# Patient Record
Sex: MALE | Race: WHITE | NOT HISPANIC OR LATINO | Employment: UNEMPLOYED | URBAN - METROPOLITAN AREA
[De-identification: names, ages, dates, MRNs, and addresses within clinical notes are randomized per-mention and may not be internally consistent; named-entity substitution may affect disease eponyms.]

---

## 2023-03-16 LAB
BASOPHILS (10*3/UL) IN BLOOD BY AUTOMATED COUNT: 0.05 X10E9/L (ref 0–0.1)
BASOPHILS/100 LEUKOCYTES IN BLOOD BY AUTOMATED COUNT: 0.5 % (ref 0–2)
EOSINOPHILS (10*3/UL) IN BLOOD BY AUTOMATED COUNT: 0.22 X10E9/L (ref 0–0.7)
EOSINOPHILS/100 LEUKOCYTES IN BLOOD BY AUTOMATED COUNT: 2.1 % (ref 0–6)
ERYTHROCYTE DISTRIBUTION WIDTH (RATIO) BY AUTOMATED COUNT: 12.6 % (ref 11.5–14.5)
ERYTHROCYTE MEAN CORPUSCULAR HEMOGLOBIN CONCENTRATION (G/DL) BY AUTOMATED: 31 G/DL (ref 32–36)
ERYTHROCYTE MEAN CORPUSCULAR VOLUME (FL) BY AUTOMATED COUNT: 93 FL (ref 80–100)
ERYTHROCYTES (10*6/UL) IN BLOOD BY AUTOMATED COUNT: 4.22 X10E12/L (ref 4.5–5.9)
HEMATOCRIT (%) IN BLOOD BY AUTOMATED COUNT: 39.4 % (ref 41–52)
HEMOGLOBIN (G/DL) IN BLOOD: 12.2 G/DL (ref 13.5–17.5)
IMMATURE GRANULOCYTES/100 LEUKOCYTES IN BLOOD BY AUTOMATED COUNT: 0.3 % (ref 0–0.9)
LEUKOCYTES (10*3/UL) IN BLOOD BY AUTOMATED COUNT: 10.4 X10E9/L (ref 4.4–11.3)
LYMPHOCYTES (10*3/UL) IN BLOOD BY AUTOMATED COUNT: 3.04 X10E9/L (ref 1.2–4.8)
LYMPHOCYTES/100 LEUKOCYTES IN BLOOD BY AUTOMATED COUNT: 29.2 % (ref 13–44)
MONOCYTES (10*3/UL) IN BLOOD BY AUTOMATED COUNT: 0.53 X10E9/L (ref 0.1–1)
MONOCYTES/100 LEUKOCYTES IN BLOOD BY AUTOMATED COUNT: 5.1 % (ref 2–10)
NEUTROPHILS (10*3/UL) IN BLOOD BY AUTOMATED COUNT: 6.55 X10E9/L (ref 1.2–7.7)
NEUTROPHILS/100 LEUKOCYTES IN BLOOD BY AUTOMATED COUNT: 62.8 % (ref 40–80)
NRBC (PER 100 WBCS) BY AUTOMATED COUNT: 0 /100 WBC (ref 0–0)
PLATELETS (10*3/UL) IN BLOOD AUTOMATED COUNT: 232 X10E9/L (ref 150–450)

## 2023-05-01 LAB
BASOPHILS (10*3/UL) IN BLOOD BY AUTOMATED COUNT: 0.04 X10E9/L (ref 0–0.1)
BASOPHILS/100 LEUKOCYTES IN BLOOD BY AUTOMATED COUNT: 0.4 % (ref 0–2)
EOSINOPHILS (10*3/UL) IN BLOOD BY AUTOMATED COUNT: 0.26 X10E9/L (ref 0–0.7)
EOSINOPHILS/100 LEUKOCYTES IN BLOOD BY AUTOMATED COUNT: 2.6 % (ref 0–6)
ERYTHROCYTE DISTRIBUTION WIDTH (RATIO) BY AUTOMATED COUNT: 12.6 % (ref 11.5–14.5)
ERYTHROCYTE MEAN CORPUSCULAR HEMOGLOBIN CONCENTRATION (G/DL) BY AUTOMATED: 31.7 G/DL (ref 32–36)
ERYTHROCYTE MEAN CORPUSCULAR VOLUME (FL) BY AUTOMATED COUNT: 92 FL (ref 80–100)
ERYTHROCYTES (10*6/UL) IN BLOOD BY AUTOMATED COUNT: 4.61 X10E12/L (ref 4.5–5.9)
HEMATOCRIT (%) IN BLOOD BY AUTOMATED COUNT: 42.3 % (ref 41–52)
HEMOGLOBIN (G/DL) IN BLOOD: 13.4 G/DL (ref 13.5–17.5)
IMMATURE GRANULOCYTES/100 LEUKOCYTES IN BLOOD BY AUTOMATED COUNT: 0.3 % (ref 0–0.9)
LEUKOCYTES (10*3/UL) IN BLOOD BY AUTOMATED COUNT: 10 X10E9/L (ref 4.4–11.3)
LYMPHOCYTES (10*3/UL) IN BLOOD BY AUTOMATED COUNT: 2.77 X10E9/L (ref 1.2–4.8)
LYMPHOCYTES/100 LEUKOCYTES IN BLOOD BY AUTOMATED COUNT: 27.8 % (ref 13–44)
MONOCYTES (10*3/UL) IN BLOOD BY AUTOMATED COUNT: 0.57 X10E9/L (ref 0.1–1)
MONOCYTES/100 LEUKOCYTES IN BLOOD BY AUTOMATED COUNT: 5.7 % (ref 2–10)
NEUTROPHILS (10*3/UL) IN BLOOD BY AUTOMATED COUNT: 6.28 X10E9/L (ref 1.2–7.7)
NEUTROPHILS/100 LEUKOCYTES IN BLOOD BY AUTOMATED COUNT: 63.2 % (ref 40–80)
NRBC (PER 100 WBCS) BY AUTOMATED COUNT: 0 /100 WBC (ref 0–0)
PLATELETS (10*3/UL) IN BLOOD AUTOMATED COUNT: 246 X10E9/L (ref 150–450)

## 2023-05-26 LAB
BASOPHILS (10*3/UL) IN BLOOD BY AUTOMATED COUNT: 0.06 X10E9/L (ref 0–0.1)
BASOPHILS/100 LEUKOCYTES IN BLOOD BY AUTOMATED COUNT: 0.6 % (ref 0–2)
EOSINOPHILS (10*3/UL) IN BLOOD BY AUTOMATED COUNT: 0.21 X10E9/L (ref 0–0.7)
EOSINOPHILS/100 LEUKOCYTES IN BLOOD BY AUTOMATED COUNT: 2.1 % (ref 0–6)
ERYTHROCYTE DISTRIBUTION WIDTH (RATIO) BY AUTOMATED COUNT: 12.9 % (ref 11.5–14.5)
ERYTHROCYTE MEAN CORPUSCULAR HEMOGLOBIN CONCENTRATION (G/DL) BY AUTOMATED: 31.5 G/DL (ref 32–36)
ERYTHROCYTE MEAN CORPUSCULAR VOLUME (FL) BY AUTOMATED COUNT: 91 FL (ref 80–100)
ERYTHROCYTES (10*6/UL) IN BLOOD BY AUTOMATED COUNT: 4.28 X10E12/L (ref 4.5–5.9)
HEMATOCRIT (%) IN BLOOD BY AUTOMATED COUNT: 39 % (ref 41–52)
HEMOGLOBIN (G/DL) IN BLOOD: 12.3 G/DL (ref 13.5–17.5)
IMMATURE GRANULOCYTES/100 LEUKOCYTES IN BLOOD BY AUTOMATED COUNT: 0.2 % (ref 0–0.9)
LEUKOCYTES (10*3/UL) IN BLOOD BY AUTOMATED COUNT: 10.2 X10E9/L (ref 4.4–11.3)
LYMPHOCYTES (10*3/UL) IN BLOOD BY AUTOMATED COUNT: 2.46 X10E9/L (ref 1.2–4.8)
LYMPHOCYTES/100 LEUKOCYTES IN BLOOD BY AUTOMATED COUNT: 24 % (ref 13–44)
MONOCYTES (10*3/UL) IN BLOOD BY AUTOMATED COUNT: 0.49 X10E9/L (ref 0.1–1)
MONOCYTES/100 LEUKOCYTES IN BLOOD BY AUTOMATED COUNT: 4.8 % (ref 2–10)
NEUTROPHILS (10*3/UL) IN BLOOD BY AUTOMATED COUNT: 6.99 X10E9/L (ref 1.2–7.7)
NEUTROPHILS/100 LEUKOCYTES IN BLOOD BY AUTOMATED COUNT: 68.3 % (ref 40–80)
NRBC (PER 100 WBCS) BY AUTOMATED COUNT: 0 /100 WBC (ref 0–0)
PLATELETS (10*3/UL) IN BLOOD AUTOMATED COUNT: 296 X10E9/L (ref 150–450)

## 2023-06-26 LAB
BASOPHILS (10*3/UL) IN BLOOD BY AUTOMATED COUNT: 0.04 X10E9/L (ref 0–0.1)
BASOPHILS/100 LEUKOCYTES IN BLOOD BY AUTOMATED COUNT: 0.3 % (ref 0–2)
EOSINOPHILS (10*3/UL) IN BLOOD BY AUTOMATED COUNT: 0.21 X10E9/L (ref 0–0.7)
EOSINOPHILS/100 LEUKOCYTES IN BLOOD BY AUTOMATED COUNT: 1.8 % (ref 0–6)
ERYTHROCYTE DISTRIBUTION WIDTH (RATIO) BY AUTOMATED COUNT: 13.1 % (ref 11.5–14.5)
ERYTHROCYTE MEAN CORPUSCULAR HEMOGLOBIN CONCENTRATION (G/DL) BY AUTOMATED: 31 G/DL (ref 32–36)
ERYTHROCYTE MEAN CORPUSCULAR VOLUME (FL) BY AUTOMATED COUNT: 92 FL (ref 80–100)
ERYTHROCYTES (10*6/UL) IN BLOOD BY AUTOMATED COUNT: 4.57 X10E12/L (ref 4.5–5.9)
HEMATOCRIT (%) IN BLOOD BY AUTOMATED COUNT: 42.2 % (ref 41–52)
HEMOGLOBIN (G/DL) IN BLOOD: 13.1 G/DL (ref 13.5–17.5)
IMMATURE GRANULOCYTES/100 LEUKOCYTES IN BLOOD BY AUTOMATED COUNT: 0.3 % (ref 0–0.9)
LEUKOCYTES (10*3/UL) IN BLOOD BY AUTOMATED COUNT: 11.7 X10E9/L (ref 4.4–11.3)
LYMPHOCYTES (10*3/UL) IN BLOOD BY AUTOMATED COUNT: 2.72 X10E9/L (ref 1.2–4.8)
LYMPHOCYTES/100 LEUKOCYTES IN BLOOD BY AUTOMATED COUNT: 23.2 % (ref 13–44)
MONOCYTES (10*3/UL) IN BLOOD BY AUTOMATED COUNT: 0.62 X10E9/L (ref 0.1–1)
MONOCYTES/100 LEUKOCYTES IN BLOOD BY AUTOMATED COUNT: 5.3 % (ref 2–10)
NEUTROPHILS (10*3/UL) IN BLOOD BY AUTOMATED COUNT: 8.12 X10E9/L (ref 1.2–7.7)
NEUTROPHILS/100 LEUKOCYTES IN BLOOD BY AUTOMATED COUNT: 69.1 % (ref 40–80)
NRBC (PER 100 WBCS) BY AUTOMATED COUNT: 0 /100 WBC (ref 0–0)
PLATELETS (10*3/UL) IN BLOOD AUTOMATED COUNT: 241 X10E9/L (ref 150–450)

## 2023-07-27 LAB
BASOPHILS (10*3/UL) IN BLOOD BY AUTOMATED COUNT: 0.05 X10E9/L (ref 0–0.1)
BASOPHILS/100 LEUKOCYTES IN BLOOD BY AUTOMATED COUNT: 0.5 % (ref 0–2)
EOSINOPHILS (10*3/UL) IN BLOOD BY AUTOMATED COUNT: 0.18 X10E9/L (ref 0–0.7)
EOSINOPHILS/100 LEUKOCYTES IN BLOOD BY AUTOMATED COUNT: 1.9 % (ref 0–6)
ERYTHROCYTE DISTRIBUTION WIDTH (RATIO) BY AUTOMATED COUNT: 13.4 % (ref 11.5–14.5)
ERYTHROCYTE MEAN CORPUSCULAR HEMOGLOBIN CONCENTRATION (G/DL) BY AUTOMATED: 30.4 G/DL (ref 32–36)
ERYTHROCYTE MEAN CORPUSCULAR VOLUME (FL) BY AUTOMATED COUNT: 95 FL (ref 80–100)
ERYTHROCYTES (10*6/UL) IN BLOOD BY AUTOMATED COUNT: 4.61 X10E12/L (ref 4.5–5.9)
HEMATOCRIT (%) IN BLOOD BY AUTOMATED COUNT: 43.8 % (ref 41–52)
HEMOGLOBIN (G/DL) IN BLOOD: 13.3 G/DL (ref 13.5–17.5)
IMMATURE GRANULOCYTES/100 LEUKOCYTES IN BLOOD BY AUTOMATED COUNT: 0.3 % (ref 0–0.9)
LEUKOCYTES (10*3/UL) IN BLOOD BY AUTOMATED COUNT: 9.6 X10E9/L (ref 4.4–11.3)
LYMPHOCYTES (10*3/UL) IN BLOOD BY AUTOMATED COUNT: 2.01 X10E9/L (ref 1.2–4.8)
LYMPHOCYTES/100 LEUKOCYTES IN BLOOD BY AUTOMATED COUNT: 21 % (ref 13–44)
MONOCYTES (10*3/UL) IN BLOOD BY AUTOMATED COUNT: 0.46 X10E9/L (ref 0.1–1)
MONOCYTES/100 LEUKOCYTES IN BLOOD BY AUTOMATED COUNT: 4.8 % (ref 2–10)
NEUTROPHILS (10*3/UL) IN BLOOD BY AUTOMATED COUNT: 6.83 X10E9/L (ref 1.2–7.7)
NEUTROPHILS/100 LEUKOCYTES IN BLOOD BY AUTOMATED COUNT: 71.5 % (ref 40–80)
NRBC (PER 100 WBCS) BY AUTOMATED COUNT: 0 /100 WBC (ref 0–0)
PLATELETS (10*3/UL) IN BLOOD AUTOMATED COUNT: 229 X10E9/L (ref 150–450)

## 2023-08-29 PROBLEM — R41.842 VISUOSPATIAL DEFICIT: Status: ACTIVE | Noted: 2023-08-29

## 2023-08-29 PROBLEM — F09 COGNITIVE DYSFUNCTION, ACQUIRED: Status: ACTIVE | Noted: 2023-08-29

## 2023-08-29 PROBLEM — R41.843 PSYCHOMOTOR DEFICIT: Status: ACTIVE | Noted: 2023-08-29

## 2023-08-29 PROBLEM — J34.2 DEVIATED NASAL SEPTUM: Status: ACTIVE | Noted: 2021-05-06

## 2023-08-29 PROBLEM — E78.5 BORDERLINE HYPERLIPIDEMIA: Status: ACTIVE | Noted: 2023-08-29

## 2023-08-29 PROBLEM — R41.840 ATTENTION OR CONCENTRATION DEFICIT: Status: ACTIVE | Noted: 2023-08-29

## 2023-08-29 PROBLEM — R41.89 COGNITIVE IMPAIRMENT: Status: ACTIVE | Noted: 2023-08-29

## 2023-08-29 PROBLEM — F31.31 BIPOLAR AFFECTIVE DISORDER, CURRENTLY DEPRESSED, MILD (MULTI): Status: ACTIVE | Noted: 2023-08-29

## 2023-08-29 PROBLEM — R00.0 TACHYCARDIA WITH HEART RATE 100-120 BEATS PER MINUTE: Status: ACTIVE | Noted: 2023-08-29

## 2023-08-29 PROBLEM — G40.802 FRONTAL LOBE EPILEPSY (MULTI): Status: ACTIVE | Noted: 2023-08-29

## 2023-08-29 PROBLEM — M76.61 RIGHT ACHILLES TENDINITIS: Status: ACTIVE | Noted: 2023-08-29

## 2023-08-29 PROBLEM — I10 BENIGN ESSENTIAL HYPERTENSION: Status: ACTIVE | Noted: 2023-08-29

## 2023-08-29 PROBLEM — E55.9 VITAMIN D DEFICIENCY: Status: ACTIVE | Noted: 2023-08-29

## 2023-08-29 PROBLEM — S06.9X5A: Status: ACTIVE | Noted: 2023-08-29

## 2023-08-29 PROBLEM — F31.9 BIPOLAR AFFECTIVE DISORDER (MULTI): Status: ACTIVE | Noted: 2023-08-29

## 2023-08-29 PROBLEM — F90.0 ADHD, PREDOMINANTLY INATTENTIVE TYPE: Status: ACTIVE | Noted: 2023-08-29

## 2023-08-29 PROBLEM — R41.3 MEMORY LOSS: Status: ACTIVE | Noted: 2023-08-29

## 2023-08-29 PROBLEM — R56.9 SEIZURE (MULTI): Status: ACTIVE | Noted: 2023-08-29

## 2023-08-29 PROBLEM — F31.11: Status: ACTIVE | Noted: 2023-08-29

## 2023-08-29 PROBLEM — M25.871 ANKLE IMPINGEMENT SYNDROME, RIGHT: Status: ACTIVE | Noted: 2023-08-29

## 2023-08-29 PROBLEM — F41.9 ANXIETY: Status: ACTIVE | Noted: 2023-08-29

## 2023-08-29 PROBLEM — G40.309 GENERALIZED EPILEPSY (MULTI): Status: ACTIVE | Noted: 2023-08-29

## 2023-08-29 PROBLEM — R41.844 FRONTAL LOBE AND EXECUTIVE FUNCTION DEFICIT: Status: ACTIVE | Noted: 2023-08-29

## 2023-08-29 RX ORDER — HYDROCHLOROTHIAZIDE 25 MG/1
1 TABLET ORAL DAILY
COMMUNITY

## 2023-08-29 RX ORDER — BUSPIRONE HYDROCHLORIDE 5 MG/1
5 TABLET ORAL
COMMUNITY
End: 2023-10-05 | Stop reason: SDUPTHER

## 2023-08-29 RX ORDER — LITHIUM CARBONATE 300 MG/1
2 TABLET, FILM COATED, EXTENDED RELEASE ORAL NIGHTLY
COMMUNITY
Start: 2021-05-13 | End: 2023-10-10 | Stop reason: SDUPTHER

## 2023-08-29 RX ORDER — LITHIUM CARBONATE 450 MG/1
1 TABLET ORAL EVERY MORNING
COMMUNITY
Start: 2019-02-01 | End: 2023-10-10 | Stop reason: SDUPTHER

## 2023-08-29 RX ORDER — FOLIC ACID 1 MG/1
1 TABLET ORAL DAILY
COMMUNITY
Start: 2018-05-01 | End: 2024-01-16 | Stop reason: SDUPTHER

## 2023-08-29 RX ORDER — CLOZAPINE 25 MG/1
1 TABLET ORAL 2 TIMES DAILY
COMMUNITY
Start: 2018-09-19 | End: 2023-10-10 | Stop reason: SDUPTHER

## 2023-08-29 RX ORDER — FLUTICASONE PROPIONATE 50 MCG
2 SPRAY, SUSPENSION (ML) NASAL DAILY
COMMUNITY

## 2023-08-29 RX ORDER — GABAPENTIN 400 MG/1
1 CAPSULE ORAL 3 TIMES DAILY
COMMUNITY
Start: 2018-10-30 | End: 2023-10-10 | Stop reason: SDUPTHER

## 2023-08-29 RX ORDER — AZELASTINE 1 MG/ML
2 SPRAY, METERED NASAL 2 TIMES DAILY
COMMUNITY

## 2023-08-29 RX ORDER — MULTIVIT-MIN/IRON FUM/FOLIC AC 7.5 MG-4
TABLET ORAL
COMMUNITY
Start: 2019-02-21

## 2023-08-29 RX ORDER — LACOSAMIDE 150 MG/1
1 TABLET, FILM COATED ORAL 2 TIMES DAILY
COMMUNITY
Start: 2020-07-09 | End: 2023-11-01 | Stop reason: SDUPTHER

## 2023-08-29 RX ORDER — CLOZAPINE 200 MG/1
200 TABLET ORAL
COMMUNITY
Start: 2018-09-19 | End: 2023-10-10 | Stop reason: SDUPTHER

## 2023-08-29 RX ORDER — HYDROCODONE BITARTRATE AND ACETAMINOPHEN 5; 325 MG/1; MG/1
TABLET ORAL
COMMUNITY

## 2023-08-29 RX ORDER — LACOSAMIDE 50 MG/1
TABLET ORAL
COMMUNITY
End: 2023-11-01 | Stop reason: SDUPTHER

## 2023-09-08 LAB
BASOPHILS (10*3/UL) IN BLOOD BY AUTOMATED COUNT: 0.04 X10E9/L (ref 0–0.1)
BASOPHILS/100 LEUKOCYTES IN BLOOD BY AUTOMATED COUNT: 0.4 % (ref 0–2)
EOSINOPHILS (10*3/UL) IN BLOOD BY AUTOMATED COUNT: 0.26 X10E9/L (ref 0–0.7)
EOSINOPHILS/100 LEUKOCYTES IN BLOOD BY AUTOMATED COUNT: 2.4 % (ref 0–6)
ERYTHROCYTE DISTRIBUTION WIDTH (RATIO) BY AUTOMATED COUNT: 13.2 % (ref 11.5–14.5)
ERYTHROCYTE MEAN CORPUSCULAR HEMOGLOBIN CONCENTRATION (G/DL) BY AUTOMATED: 31 G/DL (ref 32–36)
ERYTHROCYTE MEAN CORPUSCULAR VOLUME (FL) BY AUTOMATED COUNT: 95 FL (ref 80–100)
ERYTHROCYTES (10*6/UL) IN BLOOD BY AUTOMATED COUNT: 4.46 X10E12/L (ref 4.5–5.9)
HEMATOCRIT (%) IN BLOOD BY AUTOMATED COUNT: 42.2 % (ref 41–52)
HEMOGLOBIN (G/DL) IN BLOOD: 13.1 G/DL (ref 13.5–17.5)
IMMATURE GRANULOCYTES/100 LEUKOCYTES IN BLOOD BY AUTOMATED COUNT: 1.2 % (ref 0–0.9)
LEUKOCYTES (10*3/UL) IN BLOOD BY AUTOMATED COUNT: 10.8 X10E9/L (ref 4.4–11.3)
LYMPHOCYTES (10*3/UL) IN BLOOD BY AUTOMATED COUNT: 2.88 X10E9/L (ref 1.2–4.8)
LYMPHOCYTES/100 LEUKOCYTES IN BLOOD BY AUTOMATED COUNT: 26.6 % (ref 13–44)
MONOCYTES (10*3/UL) IN BLOOD BY AUTOMATED COUNT: 0.51 X10E9/L (ref 0.1–1)
MONOCYTES/100 LEUKOCYTES IN BLOOD BY AUTOMATED COUNT: 4.7 % (ref 2–10)
NEUTROPHILS (10*3/UL) IN BLOOD BY AUTOMATED COUNT: 6.99 X10E9/L (ref 1.2–7.7)
NEUTROPHILS/100 LEUKOCYTES IN BLOOD BY AUTOMATED COUNT: 64.7 % (ref 40–80)
NRBC (PER 100 WBCS) BY AUTOMATED COUNT: 0 /100 WBC (ref 0–0)
PLATELETS (10*3/UL) IN BLOOD AUTOMATED COUNT: 226 X10E9/L (ref 150–450)

## 2023-10-04 ENCOUNTER — PROCEDURE VISIT (OUTPATIENT)
Dept: PSYCHOLOGY | Facility: HOSPITAL | Age: 33
End: 2023-10-04
Payer: MEDICARE

## 2023-10-04 DIAGNOSIS — R41.89 COGNITIVE IMPAIRMENT: Primary | ICD-10-CM

## 2023-10-04 DIAGNOSIS — R41.844 FRONTAL LOBE AND EXECUTIVE FUNCTION DEFICIT: ICD-10-CM

## 2023-10-04 DIAGNOSIS — S06.9X5S: ICD-10-CM

## 2023-10-04 PROCEDURE — 96158 HLTH BHV IVNTJ INDIV 1ST 30: CPT | Performed by: PSYCHOLOGIST

## 2023-10-04 PROCEDURE — 96159 HLTH BHV IVNTJ INDIV EA ADDL: CPT | Performed by: PSYCHOLOGIST

## 2023-10-04 NOTE — PSYCHOTHERAPY
Paige continues to do well living independently, with supports. Today we reviewed his activities and 'rules':  Dating: he is back on two dating apps but not seeing anyone, though he would like to.  4 pm rule: Paige is not sticking to the 4 pm rule very well, though this morning he started to look on social media and instead went for a walk; we talked about the reasons for this rule as helping him maintain a varied schedule and not spend too much time on line.  Exercise: Paige has run several times and swum once, walking most other days. We agreed a SMART goal of exercise 5/7 days/week, with one day each of swimming or running.  Recipes: He is not making new recipes once a week, saying it is because he shops after he cooks. We looked at this and changed the plan to shop before he cooks and to plan meals before he shops. He will shop on Friday and cook on the weekend.  Reading: He continues to do his reading assignments and news feed but no other reading. He acknowledged that he does not really want to read anything else, so we changed the goal to reflect this.  Cleaning: okay  Jobs: meeting the goal  Ukelele: has not practiced because missed lessons; plan is to resume practice 3 / week for 15 minutes each time. He is also to email me.  Work: he is working with [employer] on a sensible return to work plan and has completed the first section of a [employer] course  Paige hopes to return to driving, and we talked through the process of being cleared medically, taking an OT eval, and then taking an OT road eval.  Time: 2:45 pm - 3:45 pm  Next session: October 18, 2023

## 2023-10-04 NOTE — PSYCHOTHERAPY
Paige continues to do well with living independently. We reviewed his list of independent living rules:  4 pm rule (no social media before 4 pm): Fair. Paige started to look online this morning, but then decided to go for a walk instead, which was a good choice.  Exercise: Paige has swum once and run a bit. We tightened the plan to exercise 5/7 days/week, with one day each of swimming and running.  Recipe: Paige has not consistently prepared one new recipe a week. We identified problems with his planning in that he shops after the days when he would cook. We changed this to planning and shopping on Friday and then cooking on the weekend.  Reading: Paige is completing his reading assignments and news feed reading, but not his extra reading. He said this is not really a goal for him, so we decided to omit it from this list.  Cleaning: Satisfactory.  Jobs:  Good attendance.  Uke practice: he has not practiced because he has not had a lesson. Agreed he will practice 3 times a week for 15 minutes each.  Work in the future: He is meeting with Sharon Trevino in OT to make a sensible return to work plan.   Driving: Paige wants to drive and understands he first needs medical clearance, and then must pass OT evals and on the road tests.

## 2023-10-05 ENCOUNTER — OFFICE VISIT (OUTPATIENT)
Dept: BEHAVIORAL HEALTH | Facility: CLINIC | Age: 33
End: 2023-10-05
Payer: MEDICARE

## 2023-10-05 ENCOUNTER — TREATMENT (OUTPATIENT)
Dept: OCCUPATIONAL THERAPY | Facility: HOSPITAL | Age: 33
End: 2023-10-05
Payer: MEDICARE

## 2023-10-05 VITALS
DIASTOLIC BLOOD PRESSURE: 81 MMHG | SYSTOLIC BLOOD PRESSURE: 120 MMHG | HEART RATE: 79 BPM | HEIGHT: 69 IN | TEMPERATURE: 98.4 F | BODY MASS INDEX: 27.7 KG/M2 | WEIGHT: 187 LBS

## 2023-10-05 DIAGNOSIS — R41.89 COGNITIVE IMPAIRMENT: Primary | ICD-10-CM

## 2023-10-05 DIAGNOSIS — R41.844 FRONTAL LOBE AND EXECUTIVE FUNCTION DEFICIT: ICD-10-CM

## 2023-10-05 DIAGNOSIS — R41.840 ATTENTION OR CONCENTRATION DEFICIT: ICD-10-CM

## 2023-10-05 DIAGNOSIS — F31.70 BIPOLAR DISORDER IN PARTIAL REMISSION, MOST RECENT EPISODE UNSPECIFIED TYPE (MULTI): ICD-10-CM

## 2023-10-05 DIAGNOSIS — F41.9 ANXIETY: ICD-10-CM

## 2023-10-05 PROCEDURE — 3079F DIAST BP 80-89 MM HG: CPT | Performed by: PSYCHIATRY & NEUROLOGY

## 2023-10-05 PROCEDURE — 3074F SYST BP LT 130 MM HG: CPT | Performed by: PSYCHIATRY & NEUROLOGY

## 2023-10-05 PROCEDURE — 99215 OFFICE O/P EST HI 40 MIN: CPT | Performed by: PSYCHIATRY & NEUROLOGY

## 2023-10-05 PROCEDURE — 97537 COMMUNITY/WORK REINTEGRATION: CPT | Mod: GO | Performed by: OCCUPATIONAL THERAPIST

## 2023-10-05 RX ORDER — BUSPIRONE HYDROCHLORIDE 5 MG/1
TABLET ORAL
Qty: 90 TABLET | Refills: 2 | Status: SHIPPED | OUTPATIENT
Start: 2023-10-05 | End: 2023-10-10 | Stop reason: SDUPTHER

## 2023-10-05 ASSESSMENT — PAIN SCALES - GENERAL
PAINLEVEL_OUTOF10: 0 - NO PAIN
PAINLEVEL: 0-NO PAIN

## 2023-10-05 ASSESSMENT — PAIN - FUNCTIONAL ASSESSMENT: PAIN_FUNCTIONAL_ASSESSMENT: 0-10

## 2023-10-05 NOTE — PROGRESS NOTES
"          Occupational Therapy    Occupational Therapy Treatment    Name: Paige Ybarra  MRN: [id number]  : 1990  Date: 10/05/23  Time Calculation  Start Time: 1110    Assessment: Pt demonstrating improvement in ability to use PQRST technique for reading. He however is demonstrating difficutly with recall of home program and will need to use compensatory strategies to manage home programs from various providers. Speed of processing for management of change is slow and will need to increase in order to work in position patient is considering.      Plan:    Continue OT treatment.     Subjective : Honestly, I forgot that I had homework from you, what was I supposed to do?\"  General:  OT Last Visit  OT Received On: 10/05/23  General  Referred By: Dr. Herminia Angulo  Preferred Learning Style: verbal, visual, kinesthetic  Pain Assessment:  Pain Assessment  Pain Assessment: 0-10  Pain Score: 0 - No pain    Objective          Cognitive Skill Development:  Cognitive Skill Development  Cognitive Skill Development Activity 1: Patient forgetting homework last week and this week. Patient asked to identify a plan to avoid forgetting homework in the future. He identified plan to take notes, place a header of home practice to make homework more obvious when reviewing notes and to put reminder in his phone. He required cues to enter homework into phone at end of session.  Cognitive Skill Development Activity 2: Continued with reading retraining to prepare for work.  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 .Patient initially required mod cues for PQRST technique progressing to intermittent min cues. He will need to improve self initiation of this technique to be able to independently learn in work position.  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 He had some difficulty determining change amounts mentally. Provided instruction on counting back change. Using this method, patient was able to count back change with 83% accuracy with extended time required for processing.  Community/Work Reintegration Training Activity 2: Discussed patient's goal of working and further explored reasons for goal  to prioritize tx.  Patient identified wanting to work due to boredom, desire to help others, satisfaction with being able to earn money, ability to learn. Patient places high importance on work goal.                OP EDUCATION: Patient instructed in use of PQRST techniques and on technique for counting back change.  He demonstrated good understanding but will require additional instruction due to cognitive deficits.        Goals:  Encounter Problems       Encounter Problems (Active)       goals:       Patient will complete work exploration identifying personal goals, job options, and independently comparing work options to his goals.       Start:  10/05/23    Expected End:  01/04/24            Patient will complete work training independently with use of compensatory tools/techniques       Start:  10/05/23    Expected End:  01/04/24            Patient will complete alternative activities to work such as leisure or schooling to meet personal goals independently using compensatory tools/techniques.       Start:  10/05/23    Expected End:  01/04/24            Patient will complete work exploration, interview, application,procurement and job tasks independently using compensatory techniques       Start:  10/05/23    Expected End:   01/04/24            Patient will independently use memory, planning, problem solving, and decision making cognitive aids.,       Start:  10/05/23    Expected End:  01/04/24

## 2023-10-05 NOTE — PATIENT INSTRUCTIONS
Plan:   - Continue buspirone 5mg in the morning and increase to 10mg (two 5mg tablets) in the evening   - Continue other current medications:  Clozapine 225mg (200mg +25mg) in AM, 425mg (two 200mg + 25mg) at night  Lithium 450mg in morning, 600mg in evening  Gabapentin 400mg tid.   Folic acid 1mg daily.   - Please get additional blood tests at your next monthly blood draw. It has to be done before you take your morning medication.   - Continue monthly blood draws.   - Continue to monitor for any change in mood, tremors or any new symptoms.   - Follow up with neurologist on a regular basis for management of seizures.   - Continue psychotherapy and cognitive rehabilitation therapy.   - Regular physical exercise.   - Recommend heart-healthy, low-fat, low-cholesterol diet.   - Follow up in about 2 months.   - Call sooner ([phone]) in case of any questions or concerns.
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
## 2023-10-07 ENCOUNTER — LAB (OUTPATIENT)
Dept: LAB | Facility: LAB | Age: 33
End: 2023-10-07
Payer: MEDICARE

## 2023-10-07 DIAGNOSIS — F31.70 BIPOLAR DISORDER IN PARTIAL REMISSION, MOST RECENT EPISODE UNSPECIFIED TYPE (MULTI): ICD-10-CM

## 2023-10-07 LAB
LITHIUM SERPL-SCNC: 0.72 MMOL/L (ref 0.6–1.2)
TSH SERPL-ACNC: 1.43 MIU/L (ref 0.44–3.98)

## 2023-10-07 PROCEDURE — 80159 DRUG ASSAY CLOZAPINE: CPT

## 2023-10-07 PROCEDURE — 36415 COLL VENOUS BLD VENIPUNCTURE: CPT

## 2023-10-07 PROCEDURE — 84443 ASSAY THYROID STIM HORMONE: CPT

## 2023-10-07 PROCEDURE — 80178 ASSAY OF LITHIUM: CPT

## 2023-10-10 ENCOUNTER — LAB (OUTPATIENT)
Dept: LAB | Facility: LAB | Age: 33
End: 2023-10-10
Payer: MEDICARE

## 2023-10-10 DIAGNOSIS — F31.70 BIPOLAR AFFECTIVE DISORDER IN REMISSION (MULTI): ICD-10-CM

## 2023-10-10 DIAGNOSIS — F41.9 ANXIETY: ICD-10-CM

## 2023-10-10 LAB
BASOPHILS # BLD AUTO: 0.02 X10*3/UL (ref 0–0.1)
BASOPHILS NFR BLD AUTO: 0.2 %
EOSINOPHIL # BLD AUTO: 0.15 X10*3/UL (ref 0–0.7)
EOSINOPHIL NFR BLD AUTO: 1.7 %
ERYTHROCYTE [DISTWIDTH] IN BLOOD BY AUTOMATED COUNT: 12.8 % (ref 11.5–14.5)
HCT VFR BLD AUTO: 40.4 % (ref 41–52)
HGB BLD-MCNC: 12.8 G/DL (ref 13.5–17.5)
IMM GRANULOCYTES # BLD AUTO: 0.02 X10*3/UL (ref 0–0.7)
IMM GRANULOCYTES NFR BLD AUTO: 0.2 % (ref 0–0.9)
LYMPHOCYTES # BLD AUTO: 1.31 X10*3/UL (ref 1.2–4.8)
LYMPHOCYTES NFR BLD AUTO: 14.8 %
MCH RBC QN AUTO: 30 PG (ref 26–34)
MCHC RBC AUTO-ENTMCNC: 31.7 G/DL (ref 32–36)
MCV RBC AUTO: 95 FL (ref 80–100)
MONOCYTES # BLD AUTO: 0.56 X10*3/UL (ref 0.1–1)
MONOCYTES NFR BLD AUTO: 6.3 %
NEUTROPHILS # BLD AUTO: 6.78 X10*3/UL (ref 1.2–7.7)
NEUTROPHILS NFR BLD AUTO: 76.8 %
NRBC BLD-RTO: 0 /100 WBCS (ref 0–0)
PLATELET # BLD AUTO: 204 X10*3/UL (ref 150–450)
PMV BLD AUTO: 11.7 FL (ref 7.5–11.5)
RBC # BLD AUTO: 4.26 X10*6/UL (ref 4.5–5.9)
WBC # BLD AUTO: 8.8 X10*3/UL (ref 4.4–11.3)

## 2023-10-10 PROCEDURE — 85025 COMPLETE CBC W/AUTO DIFF WBC: CPT

## 2023-10-10 PROCEDURE — 36415 COLL VENOUS BLD VENIPUNCTURE: CPT

## 2023-10-10 RX ORDER — CLOZAPINE 25 MG/1
25 TABLET ORAL 2 TIMES DAILY
Qty: 60 TABLET | Refills: 2 | Status: SHIPPED | OUTPATIENT
Start: 2023-10-10 | End: 2024-05-20 | Stop reason: SDUPTHER

## 2023-10-10 RX ORDER — LITHIUM CARBONATE 300 MG/1
600 TABLET, FILM COATED, EXTENDED RELEASE ORAL NIGHTLY
Qty: 60 TABLET | Refills: 2 | Status: SHIPPED | OUTPATIENT
Start: 2023-10-10 | End: 2024-05-23

## 2023-10-10 RX ORDER — GABAPENTIN 400 MG/1
400 CAPSULE ORAL 3 TIMES DAILY
Qty: 90 CAPSULE | Refills: 2 | Status: SHIPPED | OUTPATIENT
Start: 2023-10-10 | End: 2024-03-26 | Stop reason: SDUPTHER

## 2023-10-10 RX ORDER — LITHIUM CARBONATE 450 MG/1
450 TABLET ORAL EVERY MORNING
Qty: 30 TABLET | Refills: 2 | Status: SHIPPED | OUTPATIENT
Start: 2023-10-10 | End: 2024-06-06 | Stop reason: SDUPTHER

## 2023-10-10 RX ORDER — BUSPIRONE HYDROCHLORIDE 5 MG/1
TABLET ORAL
Qty: 90 TABLET | Refills: 2 | Status: SHIPPED | OUTPATIENT
Start: 2023-10-10 | End: 2024-01-16 | Stop reason: SDUPTHER

## 2023-10-10 RX ORDER — CLOZAPINE 200 MG/1
TABLET ORAL
Qty: 90 TABLET | Refills: 2 | Status: SHIPPED | OUTPATIENT
Start: 2023-10-10 | End: 2024-05-20 | Stop reason: SDUPTHER

## 2023-10-10 NOTE — PROGRESS NOTES
Paige continues to do well living independently, with supports. Today we reviewed his activities and 'rules':  Dating: he is back on two dating apps but not seeing anyone, though he would like to.  4 pm rule: Paige is not sticking to the 4 pm rule very well, though this morning he started to look on social media and instead went for a walk; we talked about the reasons for this rule as helping him maintain a varied schedule and not spend too much time on line.  Exercise: Paige has run several times and swum once, walking most other days. We agreed a SMART goal of exercise 5/7 days/week, with one day each of swimming or running.  Recipes: He is not making new recipes once a week, saying it is because he shops after he cooks. We looked at this and changed the plan to shop before he cooks and to plan meals before he shops. He will shop on Friday and cook on the weekend.  Reading: He continues to do his reading assignments and news feed but no other reading. He acknowledged that he does not really want to read anything else, so we changed the goal to reflect this.  Cleaning: okay  Jobs: meeting the goal  Ukelele: has not practiced because missed lessons; plan is to resume practice 3 / week for 15 minutes each time. He is also to email me.  Work: he is working with Sharon on a sensible return to work plan and has completed the first section of a [employer] course  Paige hopes to return to driving, and we talked through the process of being cleared medically, taking an OT eval, and then taking an OT road eval.  Time: 2:45 pm - 3:45 pm  Next session: October 18, 2023

## 2023-10-11 ENCOUNTER — OFFICE VISIT (OUTPATIENT)
Dept: PSYCHOLOGY | Facility: HOSPITAL | Age: 33
End: 2023-10-11
Payer: MEDICARE

## 2023-10-11 DIAGNOSIS — R41.840 ATTENTION OR CONCENTRATION DEFICIT: ICD-10-CM

## 2023-10-11 DIAGNOSIS — Z87.820 HISTORY OF TRAUMATIC BRAIN INJURY: ICD-10-CM

## 2023-10-11 DIAGNOSIS — R41.3 MEMORY LOSS: Primary | ICD-10-CM

## 2023-10-11 DIAGNOSIS — R41.89 COGNITIVE IMPAIRMENT: ICD-10-CM

## 2023-10-11 LAB
CLOZAPINE SERPL-MCNC: 356 NG/ML
CLOZAPINE+NOR SERPL-MCNC: 566 NG/ML
CNO SERPL-MCNC: <100 NG/ML
NORCLOZAPINE SERPL-MCNC: 210 NG/ML

## 2023-10-11 PROCEDURE — 96158 HLTH BHV IVNTJ INDIV 1ST 30: CPT

## 2023-10-11 PROCEDURE — 96158 HLTH BHV IVNTJ INDIV 1ST 30: CPT | Mod: AH

## 2023-10-11 PROCEDURE — 96159 HLTH BHV IVNTJ INDIV EA ADDL: CPT

## 2023-10-11 PROCEDURE — 96159 HLTH BHV IVNTJ INDIV EA ADDL: CPT | Mod: AH

## 2023-10-11 NOTE — PROGRESS NOTES
Outpatient Physical Therapy Lymphedema Treatment Note  [facility]     Patient Name: Richa Dolan  : 1958  MRN: [id number]  Today's Date: 2021        Visit Date: 2021    Visit Dx:    ICD-10-CM ICD-9-CM   1. Malignant neoplasm of overlapping sites of left breast in female, estrogen receptor positive (CMS/HCC)  C50.812 174.8    Z17.0 V86.0   2. Status post left breast lumpectomy  Z98.890 V45.89   3. At risk for lymphedema  Z91.89 V49.89   4. Decreased range of motion of left shoulder  M25.612 719.51   5. Breast edema  N64.89 611.89   6. History of left breast cancer  Z85.3 V10.3   7. Scar tissue  L90.5 709.2   8. Axillary pain, left  M79.622 729.5   9. Shoulder impingement syndrome, left  M75.42 726.2       Patient Active Problem List   Diagnosis   • Abnormal liver function tests   • Hypertension   • Insomnia   • Knee pain   • Leukopenia   • Acute right-sided low back pain without sciatica   • Knee osteoarthritis   • Osteoporosis   • Arthralgia of multiple joints   • Seasonal allergic rhinitis   • Thrombocytopenia (CMS/HCC)   • Bone cyst   • Chronic pain of right knee   • Acquired immunocompromised state (CMS/HCC)   • Bruises easily   • Loss of hair   • Anxiety   • Elevated liver enzymes   • GERD (gastroesophageal reflux disease)   • HIV positive long-term non-progressor (CMS/HCC)   • Hypercholesterolemia   • Lupus anticoagulant positive   • Neck pain, chronic   • Chemotherapy-induced neutropenia (CMS/HCC)   • Tear of medial meniscus of right knee, current   • Chondromalacia, knee   • ICH (intracerebral hemorrhage) (CMS/HCC)   • Stomach discomfort   • Abnormal bruising   • Alopecia   • Headache   • Intractable chronic migraine without aura and without status migrainosus   • Osteoarthritis of right knee   • Need for vaccination   • Mild episode of recurrent major depressive disorder (CMS/HCC)   • Ingrown nail of great toe of right foot   • Left wrist pain   • Breast cancer screening   •  Name: Paige Ybarra   MRN: [id number]   Age: 33 y.o.   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     Session Details:   The topics of the session included completing a check-in, reviewing home practice, and discussing ways to implement cognitive tools in his daily life. During the check-in, he indicated not having a lot of success using dating apps, so he plans on starting to ask out individuals in person. He was praised for being cognitively flexible. He also indicated having low confidence and needing the approval of his mother. We discussed the possibility of setting boundaries, but he had a hard time determining appropriate boundaries to set; he is planning on talking to his therapist about it later this week. The home practice was reviewed to identify at least one tool for each cognitive problem he identified (completed) and practice Ukulele at least 3x per week (not completed). He successfully generated a number of solutions for various memory, attention, and executive functioning problems. We reviewed the solutions he generated, and we made minor adjustments/modifications to the solutions. The plan is for him to create a living toolbox for cognitive compensatory strategies. He generated numerous creative ways to create a toolbox, such as using an excel spreadsheet, creating flip book, creating poster, writing song, among others. We discussed using the goal management framework to select a solution after weighing the pros/cons of them. Paige continues to have difficulty practicing the ukulele consistently. He identified that not having consistent lessons as a barrier for him practicing. We problem solved some solutions, such as asking his ukulele teacher ways to practice between sessions, writing a note as a reminder, and tracking  Malignant neoplasm of overlapping sites of left breast in female, estrogen receptor positive (CMS/HCC)   • History of stroke   • Arthritis   • Family history of breast cancer   • Encounter for screening mammogram for breast cancer   • Epistaxis   • Chemotherapy-induced thrombocytopenia   • Anemia associated with chemotherapy   • Substance or medication-induced sleep disorder, insomnia type (CMS/HCC)   • Body aches   • Neuropathy   • History of left breast cancer   • Other fatigue   • HIV infection (CMS/HCC)   • Fat necrosis of left breast        Lymphedema     Row Name 02/23/21 1617 02/23/21 1600          Subjective Pain    Able to rate subjective pain?  yes  -SC (r) RP (t) SC (c)  yes  -SC (r) RP (t) SC (c)     Pre-Treatment Pain Level  0  -SC (r) RP (t) SC (c)  0  -SC (r) RP (t) SC (c)        Subjective Comments    Subjective Comments  Pt reported that overall she is doing well. However she has noticed that there is leaking fluid and pt has to apply gauze multiple times throughout the day.  -SC (r) RP (t) SC (c)  --  -SC (r) RP (t) SC (c)        Manual Lymphatic Drainage    Manual Lymphatic Drainage  initial sequence;opened regional lymph nodes;opened anastamoses;extremity treatment  -SC (r) RP (t) SC (c)  --     Initial Sequence  supraclavicular;shoulder collectors;abdomen;diaphragmatic breathing  -SC (r) RP (t) SC (c)  --     Supraclavicular  right;left  -SC (r) RP (t) SC (c)  --     Shoulder Collectors  right;left  -SC (r) RP (t) SC (c)  --     Abdomen  superficial  -SC (r) RP (t) SC (c)  --     Opened Regional Lymph Nodes  axillary;inguinal;ribs  -SC (r) RP (t) SC (c)  --     Axillary  right;left  -SC (r) RP (t) SC (c)  --     Inguinal  left  -SC (r) RP (t) SC (c)  --     Opened Anastamoses  anterior axillo-axillary;axillo-inguinal  -SC (r) RP (t) SC (c)  --     Axillo-Inguinal  left  -SC (r) RP (t) SC (c)  --     Extremity Treatment  simple/brief MLD;MLD to proximal limb only  -SC (r) RP (t) SC (c)  --      times he practices.     Behavioral Observations   General Appearance: Well groomed, appropriate eye contact  Attitude/Behavior: Cooperative  Motor: No psychomotor agitation or retardation, no tremor or other abnormal movements  Speech: Normal rate, volume, prosody  Gait/Station: WFL - Within functional limits  Affect: Euthymic, full-range  Thought Process: Linear, goal directed  Thought Associations: No loosening of associations  Thought Content: Normal  Perception: No perceptual abnormalities noted  Sensorium: Alert  Insight: Fair  Judgement: Fair    Diagnosis:   1. Memory loss    2. Attention or concentration deficit    3. Cognitive impairment    4. History of traumatic brain injury       Plan:   The next in-person session will be on 10/25 at 3PM. For home practice, he will complete the goal management framework for creating a toolbox and practice ukulele 3x per week.The plan for the next session is to complete a check-in, review home practice, and discuss methods to use CogSMART strategies in everyday life.       Time:   3087-0842   Simple/Brief MLD  left  -SC (r) RP (t) SC (c)  --     MLD to Proximal Limb Only  left  -SC (r) RP (t) SC (c)  --     Manual Lymphatic Drainage Comments  left breast MLD  -SC (r) RP (t) SC (c)  --     Manual Therapy  MLD left breast, scar massage lumpectomy and lat flap incision, myofascial release left pec, anterior shoulder, medial arm, axilla, lateral flank, subscapularis release, AP and inferior joint mobs L GHJ, lateral distractions, inferior oscillations, PROM all planes, gua sha/myofascial release left pec, into lateral breast and inferior breast at incision site/lymphedema of lumpectomy site. Applied steristip and education of use.   -SC (r) RP (t) SC (c)  --        Compression/Skin Care    Compression/Skin Care Comments  Pt presented to session wearing custom bra. Pt experiencing leakage in the L breast around biopsy site.   -SC (r) RP (t) SC (c)  --       User Key  (r) = Recorded By, (t) = Taken By, (c) = Cosigned By    Initials Name Provider Type    SC Yola Valenzuela, PT Physical Therapist    RP Zuly Contreras, YOLANDA Student PT Student                        PT Assessment/Plan     Row Name 02/23/21 6424          PT Assessment    Functional Limitations  Limitations in functional capacity and performance;Performance in leisure activities;Limitations in community activities;Performance in sport activities  -SC (r) RP (t) SC (c)     Impairments  Endurance;Impaired flexibility;Impaired lymphatic circulation;Impaired muscle endurance;Integumentary integrity;Joint mobility;Muscle strength;Pain;Peripheral nerve integrity;Poor body mechanics;Posture;Range of motion;Sensation  -SC (r) RP (t) SC (c)     Assessment Comments  Assessed and updated goals. Pt presents w/ persistent L shoulder stiffness, decreased ROM, and decreased skin and breast mobility. Pt responds well to manual therapy/gua sha and demonstrates improvement in shoulder ROM and skin mobility. Pt expressed concern over leakage in L breast near  breast biopsy site that she has been using guaze at home to treat; did not notice any signs of infection and applied steri strip and instructed pt to not massage directly over the steri strip for 3-5 days and allow the strip to naturally fall off. Updated pt's HEP to include RC and scapular strengthening exercises to address lingering L shoulder pain.  -SC (r) RP (t) SC (c)        PT Plan    PT Plan Comments  cont w/ wearing custom bra, swell spot, steri strip care, adjust self-massage for 3-5 days, update pt's HEP as indicated  -SC (r) RP (t) SC (c)       User Key  (r) = Recorded By, (t) = Taken By, (c) = Cosigned By    Initials Name Provider Type    Yola Rodriguez, PT Physical Therapist    Zuly Russ, PT Student PT Student             OP Exercises     Row Name 02/23/21 1617 02/23/21 1600          Subjective Comments    Subjective Comments  Pt reported that overall she is doing well. However she has noticed that there is leaking fluid and pt has to apply gauze multiple times throughout the day.  -SC (r) RP (t) SC (c)  --  -SC (r) RP (t) SC (c)        Subjective Pain    Able to rate subjective pain?  yes  -SC (r) RP (t) SC (c)  yes  -SC (r) RP (t) SC (c)     Pre-Treatment Pain Level  0  -SC (r) RP (t) SC (c)  0  -SC (r) RP (t) SC (c)        Total Minutes    84317 - PT Therapeutic Exercise Minutes  15  -SC (r) RP (t) SC (c)  --     51585 - PT Manual Therapy Minutes  12  -SC (r) RP (t) SC (c)  --        Exercise 1    Exercise Name 1  SL ER L   -SC (r) RP (t) SC (c)  --     Sets 1  2  -SC (r) RP (t) SC (c)  --     Reps 1  10  -SC (r) RP (t) SC (c)  --        Exercise 2    Exercise Name 2  PNF shld D2 TB HL  -SC (r) RP (t) SC (c)  --     Sets 2  1  -SC (r) RP (t) SC (c)  --     Reps 2  10  -SC (r) RP (t) SC (c)  --     Additional Comments  GTB  -SC (r) RP (t) SC (c)  --        Exercise 3    Exercise Name 3  shld horz abd TB HL  -SC (r) RP (t) SC (c)  --     Sets 3  2  -SC (r) RP (t) SC (c)  --     Reps  3  10  -SC (r) RP (t) SC (c)  --     Additional Comments  GTB  -SC (r) RP (t) SC (c)  --        Exercise 4    Exercise Name 4  shld rows/ext TB standing B  -SC  --     Sets 4  2  -SC  --     Reps 4  10  -SC  --     Additional Comments  GTB  -SC  --       User Key  (r) = Recorded By, (t) = Taken By, (c) = Cosigned By    Initials Name Provider Type    Yola Rodriguez, PT Physical Therapist    Zuly Russ, PT Student PT Student                     Manual Rx (last 36 hours)      Manual Treatments     Row Name 02/23/21 1617             Total Minutes    45709 - PT Manual Therapy Minutes  12  -SC (r) RP (t) SC (c)         Manual Rx 1    Manual Rx 1 Location  left shoulder supine on plinth drapped in gown HOB elevated/HL  -SC (r) RP (t) SC (c)      Manual Rx 1 Type  AP and inferior joint mobs L GHJ, PROM all planes, gentle myofascial release medial arm with retrograde massage to axilla, scar massage lat flap reconstruction, gentle left breast mobilizations  -SC (r) RP (t) SC (c)      Manual Rx 1 Grade  III-IV  -SC (r) RP (t) SC (c)         Manual Rx 2    Manual Rx 2 Location  R SL scapular mobilizations  -SC (r) RP (t) SC (c)      Manual Rx 2 Type  Depression, elevation, protraction, retraction  -SC (r) RP (t) SC (c)      Manual Rx 2 Grade  Grade I  -SC (r) RP (t) SC (c)        User Key  (r) = Recorded By, (t) = Taken By, (c) = Cosigned By    Initials Name Provider Type    Yola Rodriguez, PT Physical Therapist    Zuly Russ, PT Student PT Student          PT OP Goals     Row Name 02/23/21 1617          PT Short Term Goals    STG 1  Patient independent and compliant with initial home exercise program focused on diaphragmatic breathing, range of motion, flexibility to decrease edema and improve lymphatic flow for decreased edema and decreased risk of infection.   -SC (r) RP (t) SC (c)     STG 1 Progress  Met  -SC (r) RP (t) SC (c)     STG 2  Patient demonstrate proper awareness of What is  Lymphedema and 18 Steps of Prevention for improved prevention, management, care of symptoms and ease of transition to self-care of condition.   -SC (r) RP (t) SC (c)     STG 2 Progress  Met  -SC (r) RP (t) SC (c)     STG 3  Patient demonstrate independence and compliance with proper skin care during/post radiation for improved mobility, decreased scar tissue, axillary cording and edema.  -SC (r) RP (t) SC (c)     STG 3 Progress  Met  -SC (r) RP (t) SC (c)     STG 4  Patient independent and compliant with breast mobilization techniques for improved mobility, skin care and lymphatic flow.  -SC (r) RP (t) SC (c)     STG 4 Progress  Met  -SC (r) RP (t) SC (c)     STG 5  Patient independent and compliant with daytime OTS prevention compression garments as indicated for decreased risk of lymphedema and infection and safety with transition of self-care of condition.   -SC (r) RP (t) SC (c)     STG 5 Progress  Met  -SC (r) RP (t) SC (c)        Long Term Goals    LTG Date to Achieve  03/17/21  -SC (r) RP (t) SC (c)     LTG 1  Patient's bioimpedance score to remain between -10 and 6.5 for decreased risk of developing stage II post lumpectomy lymphedema syndrome left UE and to establish treatment for early intervention of condition if/when indicated.  -SC (r) RP (t) SC (c)     LTG 1 Progress  Progressing baseline post op 06/18/2020 -4.7  -SC (r) RP (t) SC (c)     LTG 1 Progress Comments  insurance does not cover, was slightly elevated in Dec 2020, to reassess Feb to April 2021 pending progress  -SC (r) RP (t) SC (c)     LTG 2  Patient independent and compliant with advanced Home Exercise Program and self-care techniques for self-management of condition.   -SC (r) RP (t) SC (c)     LTG 2 Progress  Progressing  -SC (r) RP (t) SC (c)     LTG 2 Progress Comments  Pt is compliant w/ current HEP and self-massage techniques  -SC (r) RP (t) SC (c)     LTG 3  Patient able to wear fitted post lumpectomy/radiation bra with padding left  (if indicated)  >/=6-8 hours for work schedule for improved compression to lateral flank/left breast.  -SC (r) RP (t) SC (c)     LTG 3 Progress  Progressing  -SC (r) RP (t) SC (c)     LTG 3 Progress Comments  Pt presented to therapy wearing custom bra. Pt reported that she wears is but does report that it gets uncomfortable by the end of the day.  -SC (r) RP (t) SC (c)     LTG 4  Patient transition to cardiovascular exercise program (walking) >/=150 to 180 mins/week with moderate intensity for improved heart health, weight loss, decreased risk of osteoporosis and improved phase angle/metabolic rate.  -SC (r) RP (t) SC (c)     LTG 4 Progress  Met  -SC (r) RP (t) SC (c)     LTG 5  Patient score </=25% on Quick DASH for improved functional use of L UE home, community, and sleep  -SC (r) RP (t) SC (c)     LTG 5 Progress  Met initial evaluation 50%  -SC (r) RP (t) SC (c)     LTG 6  Patient with negative impingement testing left shoulder for improved mobility, posture and decreased pain with ADLs and home activities  -SC (r) RP (t) SC (c)     LTG 6 Progress  Progressing  -SC (r) RP (t) SC (c)     LTG 6 Progress Comments  Pt responds well to manual therapy/gua sha techniques.  -SC (r) RP (t) SC (c)     LTG 7  Patient with minimal fibrosis left breast for decreased risk of stage II lymphedema left and progression of decrease/infections  -SC (r) RP (t) SC (c)     LTG 7 Progress  Progressing  -SC (r) RP (t) SC (c)     LTG 7 Progress Comments  Pt's tissue presented to therapy w/ improved mobility and diminished swelling. Pt did report presence of leakage that she has applied gauze into her bra, applied steristrip to region, assess next session  -SC (r) RP (t) SC (c)        Time Calculation    PT Goal Re-Cert Due Date  03/17/21  -SC (r) RP (t) SC (c)       User Key  (r) = Recorded By, (t) = Taken By, (c) = Cosigned By    Initials Name Provider Type    Yola Rodriguez, PT Physical Therapist    RP Zuly Contreras, PT  Student PT Student          Therapy Education  Education Details: cont w/ manual therapy, cont w/ custom bra fitting, applied steri strip over area w/ leakage and educated to not massage directly over that area for 3-5 days to allow for healing  Given: Edema management, Symptoms/condition management, Other (comment), Posture/body mechanics, Bandaging/dressing change  Program: New, Reinforced, Modified  How Provided: Verbal, Demonstration  Provided to: Patient  Level of Understanding: Teach back education performed, Verbalized, Demonstrated              Time Calculation:   Start Time: 1617  Stop Time: 1706  Time Calculation (min): 49 min                 Yola Broderick, PT  2/23/2021

## 2023-10-12 ENCOUNTER — TREATMENT (OUTPATIENT)
Dept: OCCUPATIONAL THERAPY | Facility: HOSPITAL | Age: 33
End: 2023-10-12
Payer: MEDICARE

## 2023-10-12 ENCOUNTER — TELEMEDICINE (OUTPATIENT)
Dept: BEHAVIORAL HEALTH | Facility: CLINIC | Age: 33
End: 2023-10-12
Payer: MEDICARE

## 2023-10-12 DIAGNOSIS — R41.844 FRONTAL LOBE AND EXECUTIVE FUNCTION DEFICIT: ICD-10-CM

## 2023-10-12 DIAGNOSIS — F31.9 BIPOLAR 1 DISORDER (MULTI): ICD-10-CM

## 2023-10-12 DIAGNOSIS — R41.89 COGNITIVE IMPAIRMENT: ICD-10-CM

## 2023-10-12 DIAGNOSIS — F90.0 ATTENTION DEFICIT HYPERACTIVITY DISORDER (ADHD), PREDOMINANTLY INATTENTIVE TYPE: ICD-10-CM

## 2023-10-12 DIAGNOSIS — R41.89 COGNITIVE IMPAIRMENT: Primary | ICD-10-CM

## 2023-10-12 PROCEDURE — 97537 COMMUNITY/WORK REINTEGRATION: CPT | Mod: GO | Performed by: OCCUPATIONAL THERAPIST

## 2023-10-12 PROCEDURE — 90834 PSYTX W PT 45 MINUTES: CPT | Performed by: PSYCHOLOGIST

## 2023-10-12 NOTE — PROGRESS NOTES
8 AM [address] Ind Psych for ADHD, bipolar I, executive/cognitive dysfunction (45 MIN, 9744-1508)  Virtual.  Supportive Therapy. Asked out para-. Discussed potential problems that might arise. Still walking/running regularly. 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 Next: 2 weeks.

## 2023-10-12 NOTE — PROGRESS NOTES
Occupational Therapy    Occupational Therapy Treatment    Name: Paige Ybarra  MRN: [id number]  : 1990  Date: 10/12/23       Assessment:     Plan:       Subjective : Patient expressing some doubts with his desire to work and feel more useful.      Objective       Community/Work Reintegration Training: Patient demonstrating confusion with his desire for work. He voiced the strongest motivator is feeling useful.  Money and learning are secondary.  Patient participated in time management wheel completion to help identify his current activity and to analyze how often he completes tasks where he feels useful.  Patient identified need for 2 different wheels, one for M and W and another for T, Th, F.   MW wheel- 9 hours sleep, 5 hours being useful- volunteering at [employer] and [employer],  2 hours of self care, 1 hour of cooking, 5 hours of misc.   T, TH, F wheel- sleep 9 hours, 7 hours being useful (Visionnaire), self care 1 hour,  4 hours misc. , 1 hour cooking  Patient to spend some time assessing his time management wheel and identify activities that may be missing from his day or where he feels he is spending too much time. Patient will bring this back to next session.        OP EDUCATION: purpose of time management wheel, home program       Goals:  Active       goals:       Patient will complete work exploration identifying personal goals, job options, and independently comparing work options to his goals. (Progressing)       Start:  10/05/23    Expected End:  24            Patient will complete work training independently with use of compensatory tools/techniques (Progressing)       Start:  10/05/23    Expected End:  24            Patient will complete alternative activities to work such as leisure or schooling to meet personal goals independently using compensatory tools/techniques. (Progressing)       Start:  10/05/23    Expected End:  24            Patient will complete work  exploration, interview, application,procurement and job tasks independently using compensatory techniques (Progressing)       Start:  10/05/23    Expected End:  01/04/24            Patient will independently use memory, planning, problem solving, and decision making cognitive aids., (Progressing)       Start:  10/05/23    Expected End:  01/04/24

## 2023-10-18 ENCOUNTER — OFFICE VISIT (OUTPATIENT)
Dept: PSYCHOLOGY | Facility: HOSPITAL | Age: 33
End: 2023-10-18
Payer: MEDICARE

## 2023-10-18 DIAGNOSIS — R41.89 COGNITIVE IMPAIRMENT: Primary | ICD-10-CM

## 2023-10-18 DIAGNOSIS — R41.844 FRONTAL LOBE AND EXECUTIVE FUNCTION DEFICIT: ICD-10-CM

## 2023-10-18 DIAGNOSIS — S06.9X5S: ICD-10-CM

## 2023-10-18 PROCEDURE — 96158 HLTH BHV IVNTJ INDIV 1ST 30: CPT | Performed by: PSYCHOLOGIST

## 2023-10-18 NOTE — PROGRESS NOTES
"Neuropsychology Note    Name: Paige Ybarra    Date of Service: October 18, 2023     Reason For Visit: Neuropsychological Rehabilitation    Summary of Visit: Paige arrived late due to his paratransit ride picking him up late. 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 Otherwise, all was well, though he continues to be frustrated with not finding someone to date. We went through our usual review, as follows:  4 pm rule: Compliant with one \"cheat\"  Exercise: Doing well, sends email with details to me each day, is now running 1.6 mile and then walking  New recipe: No new recipes because he is eating food from his freezer; will shop and cook this weekend  Reading: Compliant with reading assignments from his ; daily reading news feed  Cleaning: Doing well with kitchen; fair with bathroom  Ukelele practice: Has only done once, saying he does not have a song to practice; I asked how he could get one, and he replied on the internet, and then extrapolated from that that he needs to be more self-directed. This led to a discussion of next steps in his progress toward independence including his initiating and solving problems more on his own  Work: Compliant with current jobs; working with OT to consider changes  Driving: New-kylie goal; plan is to first get medical clearance, then an OT evaluation, and then possibly driving lessons    Overall plan is to reach full compliance with this plan without prompts and being held accountable by others, to the degree possible.    Time: 3:25 pm - 3:59 pm    Next Session: November 1, 2023    "

## 2023-10-19 ENCOUNTER — APPOINTMENT (OUTPATIENT)
Dept: BEHAVIORAL HEALTH | Facility: CLINIC | Age: 33
End: 2023-10-19
Payer: MEDICARE

## 2023-10-25 ENCOUNTER — OFFICE VISIT (OUTPATIENT)
Dept: PSYCHOLOGY | Facility: HOSPITAL | Age: 33
End: 2023-10-25
Payer: MEDICARE

## 2023-10-25 DIAGNOSIS — R41.840 ATTENTION OR CONCENTRATION DEFICIT: ICD-10-CM

## 2023-10-25 DIAGNOSIS — Z87.820 HISTORY OF TRAUMATIC BRAIN INJURY: ICD-10-CM

## 2023-10-25 DIAGNOSIS — R41.3 MEMORY LOSS: Primary | ICD-10-CM

## 2023-10-25 DIAGNOSIS — R41.89 COGNITIVE IMPAIRMENT: ICD-10-CM

## 2023-10-25 PROCEDURE — 96159 HLTH BHV IVNTJ INDIV EA ADDL: CPT | Mod: AH

## 2023-10-25 PROCEDURE — 96159 HLTH BHV IVNTJ INDIV EA ADDL: CPT

## 2023-10-25 PROCEDURE — 96158 HLTH BHV IVNTJ INDIV 1ST 30: CPT | Mod: AH

## 2023-10-25 PROCEDURE — 96158 HLTH BHV IVNTJ INDIV 1ST 30: CPT

## 2023-10-25 NOTE — PROGRESS NOTES
Name: Paige Ybarra   MRN: [id number]   Age: 33 y.o.   Date of Service: 10/25/2023    Chief Complaint:   Paige is being seen for neuropsychological rehabilitation for cognitive and emotional problems due to traumatic brain injury sustained in the context of bipolar disorder.     Session Details:   The topics of the session included completing a check-in, reviewing home practice, and completing a problem solving worksheet. During the check-in, he reported doing a kind deed recently. 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 We discussed others ways to make friends, and he plans on downloading Bumble BFF and reaching out to old friends. The home practice was reviewed to complete the goal management framework for creating a toolbox (not completed) and practice ukulele 3x per week (partially completed: 1-2x per week). He indicated that he did not complete the home practice because he forgot to look at his notebook with the home practice written in it; we discussed strategies to remember to complete home practice, such as leaving his notebook open and using Neumind. The remainder of the session focused on completing a problem solving worksheet to determine the most effective way to create a living toolbox for cognitive strategies. He generated possible solutions, such as creating Excel spreadsheet, flip book, poster, or book. We discussed things he should consider when doing pros/cons: money, time, effort, involving others, and emotional toll. We discussed pros/cons of each solution. He selected making an excel spreadsheet for his toolbox. We discussed steps to achieve the solution: (1) create an Excel spreadsheet with two sheets- cognitive problems with solutions and definition for each solution, (2) fill out spreadsheet, (3) refer to COGSMART manual, (4) schedule time in [employer] to work on spreadsheet, and (5) share with provider.    Behavioral Observations   General  Appearance: Well groomed, appropriate eye contact  Attitude/Behavior: Cooperative  Motor: No psychomotor agitation or retardation, no tremor or other abnormal movements  Speech: Normal rate, volume, prosody  Gait/Station: WFL - Within functional limits  Affect: Euthymic, full-range  Thought Process: Linear, goal directed  Thought Associations: No loosening of associations  Thought Content: Normal  Perception: No perceptual abnormalities noted  Sensorium: Alert  Insight: Fair  Judgement: Fair    Diagnosis:   1. Memory loss    2. Attention or concentration deficit    3. Cognitive impairment    4. History of traumatic brain injury      Plan:   The next in-person session will be scheduled at a later date. For home practice, he will create a cognitive toolbox, download [employer] BFF, and reach out to a friend. The plan for the next session is to complete a check-in, review home practice, and discuss methods to use CogSMART strategies in everyday life.     Time:   5135-4690

## 2023-10-26 ENCOUNTER — APPOINTMENT (OUTPATIENT)
Dept: BEHAVIORAL HEALTH | Facility: CLINIC | Age: 33
End: 2023-10-26
Payer: MEDICARE

## 2023-10-26 ENCOUNTER — TREATMENT (OUTPATIENT)
Dept: OCCUPATIONAL THERAPY | Facility: HOSPITAL | Age: 33
End: 2023-10-26
Payer: MEDICARE

## 2023-10-26 DIAGNOSIS — R41.840 ATTENTION OR CONCENTRATION DEFICIT: Primary | ICD-10-CM

## 2023-10-26 DIAGNOSIS — R41.89 COGNITIVE IMPAIRMENT: ICD-10-CM

## 2023-10-26 DIAGNOSIS — R41.844 FRONTAL LOBE AND EXECUTIVE FUNCTION DEFICIT: ICD-10-CM

## 2023-10-26 PROCEDURE — 97537 COMMUNITY/WORK REINTEGRATION: CPT | Mod: GO | Performed by: OCCUPATIONAL THERAPIST

## 2023-10-26 ASSESSMENT — PAIN SCALES - GENERAL: PAINLEVEL_OUTOF10: 0 - NO PAIN

## 2023-10-26 ASSESSMENT — PAIN - FUNCTIONAL ASSESSMENT: PAIN_FUNCTIONAL_ASSESSMENT: 0-10

## 2023-10-26 NOTE — PROGRESS NOTES
Occupational Therapy    Occupational Therapy Treatment    Name: Paige Ybarra  MRN: [id number]  : 1990  Date: 10/26/23       Assessment:  Patient is struggling with decision making and needs to do some thinking about personal goals and if he wants to pursue work or learning retraining. Encouraged patient to  share questions created today with mom who is his financial POA to help with his decision making process. He is demonstrating inconsistent performance of Home program.   He continues to express desire to be of use and desire for learning.  Patient however very concrete having difficulty seeing any options for learning beyond academics.      Plan: Will hold OT until patient makes decision about goal direction.        Subjective  Patient reports that he did not work anymore on his activity wheel.   He continues to struggle with indecision related to working on returning to work.   General:           Community/Work Reintegration Training:  Patient asked to create a list of information he would need to help indecision making. Had patient pull out goal management framework worksheet from previous session. Asked him to create a list of steps that would enable him to make a decision. He identified these with need for some prompting and suggestions:  need to find out information from disability related to his ability to work  -will disability agreement allow work?  -if work allowed how much?  -what would I have to give up?  -if returning to work and fail, could disability be reinstated?    Asked patient to identify who he can get answers from. He identified plan to ask his mom to help.    He identified desire to pursue learning. Patient identified options of taking a college course, doing a course on [place], but was unable to come up with additional ideas. Provided patient with adult learning interest survey.      Patient confused with [employer] savage use as a compensatory tool for memory due to changes in system.  Provided instruction on difference between a task and a reminder and how to get a time linked to something he needs to do. He had difficulty with setting reminder, repeating everyday, but was able to self correct.         OP EDUCATION:  Decision making process, use of Neumind, options for learning as an adult including non academic options       Goals:  Active       goals:       Patient will complete work exploration identifying personal goals, job options, and independently comparing work options to his goals. (Progressing)       Start:  10/05/23    Expected End:  01/04/24            Patient will complete work training independently with use of compensatory tools/techniques (Progressing)       Start:  10/05/23    Expected End:  01/04/24            Patient will complete alternative activities to work such as leisure or schooling to meet personal goals independently using compensatory tools/techniques. (Progressing)       Start:  10/05/23    Expected End:  01/04/24            Patient will complete work exploration, interview, application,procurement and job tasks independently using compensatory techniques (Progressing)       Start:  10/05/23    Expected End:  01/04/24            Patient will independently use memory, planning, problem solving, and decision making cognitive aids., (Progressing)       Start:  10/05/23    Expected End:  01/04/24

## 2023-10-31 ENCOUNTER — LAB (OUTPATIENT)
Dept: LAB | Facility: LAB | Age: 33
End: 2023-10-31
Payer: COMMERCIAL

## 2023-10-31 DIAGNOSIS — F31.70 BIPOLAR AFFECTIVE DISORDER IN REMISSION (MULTI): ICD-10-CM

## 2023-10-31 LAB
BASOPHILS # BLD AUTO: 0.03 X10*3/UL (ref 0–0.1)
BASOPHILS NFR BLD AUTO: 0.3 %
EOSINOPHIL # BLD AUTO: 0.11 X10*3/UL (ref 0–0.7)
EOSINOPHIL NFR BLD AUTO: 1.2 %
ERYTHROCYTE [DISTWIDTH] IN BLOOD BY AUTOMATED COUNT: 12.3 % (ref 11.5–14.5)
HCT VFR BLD AUTO: 43.3 % (ref 41–52)
HGB BLD-MCNC: 13.3 G/DL (ref 13.5–17.5)
IMM GRANULOCYTES # BLD AUTO: 0.02 X10*3/UL (ref 0–0.7)
IMM GRANULOCYTES NFR BLD AUTO: 0.2 % (ref 0–0.9)
LYMPHOCYTES # BLD AUTO: 2.09 X10*3/UL (ref 1.2–4.8)
LYMPHOCYTES NFR BLD AUTO: 23.2 %
MCH RBC QN AUTO: 29.2 PG (ref 26–34)
MCHC RBC AUTO-ENTMCNC: 30.7 G/DL (ref 32–36)
MCV RBC AUTO: 95 FL (ref 80–100)
MONOCYTES # BLD AUTO: 0.42 X10*3/UL (ref 0.1–1)
MONOCYTES NFR BLD AUTO: 4.7 %
NEUTROPHILS # BLD AUTO: 6.35 X10*3/UL (ref 1.2–7.7)
NEUTROPHILS NFR BLD AUTO: 70.4 %
NRBC BLD-RTO: 0 /100 WBCS (ref 0–0)
PLATELET # BLD AUTO: 234 X10*3/UL (ref 150–450)
PMV BLD AUTO: 11.9 FL (ref 7.5–11.5)
RBC # BLD AUTO: 4.55 X10*6/UL (ref 4.5–5.9)
WBC # BLD AUTO: 9 X10*3/UL (ref 4.4–11.3)

## 2023-10-31 PROCEDURE — 85025 COMPLETE CBC W/AUTO DIFF WBC: CPT

## 2023-10-31 PROCEDURE — 36415 COLL VENOUS BLD VENIPUNCTURE: CPT

## 2023-11-01 ENCOUNTER — OFFICE VISIT (OUTPATIENT)
Dept: NEUROLOGY | Facility: CLINIC | Age: 33
End: 2023-11-01
Payer: MEDICARE

## 2023-11-01 DIAGNOSIS — R56.9 SEIZURE (MULTI): Primary | ICD-10-CM

## 2023-11-01 PROCEDURE — 99215 OFFICE O/P EST HI 40 MIN: CPT | Performed by: PSYCHIATRY & NEUROLOGY

## 2023-11-01 PROCEDURE — 3074F SYST BP LT 130 MM HG: CPT | Performed by: PSYCHIATRY & NEUROLOGY

## 2023-11-01 PROCEDURE — 3079F DIAST BP 80-89 MM HG: CPT | Performed by: PSYCHIATRY & NEUROLOGY

## 2023-11-02 ENCOUNTER — APPOINTMENT (OUTPATIENT)
Dept: BEHAVIORAL HEALTH | Facility: CLINIC | Age: 33
End: 2023-11-02
Payer: MEDICARE

## 2023-11-02 ENCOUNTER — OFFICE VISIT (OUTPATIENT)
Dept: PSYCHOLOGY | Facility: HOSPITAL | Age: 33
End: 2023-11-02
Payer: MEDICARE

## 2023-11-02 DIAGNOSIS — Z87.820 HISTORY OF TRAUMATIC BRAIN INJURY: ICD-10-CM

## 2023-11-02 DIAGNOSIS — R41.844 FRONTAL LOBE AND EXECUTIVE FUNCTION DEFICIT: ICD-10-CM

## 2023-11-02 DIAGNOSIS — R41.89 COGNITIVE IMPAIRMENT: Primary | ICD-10-CM

## 2023-11-02 DIAGNOSIS — R41.840 ATTENTION OR CONCENTRATION DEFICIT: ICD-10-CM

## 2023-11-02 PROCEDURE — 96158 HLTH BHV IVNTJ INDIV 1ST 30: CPT | Performed by: PSYCHOLOGIST

## 2023-11-02 PROCEDURE — 96159 HLTH BHV IVNTJ INDIV EA ADDL: CPT | Performed by: PSYCHOLOGIST

## 2023-11-02 PROCEDURE — 3074F SYST BP LT 130 MM HG: CPT | Performed by: PSYCHOLOGIST

## 2023-11-02 PROCEDURE — 3079F DIAST BP 80-89 MM HG: CPT | Performed by: PSYCHOLOGIST

## 2023-11-02 NOTE — PROGRESS NOTES
Neuropsychology Note    Name: Paige Ybarra    Date of Service: 11/02/2023     Reason For Visit: Neuropsychological Rehabilitation    Summary of Visit: Paige reported that he was cleared for driving by his epilepsy neurologist. His understanding is that his next step is to undergo driving evaluation with Occupational Therapy. I will put in an order for this. Paige also wished to discuss his wish to join a new dating savage. We discussed pros and cons, risks, and made a plan to evaluate how it is working for him over time.  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 He entered a reminder into his prompting savage. He had a concern about the savage and will email the savage developers.    Time: 11:01 am - 11:58 am    Next Session:  11/15/2023

## 2023-11-06 RX ORDER — LACOSAMIDE 150 MG/1
150 TABLET ORAL 2 TIMES DAILY
Qty: 60 TABLET | Refills: 5 | Status: SHIPPED | OUTPATIENT
Start: 2023-11-06 | End: 2024-05-21 | Stop reason: SDUPTHER
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
## 2023-11-09 ENCOUNTER — TELEMEDICINE (OUTPATIENT)
Dept: BEHAVIORAL HEALTH | Facility: CLINIC | Age: 33
End: 2023-11-09
Payer: MEDICARE

## 2023-11-09 DIAGNOSIS — F90.0 ATTENTION DEFICIT HYPERACTIVITY DISORDER (ADHD), PREDOMINANTLY INATTENTIVE TYPE: ICD-10-CM

## 2023-11-09 DIAGNOSIS — R41.844 EXECUTIVE FUNCTION DEFICIT: ICD-10-CM

## 2023-11-09 DIAGNOSIS — F31.9 BIPOLAR 1 DISORDER (MULTI): ICD-10-CM

## 2023-11-09 PROCEDURE — 90834 PSYTX W PT 45 MINUTES: CPT | Performed by: PSYCHOLOGIST

## 2023-11-09 NOTE — PROGRESS NOTES
8 AM [address] [facility] Psych for ADHD, bipolar I, executive/cognitive dysfunction (45 MIN, [phone])  Virtual. Supportive Therapy. Thinking of signing up for gardening class at [facility]. Discussed plant management. Trying to put dating on backburner and focus on other areas of life. Mentioned a friends meet up site, discussed pros/cons and finding activities where he could get to know others with more consistent exposure (e.g., bowling, game meetup, volleyball team, etc). Sister had a baby so he's getting used to being an uncle. Still getting to Home Comfort Zones. Discussed holidays. Next: 3 weeks.

## 2023-11-15 ENCOUNTER — OFFICE VISIT (OUTPATIENT)
Dept: PSYCHOLOGY | Facility: HOSPITAL | Age: 33
End: 2023-11-15
Payer: MEDICARE

## 2023-11-15 DIAGNOSIS — R41.89 COGNITIVE IMPAIRMENT: Primary | ICD-10-CM

## 2023-11-15 DIAGNOSIS — R41.840 ATTENTION OR CONCENTRATION DEFICIT: ICD-10-CM

## 2023-11-15 DIAGNOSIS — R41.844 FRONTAL LOBE AND EXECUTIVE FUNCTION DEFICIT: ICD-10-CM

## 2023-11-15 DIAGNOSIS — Z87.820 HISTORY OF TRAUMATIC BRAIN INJURY: ICD-10-CM

## 2023-11-15 PROCEDURE — 96159 HLTH BHV IVNTJ INDIV EA ADDL: CPT | Performed by: PSYCHOLOGIST

## 2023-11-15 PROCEDURE — 96158 HLTH BHV IVNTJ INDIV 1ST 30: CPT | Performed by: PSYCHOLOGIST

## 2023-11-15 NOTE — PROGRESS NOTES
"Neuropsychology Note    Name: Paige Ybarra    Date of Service: 11/15/2023     Reason For Visit: Neuropsychological Rehabilitation    Summary of Visit: We reviewed Paige's overall goals today. One of these is \"Driving\". He wa recently cleared medically for driving. However, he has re-considered whether he actually wants to get a car and drive, now that he has mastered both paratransit and public transport use.  A car plus all the additional expenses would be very costly, whereas his current transport plan works very well for him. We agreed to change this goal from \"Driving\" to \"Move,\" his choice of new goal. 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 Paid work would threaten his benefits, which feels like a big risk to him. We agreed that he has met both of these goals. The third goal of \"Independent living\" has also been achieved, though he continues to need help with maintaining his schedule and completing all activities.  Paige brought up wanting to have a debit card, as this would mean having to keep less cash on hand. I suggested he talk this over with his mother. I have no objections to this. He is going to tour a new apartment soon, since his lease will lapse in April. He is considering taking a course on plants at [facility] in the spring. This week, he did not complete his usual activities due to getting food poisoning.   Overall, Paige continues to do well in maintaining a meaningful life with considerable independence.    Time: 3:00 pm - 3:59 pm    Next Session:  11/29/2023    "

## 2023-11-16 ENCOUNTER — APPOINTMENT (OUTPATIENT)
Dept: BEHAVIORAL HEALTH | Facility: CLINIC | Age: 33
End: 2023-11-16
Payer: MEDICARE

## 2023-11-21 ENCOUNTER — LAB (OUTPATIENT)
Dept: LAB | Facility: LAB | Age: 33
End: 2023-11-21
Payer: MEDICARE

## 2023-11-21 DIAGNOSIS — F31.70 BIPOLAR AFFECTIVE DISORDER IN REMISSION (MULTI): ICD-10-CM

## 2023-11-21 LAB
BASOPHILS # BLD AUTO: 0.04 X10*3/UL (ref 0–0.1)
BASOPHILS NFR BLD AUTO: 0.4 %
EOSINOPHIL # BLD AUTO: 0.11 X10*3/UL (ref 0–0.7)
EOSINOPHIL NFR BLD AUTO: 1 %
ERYTHROCYTE [DISTWIDTH] IN BLOOD BY AUTOMATED COUNT: 12.3 % (ref 11.5–14.5)
HCT VFR BLD AUTO: 40.4 % (ref 41–52)
HGB BLD-MCNC: 12.8 G/DL (ref 13.5–17.5)
IMM GRANULOCYTES # BLD AUTO: 0.04 X10*3/UL (ref 0–0.7)
IMM GRANULOCYTES NFR BLD AUTO: 0.4 % (ref 0–0.9)
LYMPHOCYTES # BLD AUTO: 2.18 X10*3/UL (ref 1.2–4.8)
LYMPHOCYTES NFR BLD AUTO: 19.3 %
MCH RBC QN AUTO: 28.9 PG (ref 26–34)
MCHC RBC AUTO-ENTMCNC: 31.7 G/DL (ref 32–36)
MCV RBC AUTO: 91 FL (ref 80–100)
MONOCYTES # BLD AUTO: 0.43 X10*3/UL (ref 0.1–1)
MONOCYTES NFR BLD AUTO: 3.8 %
NEUTROPHILS # BLD AUTO: 8.49 X10*3/UL (ref 1.2–7.7)
NEUTROPHILS NFR BLD AUTO: 75.1 %
NRBC BLD-RTO: 0 /100 WBCS (ref 0–0)
PLATELET # BLD AUTO: 277 X10*3/UL (ref 150–450)
RBC # BLD AUTO: 4.43 X10*6/UL (ref 4.5–5.9)
WBC # BLD AUTO: 11.3 X10*3/UL (ref 4.4–11.3)

## 2023-11-21 PROCEDURE — 36415 COLL VENOUS BLD VENIPUNCTURE: CPT

## 2023-11-21 PROCEDURE — 85025 COMPLETE CBC W/AUTO DIFF WBC: CPT

## 2023-11-22 ENCOUNTER — APPOINTMENT (OUTPATIENT)
Dept: PSYCHOLOGY | Facility: CLINIC | Age: 33
End: 2023-11-22
Payer: COMMERCIAL

## 2023-11-28 ENCOUNTER — APPOINTMENT (OUTPATIENT)
Dept: PSYCHOLOGY | Facility: HOSPITAL | Age: 33
End: 2023-11-28
Payer: COMMERCIAL

## 2023-11-29 ENCOUNTER — APPOINTMENT (OUTPATIENT)
Dept: PSYCHOLOGY | Facility: HOSPITAL | Age: 33
End: 2023-11-29
Payer: MEDICARE

## 2023-11-29 ENCOUNTER — TELEMEDICINE CLINICAL SUPPORT (OUTPATIENT)
Dept: PSYCHOLOGY | Facility: HOSPITAL | Age: 33
End: 2023-11-29
Payer: MEDICARE

## 2023-11-29 DIAGNOSIS — R41.3 MEMORY LOSS: Primary | ICD-10-CM

## 2023-11-29 DIAGNOSIS — R41.89 COGNITIVE IMPAIRMENT: ICD-10-CM

## 2023-11-29 DIAGNOSIS — Z87.820 HISTORY OF TRAUMATIC BRAIN INJURY: ICD-10-CM

## 2023-11-29 DIAGNOSIS — R41.840 ATTENTION OR CONCENTRATION DEFICIT: ICD-10-CM
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
## 2023-11-30 ENCOUNTER — TELEMEDICINE (OUTPATIENT)
Dept: BEHAVIORAL HEALTH | Facility: CLINIC | Age: 33
End: 2023-11-30
Payer: MEDICARE

## 2023-11-30 DIAGNOSIS — F31.9 BIPOLAR 1 DISORDER (MULTI): ICD-10-CM

## 2023-11-30 DIAGNOSIS — F90.0 ATTENTION DEFICIT HYPERACTIVITY DISORDER (ADHD), PREDOMINANTLY INATTENTIVE TYPE: ICD-10-CM

## 2023-11-30 DIAGNOSIS — R41.89 COGNITIVE IMPAIRMENT: ICD-10-CM

## 2023-11-30 PROCEDURE — 90834 PSYTX W PT 45 MINUTES: CPT | Performed by: PSYCHOLOGIST

## 2023-11-30 NOTE — PROGRESS NOTES
8 AM [address] [facility] Psych for ADHD, bipolar I, executive/cognitive dysfunction (45 MIN, [phone])   Virtual. Supportive Therapy. Going to bi-monthly with rehab psychologists. Will continue to see him bimonthly. Thanksgiving with family at mother's. Moving across street Dec 20, going from 1 bedroom to 2 bedroom, apartment rental no longer renting units in current place so had to move. Mom's bf will help with move. Met woman from [place], on [employer] match, thinks she's a student at [employer], hoping to meet up soon. Not exercising but hopes to get back to it. Next: 2 weeks.

## 2023-12-05 ENCOUNTER — TELEPHONE (OUTPATIENT)
Dept: BEHAVIORAL HEALTH | Facility: CLINIC | Age: 33
End: 2023-12-05
Payer: MEDICARE

## 2023-12-05 ENCOUNTER — APPOINTMENT (OUTPATIENT)
Dept: PSYCHOLOGY | Facility: HOSPITAL | Age: 33
End: 2023-12-05
Payer: MEDICARE

## 2023-12-11 PROBLEM — T14.90XA TRAUMA: Status: ACTIVE | Noted: 2017-11-27

## 2023-12-11 PROBLEM — R41.3 CONFABULATION: Status: ACTIVE | Noted: 2023-12-11

## 2023-12-11 PROBLEM — F31.60 BIPOLAR AFFECTIVE, MIXED (MULTI): Status: ACTIVE | Noted: 2018-05-03

## 2023-12-11 PROBLEM — S06.2X9A: Status: ACTIVE | Noted: 2023-12-11

## 2023-12-11 PROBLEM — S09.92XA: Status: ACTIVE | Noted: 2017-11-27

## 2023-12-11 PROBLEM — S06.309A: Status: ACTIVE | Noted: 2017-12-14

## 2023-12-11 PROBLEM — R41.840 ATTENTION AND CONCENTRATION DEFICIT: Status: ACTIVE | Noted: 2018-03-08

## 2023-12-11 PROBLEM — F02.818: Status: ACTIVE | Noted: 2018-05-03

## 2023-12-11 PROBLEM — Z97.8 USES FEEDING TUBE: Status: ACTIVE | Noted: 2017-12-06

## 2023-12-11 PROBLEM — R27.8 COORDINATION IMPAIRMENT: Status: ACTIVE | Noted: 2018-05-17

## 2023-12-11 PROBLEM — J34.3 HYPERTROPHY OF NASAL TURBINATES: Status: ACTIVE | Noted: 2021-08-09

## 2023-12-11 PROBLEM — S01.81XD FOREHEAD LACERATION, SUBSEQUENT ENCOUNTER: Status: ACTIVE | Noted: 2017-11-27

## 2023-12-11 PROBLEM — S06.9XAS: Status: ACTIVE | Noted: 2018-05-03

## 2023-12-11 PROBLEM — S06.9XAA TBI (TRAUMATIC BRAIN INJURY) (MULTI): Status: ACTIVE | Noted: 2017-12-06

## 2023-12-11 PROBLEM — Z97.8 ENDOTRACHEALLY INTUBATED: Status: ACTIVE | Noted: 2017-11-27

## 2023-12-11 PROBLEM — R26.89 BALANCE DISORDER: Status: ACTIVE | Noted: 2018-05-17

## 2023-12-11 PROBLEM — T17.208A: Status: ACTIVE | Noted: 2017-11-27

## 2023-12-11 PROBLEM — J30.2 SEASONAL ALLERGIC RHINITIS: Status: ACTIVE | Noted: 2021-05-06
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
## 2023-12-11 PROBLEM — Z93.0 TRACHEOSTOMY STATUS (MULTI): Status: ACTIVE | Noted: 2017-12-06

## 2023-12-11 PROBLEM — E44.1 MALNUTRITION OF MILD DEGREE (MULTI): Status: ACTIVE | Noted: 2017-11-28

## 2023-12-11 PROBLEM — S00.12XA PERIORBITAL ECCHYMOSIS OF LEFT EYE: Status: ACTIVE | Noted: 2017-11-27

## 2023-12-11 PROBLEM — R41.841 COGNITIVE COMMUNICATION DISORDER: Status: ACTIVE | Noted: 2018-03-08

## 2023-12-11 PROBLEM — S06.2X9S: Status: ACTIVE | Noted: 2017-12-06

## 2023-12-11 RX ORDER — QUETIAPINE FUMARATE 50 MG/1
1 TABLET, FILM COATED ORAL
COMMUNITY
Start: 2018-06-28 | End: 2023-12-21 | Stop reason: ALTCHOICE

## 2023-12-11 RX ORDER — OLANZAPINE 5 MG/1
1 TABLET ORAL
COMMUNITY
Start: 2018-06-28 | End: 2023-12-21 | Stop reason: ALTCHOICE

## 2023-12-11 RX ORDER — OXCARBAZEPINE 600 MG/1
1 TABLET, FILM COATED ORAL NIGHTLY
COMMUNITY
Start: 2018-06-28 | End: 2023-12-21 | Stop reason: ALTCHOICE

## 2023-12-11 RX ORDER — HEPARIN SODIUM 5000 [USP'U]/ML
5000 INJECTION, SOLUTION INTRAVENOUS; SUBCUTANEOUS 2 TIMES DAILY
COMMUNITY
Start: 2017-12-13

## 2023-12-11 RX ORDER — SENNOSIDES 8.6 MG/1
8.6 TABLET ORAL 2 TIMES DAILY
COMMUNITY
Start: 2017-12-13 | End: 2023-12-21 | Stop reason: ALTCHOICE

## 2023-12-11 RX ORDER — METHYLPHENIDATE HYDROCHLORIDE 54 MG/1
54 TABLET ORAL
COMMUNITY
End: 2023-12-21 | Stop reason: ALTCHOICE

## 2023-12-11 RX ORDER — ACETAMINOPHEN, DIPHENHYDRAMINE HCL, PHENYLEPHRINE HCL 325; 25; 5 MG/1; MG/1; MG/1
1 TABLET ORAL NIGHTLY
COMMUNITY
Start: 2018-01-18

## 2023-12-11 RX ORDER — QUETIAPINE FUMARATE 300 MG/1
1 TABLET, FILM COATED ORAL NIGHTLY
COMMUNITY
Start: 2018-06-28 | End: 2023-12-21 | Stop reason: ALTCHOICE

## 2023-12-11 RX ORDER — OLANZAPINE 15 MG/1
1 TABLET ORAL NIGHTLY
COMMUNITY
Start: 2018-06-28 | End: 2023-12-21 | Stop reason: ALTCHOICE

## 2023-12-11 RX ORDER — ACETAMINOPHEN 160 MG/5ML
650 SOLUTION ORAL EVERY 6 HOURS PRN
COMMUNITY
Start: 2017-12-13

## 2023-12-11 RX ORDER — LACTASE 9000 UNIT
2 TABLET ORAL
COMMUNITY
Start: 2018-01-18

## 2023-12-11 RX ORDER — OXCARBAZEPINE 300 MG/1
1 TABLET, FILM COATED ORAL
COMMUNITY
Start: 2018-06-28 | End: 2023-12-21 | Stop reason: ALTCHOICE

## 2023-12-13 ENCOUNTER — OFFICE VISIT (OUTPATIENT)
Dept: PSYCHOLOGY | Facility: HOSPITAL | Age: 33
End: 2023-12-13
Payer: MEDICARE

## 2023-12-13 DIAGNOSIS — R41.89 COGNITIVE IMPAIRMENT: Primary | ICD-10-CM

## 2023-12-13 DIAGNOSIS — R41.844 FRONTAL LOBE AND EXECUTIVE FUNCTION DEFICIT: ICD-10-CM

## 2023-12-13 DIAGNOSIS — Z87.820 HISTORY OF TRAUMATIC BRAIN INJURY: ICD-10-CM

## 2023-12-13 PROCEDURE — 96168 HLTH BHV IVNTJ FAM EA ADDL: CPT | Performed by: PSYCHOLOGIST

## 2023-12-13 PROCEDURE — 96158 HLTH BHV IVNTJ INDIV 1ST 30: CPT | Performed by: PSYCHOLOGIST

## 2023-12-13 PROCEDURE — 96167 HLTH BHV IVNTJ FAM 1ST 30: CPT | Performed by: PSYCHOLOGIST

## 2023-12-13 NOTE — PROGRESS NOTES
Neuropsychology Note    Name: Paige Ybarra    Date of Service: 12/13/2023     Reason For Visit: Neuropsychological Rehabilitation    Summary of Visit: Paige was seen in person today. His mother joined the call half way through. Paige will be moving to his new apartment later this month. It is right across the street from his current building, has two bedrooms instead of one, and is on the second floor so he can safely leave windows open. Paige has taken the lead in organizing the tasks of moving such as hiring movers, changing his utilities, etc. He continues to feel satisfied with his transport plan and no longer wishes to pursue driving. He is happy with his daily structure and is not thinking of finding other work, though today he said he might like to take a course somewhere. We agreed that could be considered after he has settled into his new apartment. He has fallen back on exercise (he has just resumed running), cooking new recipes (he has been using up freezer food before his move), and cleaning (his mother complained about his shower). Once his mother was on a call with us, we discussed the fact that Paige has reached his major goals of living independently, managing his own transportation, and engaging in meaningful activities. We also agreed that he does continue to require another person to review and support his engagement in activities so that he does not backslide. This would likely require about a half hour a week to review a list of activities and troubleshoot as needed. He would his mother to do this, and she tentatively agreed. We agreed they will discuss this further over the holidays and we will discuss it further in the new year.     Time: 3:02 pm - 3:58 pm    Next Session:  01/03/2024

## 2023-12-14 ENCOUNTER — APPOINTMENT (OUTPATIENT)
Dept: BEHAVIORAL HEALTH | Facility: CLINIC | Age: 33
End: 2023-12-14
Payer: MEDICARE

## 2023-12-14 ENCOUNTER — TELEMEDICINE (OUTPATIENT)
Dept: BEHAVIORAL HEALTH | Facility: CLINIC | Age: 33
End: 2023-12-14
Payer: MEDICARE

## 2023-12-14 DIAGNOSIS — R41.844 IMPAIRED EXECUTIVE FUNCTIONING: ICD-10-CM

## 2023-12-14 DIAGNOSIS — F31.9 BIPOLAR 1 DISORDER (MULTI): ICD-10-CM

## 2023-12-14 DIAGNOSIS — F90.0 ATTENTION DEFICIT HYPERACTIVITY DISORDER (ADHD), PREDOMINANTLY INATTENTIVE TYPE: ICD-10-CM

## 2023-12-14 PROCEDURE — 90834 PSYTX W PT 45 MINUTES: CPT | Performed by: PSYCHOLOGIST

## 2023-12-14 NOTE — PROGRESS NOTES
8 AM 10050 [facility] Psych for ADHD, bipolar I, executive/cognitive dysfunction (45 MIN, [phone])   Virtual. Supportive Therapy. Discussed upcoming move, Dec 20. Hired Master Movers to help. Moving to apartment across street. Discussed meetings with rehab psychologists, going to bimonthly, likes his team but accepting of reduced frequency of visits. Will continue seeing me bimonthly. Frustrated sister forgot x box. Will see all family members over holidays, plans are to go to mom's. Still talking to a few women online via [employer]. Not exercising currently, focused on move. Next: 1 month.

## 2023-12-21 ENCOUNTER — OFFICE VISIT (OUTPATIENT)
Dept: BEHAVIORAL HEALTH | Facility: CLINIC | Age: 33
End: 2023-12-21
Payer: MEDICARE

## 2023-12-21 ENCOUNTER — LAB (OUTPATIENT)
Dept: LAB | Facility: LAB | Age: 33
End: 2023-12-21
Payer: MEDICARE

## 2023-12-21 VITALS
TEMPERATURE: 98 F | DIASTOLIC BLOOD PRESSURE: 86 MMHG | HEIGHT: 69 IN | SYSTOLIC BLOOD PRESSURE: 125 MMHG | HEART RATE: 84 BPM | BODY MASS INDEX: 27.58 KG/M2 | WEIGHT: 186.2 LBS

## 2023-12-21 DIAGNOSIS — F31.9 BIPOLAR 1 DISORDER (MULTI): ICD-10-CM

## 2023-12-21 DIAGNOSIS — R41.89 COGNITIVE IMPAIRMENT: ICD-10-CM

## 2023-12-21 DIAGNOSIS — F41.9 ANXIETY: ICD-10-CM

## 2023-12-21 DIAGNOSIS — F31.70 BIPOLAR AFFECTIVE DISORDER IN REMISSION (MULTI): ICD-10-CM

## 2023-12-21 LAB
BASOPHILS # BLD AUTO: 0.05 X10*3/UL (ref 0–0.1)
BASOPHILS NFR BLD AUTO: 0.5 %
EOSINOPHIL # BLD AUTO: 0.1 X10*3/UL (ref 0–0.7)
EOSINOPHIL NFR BLD AUTO: 1.1 %
ERYTHROCYTE [DISTWIDTH] IN BLOOD BY AUTOMATED COUNT: 12.9 % (ref 11.5–14.5)
HCT VFR BLD AUTO: 42.5 % (ref 41–52)
HGB BLD-MCNC: 13.3 G/DL (ref 13.5–17.5)
IMM GRANULOCYTES # BLD AUTO: 0.02 X10*3/UL (ref 0–0.7)
IMM GRANULOCYTES NFR BLD AUTO: 0.2 % (ref 0–0.9)
LYMPHOCYTES # BLD AUTO: 1.96 X10*3/UL (ref 1.2–4.8)
LYMPHOCYTES NFR BLD AUTO: 20.7 %
MCH RBC QN AUTO: 28.9 PG (ref 26–34)
MCHC RBC AUTO-ENTMCNC: 31.3 G/DL (ref 32–36)
MCV RBC AUTO: 92 FL (ref 80–100)
MONOCYTES # BLD AUTO: 0.42 X10*3/UL (ref 0.1–1)
MONOCYTES NFR BLD AUTO: 4.4 %
NEUTROPHILS # BLD AUTO: 6.94 X10*3/UL (ref 1.2–7.7)
NEUTROPHILS NFR BLD AUTO: 73.1 %
NRBC BLD-RTO: 0 /100 WBCS (ref 0–0)
PLATELET # BLD AUTO: 251 X10*3/UL (ref 150–450)
RBC # BLD AUTO: 4.6 X10*6/UL (ref 4.5–5.9)
WBC # BLD AUTO: 9.5 X10*3/UL (ref 4.4–11.3)

## 2023-12-21 PROCEDURE — 85025 COMPLETE CBC W/AUTO DIFF WBC: CPT

## 2023-12-21 PROCEDURE — 99213 OFFICE O/P EST LOW 20 MIN: CPT | Performed by: PSYCHIATRY & NEUROLOGY

## 2023-12-21 PROCEDURE — 3074F SYST BP LT 130 MM HG: CPT | Performed by: PSYCHIATRY & NEUROLOGY

## 2023-12-21 PROCEDURE — 3079F DIAST BP 80-89 MM HG: CPT | Performed by: PSYCHIATRY & NEUROLOGY

## 2023-12-21 PROCEDURE — 36415 COLL VENOUS BLD VENIPUNCTURE: CPT

## 2023-12-21 ASSESSMENT — PAIN SCALES - GENERAL: PAINLEVEL: 0-NO PAIN

## 2023-12-21 NOTE — PATIENT INSTRUCTIONS
Plan:   - Continue current medications:   - Buspirone 5mg in the morning and 10mg (two 5mg tablets) in the evening    Clozapine 225mg (200mg +25mg) in AM, 425mg (two 200mg + 25mg) at night    Lithium 450mg in morning, 600mg in evening    Gabapentin 400mg tid.     Folic acid 1mg daily.   - Continue monthly blood draws.   - Continue to monitor for any change in mood, tremors or any new symptoms.   - Follow up with neurologist on a regular basis for management of seizures.   - Continue psychotherapy and cognitive rehabilitation therapy.   - Regular physical exercise.   - Recommend heart-healthy, low-fat, low-cholesterol diet.   - Follow up in about 2-3 months.   - Call sooner ([phone]) in case of any questions or concerns.
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
## 2023-12-27 ENCOUNTER — OFFICE VISIT (OUTPATIENT)
Dept: PSYCHOLOGY | Facility: HOSPITAL | Age: 33
End: 2023-12-27
Payer: MEDICARE

## 2023-12-27 DIAGNOSIS — R41.89 COGNITIVE IMPAIRMENT: ICD-10-CM

## 2023-12-27 DIAGNOSIS — R41.840 ATTENTION AND CONCENTRATION DEFICIT: ICD-10-CM

## 2023-12-27 DIAGNOSIS — Z87.820 HISTORY OF TRAUMATIC BRAIN INJURY: ICD-10-CM

## 2023-12-27 DIAGNOSIS — R41.3 MEMORY LOSS: Primary | ICD-10-CM

## 2023-12-27 PROCEDURE — 96168 HLTH BHV IVNTJ FAM EA ADDL: CPT | Mod: AH

## 2023-12-27 PROCEDURE — 96167 HLTH BHV IVNTJ FAM 1ST 30: CPT | Mod: AH

## 2023-12-27 PROCEDURE — 96167 HLTH BHV IVNTJ FAM 1ST 30: CPT

## 2023-12-27 PROCEDURE — 96168 HLTH BHV IVNTJ FAM EA ADDL: CPT

## 2023-12-27 NOTE — PROGRESS NOTES
Name: Paige Ybarra   MRN: [id number]   Age: 33 y.o.   Date of Service: 12/27/2023    Chief Complaint:   Paige is being seen for neuropsychological rehabilitation for cognitive and emotional problems due to traumatic brain injury sustained in the context of bipolar disorder. He presented to the appointment with his mother.     Session Details:   The topics of the session included completing a check-in, reviewing home practice, and transitioning his accountability to CogSMART strategies to family. During the check-in, he reported celebrating x-mas and moving to a new apartment. The home practice was completed to create a cognitive toolbox, and he introduced the toolbox to his mother. The majority of the session focused on discussing Paige's toolbox and how family can support Paige using the tools he has learned to solve problems. His mother was encouraged to discuss a problem with Paige with the implementation of tools so feedback could be provided. They discussed Paige needing to set up a schedule for cleaning. He generated tasks and identified compensatory strategies (chunk based on room, calendars, alarms, and pacing) to help him with this task. His mother was also provided feedback about how best to assist Paige, such as giving him time to generate ideas to solve a problem and encouraging cognitive flexibility.     Behavioral Observations   General Appearance: Well groomed, appropriate eye contact  Attitude/Behavior: Cooperative  Motor: No psychomotor agitation or retardation, no tremor or other abnormal movements  Speech: Normal rate, volume, prosody  Gait/Station: WFL - Within functional limits  Affect: Constricted, Congruent with mood and topic of conversation  Thought Process: Linear, goal directed  Thought Associations: No loosening of associations  Thought Content: Normal  Perception: No perceptual abnormalities noted  Sensorium: Drowsy  Insight: Fair  Judgement: Fair    Diagnosis:   1. Memory loss     2. Attention and concentration deficit    3. Cognitive impairment    4. History of traumatic brain injury      Plan:   The next in-person session will be scheduled on 1/24 at 3PM. For home practice, he will finalize a list of cleaning tasks and organize them. For bonus, he will try to create a cleaning schedule. The plan for the next session is to complete a check-in, review home practice, and discuss methods to use CogSMART strategies in everyday life.     Time:   9749-6686

## 2024-01-03 ENCOUNTER — OFFICE VISIT (OUTPATIENT)
Dept: PSYCHOLOGY | Facility: HOSPITAL | Age: 34
End: 2024-01-03
Payer: MEDICARE

## 2024-01-03 DIAGNOSIS — Z87.820 HISTORY OF TRAUMATIC BRAIN INJURY: ICD-10-CM

## 2024-01-03 DIAGNOSIS — R41.844 FRONTAL LOBE AND EXECUTIVE FUNCTION DEFICIT: ICD-10-CM

## 2024-01-03 DIAGNOSIS — R41.89 COGNITIVE IMPAIRMENT: Primary | ICD-10-CM

## 2024-01-03 PROCEDURE — 96168 HLTH BHV IVNTJ FAM EA ADDL: CPT | Performed by: PSYCHOLOGIST

## 2024-01-03 PROCEDURE — 96167 HLTH BHV IVNTJ FAM 1ST 30: CPT | Performed by: PSYCHOLOGIST

## 2024-01-03 NOTE — PROGRESS NOTES
Neuropsychology Note    Name: Paige Ybarra    Date of Service: 01/03/2024     Reason For Visit: Neuropsychological Rehabilitation    Summary of Visit: Paige was seen together with his mother today. We are launching the next phase of Paige's rehabilitation. He will be reducing his appointments with me to once a month, and his mother will take on weekly reviews with him. We agreed that he will create a grid with days across the top and items to be reviewed down the side, and Paige will fill the items in for each day or week, as indicated. The items will include job attendance (with a space for each of his three jobs), exercise (with a description of what he did and details), music (he agrees to practice his ukulele for 10 minutes each day), reading (10 minutes a day), cooking (one new recipe a week; no sandwiches for dinner), cleaning, and videogaming. We also agreed he will get a credit card to help him establish a credit rating. He was also proud to say that he got his own [employer] card.   We also reviewed tips for supporting Paige to develop confidence in his abilities, such as letting him think for a moment before telling him an answer and asking him to stop and think something through himself. We all agreed that Paige has made tremendous progress and is ready for yet another level of independence.    Time: 3:01 pm - 4:00 pm    Next Session:  01/31/2024

## 2024-01-11 ENCOUNTER — TELEMEDICINE (OUTPATIENT)
Dept: BEHAVIORAL HEALTH | Facility: CLINIC | Age: 34
End: 2024-01-11
Payer: MEDICARE

## 2024-01-11 DIAGNOSIS — R41.89 COGNITIVE IMPAIRMENT: ICD-10-CM

## 2024-01-11 DIAGNOSIS — F31.9 BIPOLAR 1 DISORDER (MULTI): ICD-10-CM

## 2024-01-11 DIAGNOSIS — F90.0 ATTENTION DEFICIT HYPERACTIVITY DISORDER (ADHD), PREDOMINANTLY INATTENTIVE TYPE: ICD-10-CM

## 2024-01-11 PROCEDURE — 90834 PSYTX W PT 45 MINUTES: CPT | Performed by: PSYCHOLOGIST

## 2024-01-11 NOTE — PROGRESS NOTES
8 AM 37318 Indiv Psych for ADHD, bipolar I, executive/cognitive dysfunction (45 MIN, [phone])   Virtual. Supportive Therapy. Moved to new apartment [place] in [place] [facility] from 1 to a 2 bedroom. Liking so far. Is settled in. Nice holiday season with mom and family. Back to [employer] [facility] next Tues when classes begin. Hopes to get more consistent with exercise at rec center there (perk of volunteering). Still getting to [employer]. Reading Menard [employer] biography. Talking to a woman on [employer], early into process. Next: 2 weeks.

## 2024-01-16 DIAGNOSIS — F31.9 BIPOLAR 1 DISORDER (MULTI): ICD-10-CM

## 2024-01-16 DIAGNOSIS — F41.9 ANXIETY: ICD-10-CM

## 2024-01-16 RX ORDER — BUSPIRONE HYDROCHLORIDE 5 MG/1
TABLET ORAL
Qty: 270 TABLET | Refills: 1 | Status: SHIPPED | OUTPATIENT
Start: 2024-01-16

## 2024-01-16 RX ORDER — FOLIC ACID 1 MG/1
1 TABLET ORAL DAILY
Qty: 90 TABLET | Refills: 1 | Status: SHIPPED | OUTPATIENT
Start: 2024-01-16

## 2024-01-24 ENCOUNTER — PROCEDURE VISIT (OUTPATIENT)
Dept: PSYCHOLOGY | Facility: CLINIC | Age: 34
End: 2024-01-24
Payer: MEDICARE

## 2024-01-24 DIAGNOSIS — R41.840 ATTENTION AND CONCENTRATION DEFICIT: ICD-10-CM

## 2024-01-24 DIAGNOSIS — Z87.820 HISTORY OF TRAUMATIC BRAIN INJURY: ICD-10-CM

## 2024-01-24 DIAGNOSIS — R41.844 FRONTAL LOBE AND EXECUTIVE FUNCTION DEFICIT: Primary | ICD-10-CM

## 2024-01-24 DIAGNOSIS — R41.3 MEMORY LOSS: ICD-10-CM

## 2024-01-24 PROCEDURE — 96158 HLTH BHV IVNTJ INDIV 1ST 30: CPT

## 2024-01-24 PROCEDURE — 96159 HLTH BHV IVNTJ INDIV EA ADDL: CPT | Mod: AH

## 2024-01-24 PROCEDURE — 96159 HLTH BHV IVNTJ INDIV EA ADDL: CPT

## 2024-01-24 NOTE — PROGRESS NOTES
Name: Paige Ybarra   MRN: [id number]   Age: 33 y.o.   Date of Service: 1/24/2024    Chief Complaint:   Paige is being seen for neuropsychological rehabilitation for cognitive and emotional problems due to traumatic brain injury sustained in the context of bipolar disorder. He presented to the appointment with his mother.     Session Details:   The topics of the session included completing a check-in, reviewing home practice, and transitioning his accountability of CogSMART strategies to family. During the check-in, he indicated losing his notebook in the move. He has another notebook that he will use. The home practice was completed to finalize a list of cleaning tasks, organize them, and create cleaning schedule. We discussed more effective ways to organize them, such as chunking them by task to reduce overwhelm. He identified two chunks (e.g., taking out trash and cleaning floors), and he was encouraged to think of no more than 5 more chunks so he can do a chunk on each day. We also discussed him choosing to do numerous activities, where he was encouraged to determine the most important ones that need to be completed each day (e.g., volunteer).      Behavioral Observations   He arrived early and accompanied to the appointment with his mother.     General Appearance: Well groomed, appropriate eye contact  Attitude/Behavior: Cooperative  Motor: No psychomotor agitation or retardation, no tremor or other abnormal movements  Speech: Normal rate, volume, prosody  Gait/Station: WFL - Within functional limits  Affect: Euthymic, full-range  Thought Process: Linear, goal directed  Thought Associations: No loosening of associations  Thought Content: Normal  Perception: No perceptual abnormalities noted  Sensorium: Alert  Insight: Fair  Judgement: Fair    Diagnosis:   1. Frontal lobe and executive function deficit    2. Memory loss    3. Attention and concentration deficit    4. History of traumatic brain injury       Plan:   The next in-person session will be scheduled on 2/28 at 3PM. For home practice, he will execute cleaning plan (floors on Monday and trash on Friday), create chunks on cleaning days, and prioritize value based tasks. He will complete the home practice by 1/31 and email it to me. The plan for the next session is to complete a check-in, review home practice, and discuss methods to use CogSMART strategies in everyday life.     Time:   0365-9287

## 2024-01-25 ENCOUNTER — TELEMEDICINE (OUTPATIENT)
Dept: BEHAVIORAL HEALTH | Facility: CLINIC | Age: 34
End: 2024-01-25
Payer: MEDICARE

## 2024-01-25 DIAGNOSIS — F90.0 ATTENTION DEFICIT HYPERACTIVITY DISORDER (ADHD), PREDOMINANTLY INATTENTIVE TYPE: ICD-10-CM

## 2024-01-25 DIAGNOSIS — R41.89 COGNITIVE IMPAIRMENT: ICD-10-CM

## 2024-01-25 DIAGNOSIS — F31.9 BIPOLAR 1 DISORDER (MULTI): ICD-10-CM

## 2024-01-25 PROCEDURE — 90834 PSYTX W PT 45 MINUTES: CPT | Performed by: PSYCHOLOGIST

## 2024-01-25 NOTE — PROGRESS NOTES
"8 AM 04039 Indiv Psych for ADHD, bipolar I, executive/cognitive dysfunction (45 MIN, [phone])   Virtual. Supportive Therapy. Continues to meet with rehab psychologists. Discussed his online dating on [employer], recently a woman \"removed profile\", taking it personally. Discussed alternate ways to view (e.g., nothing to do with him, etc). Still talking to womanDarlin. Disappointed about dating. Discussed dating site for persons with disabilities, not interested currently, said he knows it sounds \"shallow\". Ran one time in past week, back at [facility], still reading, took X-mas stuff down, tending to his plants. \"Would like to find a girlfriend.\" Still getting to [employer]. Next: 2 weeks.   "

## 2024-01-30 ENCOUNTER — LAB (OUTPATIENT)
Dept: LAB | Facility: LAB | Age: 34
End: 2024-01-30
Payer: MEDICARE

## 2024-01-30 DIAGNOSIS — F31.70 BIPOLAR AFFECTIVE DISORDER IN REMISSION (MULTI): ICD-10-CM

## 2024-01-30 LAB
BASOPHILS # BLD AUTO: 0.04 X10*3/UL (ref 0–0.1)
BASOPHILS NFR BLD AUTO: 0.5 %
EOSINOPHIL # BLD AUTO: 0.14 X10*3/UL (ref 0–0.7)
EOSINOPHIL NFR BLD AUTO: 1.6 %
ERYTHROCYTE [DISTWIDTH] IN BLOOD BY AUTOMATED COUNT: 13.7 % (ref 11.5–14.5)
HCT VFR BLD AUTO: 38.2 % (ref 41–52)
HGB BLD-MCNC: 12.2 G/DL (ref 13.5–17.5)
IMM GRANULOCYTES # BLD AUTO: 0.02 X10*3/UL (ref 0–0.7)
IMM GRANULOCYTES NFR BLD AUTO: 0.2 % (ref 0–0.9)
LYMPHOCYTES # BLD AUTO: 2.27 X10*3/UL (ref 1.2–4.8)
LYMPHOCYTES NFR BLD AUTO: 26.2 %
MCH RBC QN AUTO: 28.8 PG (ref 26–34)
MCHC RBC AUTO-ENTMCNC: 31.9 G/DL (ref 32–36)
MCV RBC AUTO: 90 FL (ref 80–100)
MONOCYTES # BLD AUTO: 0.41 X10*3/UL (ref 0.1–1)
MONOCYTES NFR BLD AUTO: 4.7 %
NEUTROPHILS # BLD AUTO: 5.79 X10*3/UL (ref 1.2–7.7)
NEUTROPHILS NFR BLD AUTO: 66.8 %
NRBC BLD-RTO: 0 /100 WBCS (ref 0–0)
PLATELET # BLD AUTO: 248 X10*3/UL (ref 150–450)
RBC # BLD AUTO: 4.23 X10*6/UL (ref 4.5–5.9)
WBC # BLD AUTO: 8.7 X10*3/UL (ref 4.4–11.3)

## 2024-01-30 PROCEDURE — 85025 COMPLETE CBC W/AUTO DIFF WBC: CPT

## 2024-01-30 PROCEDURE — 36415 COLL VENOUS BLD VENIPUNCTURE: CPT

## 2024-01-31 ENCOUNTER — OFFICE VISIT (OUTPATIENT)
Dept: PSYCHOLOGY | Facility: HOSPITAL | Age: 34
End: 2024-01-31
Payer: MEDICARE

## 2024-01-31 DIAGNOSIS — R41.3 MEMORY LOSS: ICD-10-CM

## 2024-01-31 DIAGNOSIS — R41.89 COGNITIVE IMPAIRMENT: ICD-10-CM

## 2024-01-31 DIAGNOSIS — R41.844 FRONTAL LOBE AND EXECUTIVE FUNCTION DEFICIT: Primary | ICD-10-CM

## 2024-01-31 DIAGNOSIS — Z87.820 HISTORY OF TRAUMATIC BRAIN INJURY: ICD-10-CM

## 2024-01-31 PROCEDURE — 96159 HLTH BHV IVNTJ INDIV EA ADDL: CPT | Performed by: PSYCHOLOGIST

## 2024-01-31 PROCEDURE — 96158 HLTH BHV IVNTJ INDIV 1ST 30: CPT | Performed by: PSYCHOLOGIST

## 2024-01-31 NOTE — PROGRESS NOTES
Neuropsychology Note    Name: Paige Ybarra    Date of Service: 01/31/2024     Reason For Visit: Neuropsychological Rehabilitation    Summary of Visit: Paige reported that he is communicating with someone on a dating savage, and this continues to be a very important aspect of his life. He is also continuing to be interested in plants, and he is attending a planting event at [employer] later today.   We reviewed the activities we have agreed on that reflect a diverse and active life, as follows:   - Exercise: he has not been exercising regularly as planned, though he has been doing some yoga instead of going to the gym. We discussed how to assure he also goes to the gym, and he agreed to go when he is already at [employer] for work.    - Reading: He is completing his ten minutes a day for the most part, though lately he has been counting word games as reading. He did acknowledge that this was not correct and agreed to read his news feeds fully. He also continues with his .   - Music: Paige has not been practicing for his hhgregg lessons as planned, saying he just has not felt like it. We agreed he will put active reminders in [employer] and will use this savage as a way to document his activities.    - Cooking: Paige has not been cooking new meals, nor has invited a friend or his mom for dinner.   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   - Cleaning: Paige reported that he is keeping up with cleaning.    We reflected that although Paige has the skills to perform all of the activities above, he still struggles to initiate and maintain them without significant prompting. We agreed to invite his mother to our next session, as she will gradually be taking over the prompting from me.    Time: 3:01 pm - 3:58 pm    Next Session:  03/06/2024

## 2024-02-08 ENCOUNTER — TELEMEDICINE (OUTPATIENT)
Dept: BEHAVIORAL HEALTH | Facility: CLINIC | Age: 34
End: 2024-02-08
Payer: MEDICARE

## 2024-02-08 DIAGNOSIS — F90.0 ATTENTION DEFICIT HYPERACTIVITY DISORDER (ADHD), PREDOMINANTLY INATTENTIVE TYPE: ICD-10-CM

## 2024-02-08 DIAGNOSIS — R41.89 COGNITIVE IMPAIRMENT: ICD-10-CM

## 2024-02-08 DIAGNOSIS — F31.9 BIPOLAR 1 DISORDER (MULTI): ICD-10-CM

## 2024-02-08 PROCEDURE — 90834 PSYTX W PT 45 MINUTES: CPT | Performed by: PSYCHOLOGIST

## 2024-02-08 NOTE — PROGRESS NOTES
"8 AM 45240 Indiv Psych for ADHD, bipolar I, executive/cognitive dysfunction (45 MIN, [phone])  Virtual. Supportive Therapy. \"Bored.\" \"I'd like to go back to work\" but understands it would like impact disability money which he doesn't want to lose. Encouraged him to ask more about money he has coming in from social security and [employer]. Unclear if long-term disability monies from [employer] are time-limited. Also, unclear if he can earn any amount of money without being penalized financially. Encouraged him to discuss with mom who's his financial guardian. Talking to woman, Darlin, on [employer]. Noted they've had a phone conversation, apparently she has spina bifida. He can't remember all he talks about. Encouraged a long of conversations to take notes and review upon next talk. \"I would like a significant other.\" \"Life not quite the way I want it to be.\" Discussed driving evaluation at [facility] but not interested in currently. Focuses on how much it would cost to get car and auto insurance. Indicated to him that that's several steps down the road because it's not even sure if he would pass safety pass. Next: 2 weeks.     "
yes

## 2024-02-22 ENCOUNTER — TELEMEDICINE (OUTPATIENT)
Dept: BEHAVIORAL HEALTH | Facility: CLINIC | Age: 34
End: 2024-02-22
Payer: MEDICARE

## 2024-02-22 ENCOUNTER — LAB (OUTPATIENT)
Dept: LAB | Facility: LAB | Age: 34
End: 2024-02-22
Payer: MEDICARE

## 2024-02-22 DIAGNOSIS — F31.9 BIPOLAR 1 DISORDER (MULTI): ICD-10-CM

## 2024-02-22 DIAGNOSIS — F31.70 BIPOLAR AFFECTIVE DISORDER IN REMISSION (MULTI): ICD-10-CM

## 2024-02-22 DIAGNOSIS — F90.0 ATTENTION DEFICIT HYPERACTIVITY DISORDER (ADHD), PREDOMINANTLY INATTENTIVE TYPE: ICD-10-CM

## 2024-02-22 DIAGNOSIS — R41.89 COGNITIVE IMPAIRMENT: ICD-10-CM

## 2024-02-22 LAB
BASOPHILS # BLD AUTO: 0.04 X10*3/UL (ref 0–0.1)
BASOPHILS NFR BLD AUTO: 0.4 %
EOSINOPHIL # BLD AUTO: 0.12 X10*3/UL (ref 0–0.7)
EOSINOPHIL NFR BLD AUTO: 1.1 %
ERYTHROCYTE [DISTWIDTH] IN BLOOD BY AUTOMATED COUNT: 13.9 % (ref 11.5–14.5)
HCT VFR BLD AUTO: 41.3 % (ref 41–52)
HGB BLD-MCNC: 12.6 G/DL (ref 13.5–17.5)
IMM GRANULOCYTES # BLD AUTO: 0.03 X10*3/UL (ref 0–0.7)
IMM GRANULOCYTES NFR BLD AUTO: 0.3 % (ref 0–0.9)
LYMPHOCYTES # BLD AUTO: 1.84 X10*3/UL (ref 1.2–4.8)
LYMPHOCYTES NFR BLD AUTO: 17.3 %
MCH RBC QN AUTO: 27.6 PG (ref 26–34)
MCHC RBC AUTO-ENTMCNC: 30.5 G/DL (ref 32–36)
MCV RBC AUTO: 90 FL (ref 80–100)
MONOCYTES # BLD AUTO: 0.5 X10*3/UL (ref 0.1–1)
MONOCYTES NFR BLD AUTO: 4.7 %
NEUTROPHILS # BLD AUTO: 8.1 X10*3/UL (ref 1.2–7.7)
NEUTROPHILS NFR BLD AUTO: 76.2 %
NRBC BLD-RTO: 0 /100 WBCS (ref 0–0)
PLATELET # BLD AUTO: 247 X10*3/UL (ref 150–450)
RBC # BLD AUTO: 4.57 X10*6/UL (ref 4.5–5.9)
WBC # BLD AUTO: 10.6 X10*3/UL (ref 4.4–11.3)

## 2024-02-22 PROCEDURE — 85025 COMPLETE CBC W/AUTO DIFF WBC: CPT

## 2024-02-22 PROCEDURE — 90834 PSYTX W PT 45 MINUTES: CPT | Performed by: PSYCHOLOGIST

## 2024-02-22 PROCEDURE — 36415 COLL VENOUS BLD VENIPUNCTURE: CPT

## 2024-02-22 NOTE — PROGRESS NOTES
8 AM 02788 Ind Psych for ADHD, bipolar I, executive/cognitive dysfunction (45 MIN, [phone])   Virtual. Supportive Therapy. Woman interested on [employer], met someone organically, no longer interested. Disappointed but managing. Continues to get to [facility], [employer], [employer], exercising outside and at [facility]. Mother and partner came over to his apartment and brought dinner. May participate in [employer], handling rejection. Looking forward to [employer] starting. Next: 2 weeks.

## 2024-02-28 ENCOUNTER — OFFICE VISIT (OUTPATIENT)
Dept: PSYCHOLOGY | Facility: CLINIC | Age: 34
End: 2024-02-28
Payer: MEDICARE

## 2024-02-28 DIAGNOSIS — R41.3 MEMORY LOSS: ICD-10-CM

## 2024-02-28 DIAGNOSIS — R41.840 ATTENTION AND CONCENTRATION DEFICIT: ICD-10-CM

## 2024-02-28 DIAGNOSIS — Z87.820 HISTORY OF TRAUMATIC BRAIN INJURY: ICD-10-CM

## 2024-02-28 DIAGNOSIS — R41.844 FRONTAL LOBE AND EXECUTIVE FUNCTION DEFICIT: Primary | ICD-10-CM

## 2024-02-28 PROCEDURE — 96167 HLTH BHV IVNTJ FAM 1ST 30: CPT

## 2024-02-28 PROCEDURE — 96168 HLTH BHV IVNTJ FAM EA ADDL: CPT | Mod: AH

## 2024-02-28 PROCEDURE — 96168 HLTH BHV IVNTJ FAM EA ADDL: CPT

## 2024-02-28 PROCEDURE — 96167 HLTH BHV IVNTJ FAM 1ST 30: CPT | Mod: AH

## 2024-02-28 NOTE — PROGRESS NOTES
Name: Paige Ybarra   MRN: [id number]   Age: 33 y.o.   Date of Service: 2/28/2024    Chief Complaint:   Paige is being seen for neuropsychological rehabilitation for cognitive and emotional problems due to traumatic brain injury sustained in the context of bipolar disorder.He provided verbal consent for the resident to observe.    Session Details:   The topics of the session included completing a check-in, reviewing home practice, and continuing to discuss transitioning accountability to his mother. During the check-in, the patient having a poor day; we processed it. The home practice was completed to execute cleaning plan, create chunks on cleaning days, and prioritize value based tasks. The remainder of the session focused on transitioning accountably to cognitive strategies to his mother. He demonstrated some resistance due to decreased willingness to implement strategies, and we discussed ways to overcome it by harnessing strengths and having a safe word (Banana) with his mother. The plan is to use [employer] to implement cleaning plan.      Behavioral Observations   He arrived early and accompanied to the appointment with his mother.   General Appearance: Well groomed, appropriate eye contact  Attitude/Behavior: Difficult to engage  Motor: No psychomotor agitation or retardation, no tremor or other abnormal movements  Speech: Normal rate, volume, prosody  Gait/Station: WFL - Within functional limits  Affect: Flat  Thought Process: Linear, goal directed  Thought Associations: No loosening of associations  Thought Content: Normal  Perception: No perceptual abnormalities noted  Sensorium: Alert  Insight: Fair  Judgement: Fair    Diagnosis:   1. Frontal lobe and executive function deficit    2. Memory loss    3. Attention and concentration deficit    4. History of traumatic brain injury      Plan:   The next in-person session will be scheduled on 04/24 at 3PM. For home practice, he will use [employer] to remind him to  clean, use safe word with mom, and create meal in air fryer (bonus). The plan for the next session is to complete a check-in, review home practice, and discuss methods to use CogSMART strategies in everyday life.     Time:   1020-9200

## 2024-03-06 ENCOUNTER — OFFICE VISIT (OUTPATIENT)
Dept: PSYCHOLOGY | Facility: HOSPITAL | Age: 34
End: 2024-03-06
Payer: MEDICARE

## 2024-03-06 DIAGNOSIS — Z87.820 HISTORY OF TRAUMATIC BRAIN INJURY: ICD-10-CM

## 2024-03-06 DIAGNOSIS — R41.89 COGNITIVE IMPAIRMENT: ICD-10-CM

## 2024-03-06 DIAGNOSIS — R41.3 MEMORY LOSS: ICD-10-CM

## 2024-03-06 DIAGNOSIS — R41.844 FRONTAL LOBE AND EXECUTIVE FUNCTION DEFICIT: Primary | ICD-10-CM

## 2024-03-06 PROCEDURE — 96167 HLTH BHV IVNTJ FAM 1ST 30: CPT | Performed by: PSYCHOLOGIST

## 2024-03-06 PROCEDURE — 96158 HLTH BHV IVNTJ INDIV 1ST 30: CPT | Performed by: PSYCHOLOGIST

## 2024-03-06 NOTE — PROGRESS NOTES
Neuropsychology Note    Name: Paige Ybarra    Date of Service: 03/06/2024     Reason For Visit: Neuropsychological Rehabilitation    Summary of Visit: Paige reported that overall he is doing well. He wanted to discuss his dating efforts, but we opted to set those aside for his session with Dr. Kumar. Instead, we focused on maintenance of his daily routine with the goal being successful independent living. His mother joined partway through. Paige decided to use [employer] as his primary place to store his daily schedule and activities. He will program in his list of daily to do items, modifying it for each day of the week. These key items include exercise, reading for his , reading for himself, practicing ukelele, housecleaning, and cooking one new dish from scratch each week. He will look at [employer] in the morning to review and prepare for his day. He will set reminders in [employer] for each item, and he will tick items off as he completes them. We evaluated his progress and agreed he is doing well with everything except exercise, cooking, and some cleaning. He can leave out some items previously in [employer] because they are now an automatic part of his day.    Time: 3:01 pm - 3:59 pm    Next Session:  04/03/2024

## 2024-03-07 ENCOUNTER — TELEMEDICINE (OUTPATIENT)
Dept: BEHAVIORAL HEALTH | Facility: CLINIC | Age: 34
End: 2024-03-07
Payer: MEDICARE

## 2024-03-07 DIAGNOSIS — R41.89 COGNITIVE IMPAIRMENT: ICD-10-CM

## 2024-03-07 DIAGNOSIS — F31.9 BIPOLAR 1 DISORDER (MULTI): ICD-10-CM

## 2024-03-07 DIAGNOSIS — F90.0 ATTENTION DEFICIT HYPERACTIVITY DISORDER (ADHD), PREDOMINANTLY INATTENTIVE TYPE: ICD-10-CM

## 2024-03-07 PROCEDURE — 90834 PSYTX W PT 45 MINUTES: CPT | Performed by: PSYCHOLOGIST

## 2024-03-07 NOTE — PROGRESS NOTES
8 AM 95075 Ind Psych for ADHD, bipolar I, executive/cognitive dysfunction (45 MIN, [phone])   Virtual. Supportive Therapy. Discussed meeting with rehab psychologist, he mentioned goal of exercising more (only two days running in past few weeks) and set as a goal to cook one new meal per week. Talked to woman at [employer], trying to make plans for art museum. Also, somewhat of a shift, now considering dating sites for individuals with disabilities, mentioned to worker at [employer], Charles, now more open to this. Having some difficulty getting/registering on site. Looking forward to baseball season with [employer]. Family vacation in [place] this July. Otherwise, same routine, [employer], [employer], off this week from [facility] b/c of spring break. Next: 1 month.

## 2024-03-15 ENCOUNTER — LAB (OUTPATIENT)
Dept: LAB | Facility: LAB | Age: 34
End: 2024-03-15
Payer: MEDICARE

## 2024-03-15 DIAGNOSIS — F31.70 BIPOLAR AFFECTIVE DISORDER IN REMISSION (MULTI): ICD-10-CM

## 2024-03-15 LAB
BASOPHILS # BLD AUTO: 0.05 X10*3/UL (ref 0–0.1)
BASOPHILS NFR BLD AUTO: 0.5 %
EOSINOPHIL # BLD AUTO: 0.22 X10*3/UL (ref 0–0.7)
EOSINOPHIL NFR BLD AUTO: 2 %
ERYTHROCYTE [DISTWIDTH] IN BLOOD BY AUTOMATED COUNT: 14 % (ref 11.5–14.5)
HCT VFR BLD AUTO: 37.9 % (ref 41–52)
HGB BLD-MCNC: 11.5 G/DL (ref 13.5–17.5)
IMM GRANULOCYTES # BLD AUTO: 0.04 X10*3/UL (ref 0–0.7)
IMM GRANULOCYTES NFR BLD AUTO: 0.4 % (ref 0–0.9)
LYMPHOCYTES # BLD AUTO: 2.05 X10*3/UL (ref 1.2–4.8)
LYMPHOCYTES NFR BLD AUTO: 18.6 %
MCH RBC QN AUTO: 28.2 PG (ref 26–34)
MCHC RBC AUTO-ENTMCNC: 30.3 G/DL (ref 32–36)
MCV RBC AUTO: 93 FL (ref 80–100)
MONOCYTES # BLD AUTO: 0.4 X10*3/UL (ref 0.1–1)
MONOCYTES NFR BLD AUTO: 3.6 %
NEUTROPHILS # BLD AUTO: 8.26 X10*3/UL (ref 1.2–7.7)
NEUTROPHILS NFR BLD AUTO: 74.9 %
NRBC BLD-RTO: 0 /100 WBCS (ref 0–0)
PLATELET # BLD AUTO: 307 X10*3/UL (ref 150–450)
RBC # BLD AUTO: 4.08 X10*6/UL (ref 4.5–5.9)
WBC # BLD AUTO: 11 X10*3/UL (ref 4.4–11.3)

## 2024-03-15 PROCEDURE — 85025 COMPLETE CBC W/AUTO DIFF WBC: CPT

## 2024-03-15 PROCEDURE — 36415 COLL VENOUS BLD VENIPUNCTURE: CPT

## 2024-03-21 ENCOUNTER — OFFICE VISIT (OUTPATIENT)
Dept: BEHAVIORAL HEALTH | Facility: CLINIC | Age: 34
End: 2024-03-21
Payer: MEDICARE

## 2024-03-21 VITALS
WEIGHT: 193 LBS | HEART RATE: 122 BPM | DIASTOLIC BLOOD PRESSURE: 88 MMHG | HEIGHT: 69 IN | BODY MASS INDEX: 28.58 KG/M2 | TEMPERATURE: 97.6 F | SYSTOLIC BLOOD PRESSURE: 131 MMHG

## 2024-03-21 DIAGNOSIS — E78.49 OTHER HYPERLIPIDEMIA: ICD-10-CM

## 2024-03-21 DIAGNOSIS — F41.9 ANXIETY: ICD-10-CM

## 2024-03-21 DIAGNOSIS — F31.9 BIPOLAR 1 DISORDER (MULTI): ICD-10-CM

## 2024-03-21 DIAGNOSIS — R41.844 EXECUTIVE FUNCTION DEFICIT: ICD-10-CM

## 2024-03-21 DIAGNOSIS — R41.89 COGNITIVE IMPAIRMENT: ICD-10-CM

## 2024-03-21 PROCEDURE — 3075F SYST BP GE 130 - 139MM HG: CPT | Performed by: PSYCHIATRY & NEUROLOGY

## 2024-03-21 PROCEDURE — 1036F TOBACCO NON-USER: CPT | Performed by: PSYCHIATRY & NEUROLOGY

## 2024-03-21 PROCEDURE — 99213 OFFICE O/P EST LOW 20 MIN: CPT | Performed by: PSYCHIATRY & NEUROLOGY

## 2024-03-21 PROCEDURE — 3079F DIAST BP 80-89 MM HG: CPT | Performed by: PSYCHIATRY & NEUROLOGY

## 2024-03-21 ASSESSMENT — PAIN SCALES - GENERAL: PAINLEVEL: 0-NO PAIN

## 2024-03-21 NOTE — PATIENT INSTRUCTIONS
- Please get additional blood tests with your next lab blood draw. This has to be before taking morning dose of lithium and after fasting for 12 hours overnight.   - Continue current medications:   Buspirone to 10mg in the evening, continue 5mg in the morning.   Clozapine 225mg (200mg +25mg) in AM, 425mg (two 200mg + 25mg) at night  Lithium 450mg in the morning; 600mg in the evening.   Gabapentin 400mg tid.   Folic acid 1mg daily.  - OK to take melatonin 5 to 10mg as needed for sleep.   - Monthly blood draws for CBC for clozapine to monitor for agranulocytosis.   - Continue cognitive rehab and psychotherapy.   - Provided supportive therapy.   - Discussed healthy lifestyle - encouraged low-fat, low-cholesterol diet and regular exercise in order to reduce LDL and total cholesterol.   - Follow up regularly with other providers.  - Follow up in 3 months.   - Call sooner if needed.
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
## 2024-03-26 DIAGNOSIS — F31.70 BIPOLAR AFFECTIVE DISORDER IN REMISSION (MULTI): ICD-10-CM

## 2024-03-26 RX ORDER — GABAPENTIN 400 MG/1
400 CAPSULE ORAL 3 TIMES DAILY
Qty: 90 CAPSULE | Refills: 2 | Status: SHIPPED | OUTPATIENT
Start: 2024-03-26 | End: 2024-06-24

## 2024-04-03 ENCOUNTER — OFFICE VISIT (OUTPATIENT)
Dept: PSYCHOLOGY | Facility: HOSPITAL | Age: 34
End: 2024-04-03
Payer: MEDICARE

## 2024-04-03 DIAGNOSIS — R41.89 COGNITIVE IMPAIRMENT: ICD-10-CM

## 2024-04-03 DIAGNOSIS — Z87.820 HISTORY OF TRAUMATIC BRAIN INJURY: ICD-10-CM

## 2024-04-03 DIAGNOSIS — R41.3 MEMORY LOSS: ICD-10-CM

## 2024-04-03 DIAGNOSIS — R41.844 FRONTAL LOBE AND EXECUTIVE FUNCTION DEFICIT: Primary | ICD-10-CM

## 2024-04-03 PROCEDURE — 96167 HLTH BHV IVNTJ FAM 1ST 30: CPT | Performed by: PSYCHOLOGIST

## 2024-04-03 PROCEDURE — 96168 HLTH BHV IVNTJ FAM EA ADDL: CPT | Performed by: PSYCHOLOGIST

## 2024-04-04 ENCOUNTER — TELEMEDICINE (OUTPATIENT)
Dept: BEHAVIORAL HEALTH | Facility: CLINIC | Age: 34
End: 2024-04-04
Payer: MEDICARE

## 2024-04-04 DIAGNOSIS — F90.0 ATTENTION DEFICIT HYPERACTIVITY DISORDER (ADHD), PREDOMINANTLY INATTENTIVE TYPE: ICD-10-CM

## 2024-04-04 DIAGNOSIS — R41.89 COGNITIVE IMPAIRMENT: ICD-10-CM

## 2024-04-04 DIAGNOSIS — F31.9 BIPOLAR 1 DISORDER (MULTI): ICD-10-CM

## 2024-04-04 PROCEDURE — 90832 PSYTX W PT 30 MINUTES: CPT | Performed by: PSYCHOLOGIST

## 2024-04-04 NOTE — PROGRESS NOTES
"8 AM 62710 Indiv Psych for ADHD, bipolar I, executive/cognitive dysfunction (30 MIN, [phone])   Virtual. Supportive Therapy. Discussed meeting with rehab psychologist, he mentioned goal of exercising more and trying new recipe weekly. So far, not doing. Hopes to increase activity level. Focused on dating, talking to woman, Zeenat, who has epilepsy, met on [employer]. Trying to consolidate with [employer] system (not familiar with but rehab. psychologist is). Said he thought of law school, but knows that may be beyond capabilities, \"don't have a purpose.\" Looking forward to [employer] baseball, got [employer] sweatshirt at [employer] recently. Was at mom's for Maru, able to see sisters. Next: 2 weeks.   "

## 2024-04-16 ENCOUNTER — LAB (OUTPATIENT)
Dept: LAB | Facility: LAB | Age: 34
End: 2024-04-16
Payer: MEDICARE

## 2024-04-16 DIAGNOSIS — E78.49 OTHER HYPERLIPIDEMIA: ICD-10-CM

## 2024-04-16 DIAGNOSIS — F31.70 BIPOLAR AFFECTIVE DISORDER IN REMISSION (MULTI): ICD-10-CM

## 2024-04-16 DIAGNOSIS — F31.9 BIPOLAR 1 DISORDER (MULTI): ICD-10-CM

## 2024-04-16 LAB
ANION GAP SERPL CALC-SCNC: 12 MMOL/L (ref 10–20)
BASOPHILS # BLD AUTO: 0.04 X10*3/UL (ref 0–0.1)
BASOPHILS NFR BLD AUTO: 0.4 %
BUN SERPL-MCNC: 15 MG/DL (ref 6–23)
CALCIUM SERPL-MCNC: 10.1 MG/DL (ref 8.6–10.6)
CHLORIDE SERPL-SCNC: 104 MMOL/L (ref 98–107)
CHOLEST SERPL-MCNC: 192 MG/DL (ref 0–199)
CHOLESTEROL/HDL RATIO: 3
CO2 SERPL-SCNC: 29 MMOL/L (ref 21–32)
CREAT SERPL-MCNC: 0.95 MG/DL (ref 0.5–1.3)
EGFRCR SERPLBLD CKD-EPI 2021: >90 ML/MIN/1.73M*2
EOSINOPHIL # BLD AUTO: 0.19 X10*3/UL (ref 0–0.7)
EOSINOPHIL NFR BLD AUTO: 1.8 %
ERYTHROCYTE [DISTWIDTH] IN BLOOD BY AUTOMATED COUNT: 14 % (ref 11.5–14.5)
GLUCOSE SERPL-MCNC: 84 MG/DL (ref 74–99)
HCT VFR BLD AUTO: 40.4 % (ref 41–52)
HDLC SERPL-MCNC: 64.6 MG/DL
HGB BLD-MCNC: 12.3 G/DL (ref 13.5–17.5)
IMM GRANULOCYTES # BLD AUTO: 0.03 X10*3/UL (ref 0–0.7)
IMM GRANULOCYTES NFR BLD AUTO: 0.3 % (ref 0–0.9)
LDLC SERPL CALC-MCNC: 104 MG/DL
LITHIUM SERPL-SCNC: 0.6 MMOL/L (ref 0.6–1.2)
LYMPHOCYTES # BLD AUTO: 2.16 X10*3/UL (ref 1.2–4.8)
LYMPHOCYTES NFR BLD AUTO: 21 %
MCH RBC QN AUTO: 27.6 PG (ref 26–34)
MCHC RBC AUTO-ENTMCNC: 30.4 G/DL (ref 32–36)
MCV RBC AUTO: 91 FL (ref 80–100)
MONOCYTES # BLD AUTO: 0.46 X10*3/UL (ref 0.1–1)
MONOCYTES NFR BLD AUTO: 4.5 %
NEUTROPHILS # BLD AUTO: 7.43 X10*3/UL (ref 1.2–7.7)
NEUTROPHILS NFR BLD AUTO: 72 %
NON HDL CHOLESTEROL: 127 MG/DL (ref 0–149)
NRBC BLD-RTO: 0 /100 WBCS (ref 0–0)
PLATELET # BLD AUTO: 310 X10*3/UL (ref 150–450)
POTASSIUM SERPL-SCNC: 4.6 MMOL/L (ref 3.5–5.3)
RBC # BLD AUTO: 4.46 X10*6/UL (ref 4.5–5.9)
SODIUM SERPL-SCNC: 140 MMOL/L (ref 136–145)
TRIGL SERPL-MCNC: 118 MG/DL (ref 0–149)
TSH SERPL-ACNC: 1.53 MIU/L (ref 0.44–3.98)
VLDL: 24 MG/DL (ref 0–40)
WBC # BLD AUTO: 10.3 X10*3/UL (ref 4.4–11.3)

## 2024-04-16 PROCEDURE — 36415 COLL VENOUS BLD VENIPUNCTURE: CPT

## 2024-04-16 PROCEDURE — 80178 ASSAY OF LITHIUM: CPT

## 2024-04-16 PROCEDURE — 80048 BASIC METABOLIC PNL TOTAL CA: CPT

## 2024-04-16 PROCEDURE — 84443 ASSAY THYROID STIM HORMONE: CPT

## 2024-04-16 PROCEDURE — 85025 COMPLETE CBC W/AUTO DIFF WBC: CPT

## 2024-04-16 PROCEDURE — 80061 LIPID PANEL: CPT

## 2024-04-18 ENCOUNTER — TELEMEDICINE (OUTPATIENT)
Dept: BEHAVIORAL HEALTH | Facility: CLINIC | Age: 34
End: 2024-04-18
Payer: MEDICARE

## 2024-04-18 DIAGNOSIS — F31.9 BIPOLAR 1 DISORDER (MULTI): ICD-10-CM

## 2024-04-18 DIAGNOSIS — F90.0 ATTENTION DEFICIT HYPERACTIVITY DISORDER (ADHD), PREDOMINANTLY INATTENTIVE TYPE: ICD-10-CM

## 2024-04-18 DIAGNOSIS — R41.89 COGNITIVE IMPAIRMENT: ICD-10-CM

## 2024-04-18 PROCEDURE — 90834 PSYTX W PT 45 MINUTES: CPT | Performed by: PSYCHOLOGIST

## 2024-04-18 NOTE — PROGRESS NOTES
8 AM [address] [facility] Psych for ADHD, bipolar I, executive/cognitive dysfunction (45 MIN, [phone])  Virtual. Supportive Therapy. Mood stable. Anxious about thought of losing/misplacing puzzles he purchased from  [employer] store. Either left in mom's care or at psychologist's office. Will reach out to mom first. Discussed checking and how consistently checking can become more compulsive. Recently, found self going back and making sure he had phone . Wants to remind self once and move on. Discussed plant care. Reading Boys on the Boat now, story of rowers during [employer], 1936. Mentioned dating, talking to woman online who has epilepsy, less focused on today. Said he's been running more but finds every other day is better for him. Also, made new recipe, which he's been discussing with rehab psychologist, challenging self with this. Going to dog sit for mom this weekend. Next: 2 weeks.

## 2024-04-24 ENCOUNTER — OFFICE VISIT (OUTPATIENT)
Dept: PSYCHOLOGY | Facility: CLINIC | Age: 34
End: 2024-04-24
Payer: MEDICARE

## 2024-04-24 DIAGNOSIS — Z87.820 HISTORY OF TRAUMATIC BRAIN INJURY: ICD-10-CM

## 2024-04-24 DIAGNOSIS — R41.844 FRONTAL LOBE AND EXECUTIVE FUNCTION DEFICIT: Primary | ICD-10-CM

## 2024-04-24 DIAGNOSIS — R41.3 MEMORY CHANGES: ICD-10-CM

## 2024-04-24 DIAGNOSIS — R41.840 ATTENTION AND CONCENTRATION DEFICIT: ICD-10-CM

## 2024-04-24 PROCEDURE — 96168 HLTH BHV IVNTJ FAM EA ADDL: CPT

## 2024-04-24 PROCEDURE — 96167 HLTH BHV IVNTJ FAM 1ST 30: CPT

## 2024-04-24 PROCEDURE — 96167 HLTH BHV IVNTJ FAM 1ST 30: CPT | Mod: AH

## 2024-04-24 PROCEDURE — 96168 HLTH BHV IVNTJ FAM EA ADDL: CPT | Mod: AH

## 2024-04-24 NOTE — PROGRESS NOTES
Name: Paige Ybarra   MRN: [id number]   Age: 33 y.o.   Date of Service: 4/24/2024    Chief Complaint:   Paige is being seen for neuropsychological rehabilitation for cognitive and emotional problems due to traumatic brain injury sustained in the context of bipolar disorder.     Session Details:   The topics of the session included completing a check-in, reviewing home practice, discussing ways to manage his calendar, and discussing importance of a consistent sleep schedule. During the check-in, he indicated misplacing items because of overconfidence. He compensated by using automatic places, organizing his home, and problem solving with his mom.  We discussed referring back to his toolkit to recall strategies, and his mother received a copy of the toolkit. The home practice was reviewed to use Neumind to remind him to clean (completed), use safe word with mom (did not use), and create meal in air fryer (completed). He continues to not like Neumind, and the plan is for him to provide feedback to the creator of Neumind and practice acceptance based strategies. The remainder of the session focused on ways to manage his schedule. He plans to schedule things immediately in Neumind, as well as review schedule at 7PM to make sure all events were entered. At the end of the session, he indicate calling off from volunteering because he was tired.  We discussed the importance of keeping a consistent sleep schedule, where he will set his alarm.     Behavioral Observations   He arrived early and accompanied to the appointment with his mother.   General Appearance: Well groomed, appropriate eye contact  Attitude/Behavior: Cooperative  Motor: No psychomotor agitation or retardation, no tremor or other abnormal movements  Speech: Normal rate, volume, prosody  Gait/Station: WFL - Within functional limits  Affect: Euthymic, full-range  Thought Process: Linear, goal directed  Thought Associations: No loosening of  associations  Thought Content: Normal  Perception: No perceptual abnormalities noted  Sensorium: Alert  Insight: Fair  Judgement: Fair    Diagnosis:   1. Frontal lobe and executive function deficit    2. Memory changes    3. Attention and concentration deficit    4. History of traumatic brain injury      Plan:   The next in-person session will be scheduled on 06/05 at 3PM. For home practice, he will practice acceptance with Neumind, use action plan to manage his schedule in Neumind, and keep consistent sleep schedule. The plan for the next session is to complete a check-in, review home practice, and discuss methods to use CogSMART strategies in everyday life.     Time:   3933-3033

## 2024-05-01 ENCOUNTER — OFFICE VISIT (OUTPATIENT)
Dept: PSYCHOLOGY | Facility: HOSPITAL | Age: 34
End: 2024-05-01
Payer: MEDICARE

## 2024-05-01 DIAGNOSIS — R41.844 FRONTAL LOBE AND EXECUTIVE FUNCTION DEFICIT: Primary | ICD-10-CM

## 2024-05-01 DIAGNOSIS — R41.89 COGNITIVE IMPAIRMENT: ICD-10-CM

## 2024-05-01 DIAGNOSIS — Z87.820 HISTORY OF TRAUMATIC BRAIN INJURY: ICD-10-CM

## 2024-05-01 DIAGNOSIS — R41.3 MEMORY CHANGES: ICD-10-CM

## 2024-05-01 PROCEDURE — 96159 HLTH BHV IVNTJ INDIV EA ADDL: CPT | Performed by: PSYCHOLOGIST

## 2024-05-01 PROCEDURE — 96158 HLTH BHV IVNTJ INDIV 1ST 30: CPT | Performed by: PSYCHOLOGIST

## 2024-05-01 NOTE — PROGRESS NOTES
Neuropsychology Note    Name: Paige Ybarra    Date of Service: 2024     Reason For Visit: Neuropsychological Rehabilitation    Summary of Visit: Paige is here for neuropsychological rehabilitation for cognitive and emotional changes resulting from traumatic brain injury. We continue to review his progress in key areas important for successful independent living. Paige reported that he is using Neumind, but he is not very comfortable with it. I will refer him to OT for support in mastering this savage. He will also email the creator of the savage with some feedback. He also brought up wanting to drive again, which is a change from a few weeks ago. We discussed using the Goal Management Framework as a tool to help make the decision and remember it too, so that he does not just focus on it as a hot goal.   We reviewed the following activities:  Volunteer jobs: Paige skipped his job at "[employer] because he was tired on one occasion. We discussed that this is not a valid reason to skip work. Otherwise he has attended all volunteer jobs.  Recipe: Paige has not made a new recipe for several weeks; for one week, he was away at a family .  Exercise: Paige is doing some running, walking, and yoga, but not with the frequency he had established for himself.  Reading: Paige is reading daily for 10 minutes and is also completing his book reading for his .  Cleaning: Some, but not adequate cleaning.  Ukelele practice: Currently 10 minutes/day twice a week; needs to increase the frequency.  Overall, Paige sees that he is not meeting his frequency goals for these activities. We made a plan where he will check Neumind in the afternoon as well as in the morning and evening. This way, he will have time to do the things he has not yet done for the day.    Time: 3:04 pm - 4:01 pm    Next Session:  2024

## 2024-05-02 ENCOUNTER — TELEMEDICINE (OUTPATIENT)
Dept: BEHAVIORAL HEALTH | Facility: CLINIC | Age: 34
End: 2024-05-02
Payer: MEDICARE

## 2024-05-02 DIAGNOSIS — F90.0 ATTENTION DEFICIT HYPERACTIVITY DISORDER (ADHD), PREDOMINANTLY INATTENTIVE TYPE: ICD-10-CM

## 2024-05-02 DIAGNOSIS — R41.3 MEMORY CHANGES: ICD-10-CM

## 2024-05-02 DIAGNOSIS — R41.844 EXECUTIVE FUNCTION DEFICIT: ICD-10-CM

## 2024-05-02 DIAGNOSIS — R41.89 COGNITIVE IMPAIRMENT: ICD-10-CM

## 2024-05-02 DIAGNOSIS — Z87.820 HISTORY OF TRAUMATIC BRAIN INJURY: ICD-10-CM

## 2024-05-02 DIAGNOSIS — R41.89 COGNITIVE DEFICITS: Primary | ICD-10-CM

## 2024-05-02 DIAGNOSIS — F31.9 BIPOLAR 1 DISORDER (MULTI): ICD-10-CM

## 2024-05-02 PROCEDURE — 90834 PSYTX W PT 45 MINUTES: CPT | Performed by: PSYCHOLOGIST

## 2024-05-02 NOTE — PROGRESS NOTES
8 AM 76758 Indiv Psych for ADHD, bipolar I, executive/cognitive dysfunction (45 MIN, [phone])  Virtual. Supportive Therapy. Getting gift for mother's day. Questioning decision. May order [employer] streaming for summer. [employer] done soon, will be off until Aug. Trying to use [employer]? Stanislav. Discussed briefly trying to drive, take OT assessment. Running more, wants to lose weight. Still talking to woman online (Zeenat), who has epilepsy, hopes to meet at some  point. Next: 2 weeks.

## 2024-05-16 ENCOUNTER — TELEMEDICINE (OUTPATIENT)
Dept: BEHAVIORAL HEALTH | Facility: CLINIC | Age: 34
End: 2024-05-16
Payer: MEDICARE

## 2024-05-16 DIAGNOSIS — F90.0 ATTENTION DEFICIT HYPERACTIVITY DISORDER (ADHD), PREDOMINANTLY INATTENTIVE TYPE: ICD-10-CM

## 2024-05-16 DIAGNOSIS — R41.89 COGNITIVE IMPAIRMENT: ICD-10-CM

## 2024-05-16 DIAGNOSIS — F31.9 BIPOLAR 1 DISORDER (MULTI): ICD-10-CM

## 2024-05-16 PROCEDURE — 90834 PSYTX W PT 45 MINUTES: CPT | Performed by: PSYCHOLOGIST

## 2024-05-16 NOTE — PROGRESS NOTES
8 AM [address]17 Indiv Psych for ADHD, bipolar I, executive/cognitive dysfunction (45 MIN, [phone])  Virtual. Supportive Therapy. Off for summer at [facility], sleeping in later. Meets OT tomorrow, wants to discuss possible driving evaluation. [employer] game Saturday with friend from [employer]. Reading routinely. Not making new recipes. Running some outdoors. Frustration with online dating. Next: 2 weeks.

## 2024-05-17 ENCOUNTER — CLINICAL SUPPORT (OUTPATIENT)
Dept: OCCUPATIONAL THERAPY | Facility: HOSPITAL | Age: 34
End: 2024-05-17
Payer: MEDICARE

## 2024-05-17 ENCOUNTER — LAB (OUTPATIENT)
Dept: LAB | Facility: LAB | Age: 34
End: 2024-05-17
Payer: MEDICARE

## 2024-05-17 DIAGNOSIS — R41.844 FRONTAL LOBE AND EXECUTIVE FUNCTION DEFICIT: ICD-10-CM

## 2024-05-17 DIAGNOSIS — R41.89 COGNITIVE IMPAIRMENT: ICD-10-CM

## 2024-05-17 DIAGNOSIS — F31.70 BIPOLAR AFFECTIVE DISORDER IN REMISSION (MULTI): ICD-10-CM

## 2024-05-17 DIAGNOSIS — R41.3 MEMORY LOSS: Primary | ICD-10-CM

## 2024-05-17 LAB
BASOPHILS # BLD AUTO: 0.04 X10*3/UL (ref 0–0.1)
BASOPHILS NFR BLD AUTO: 0.5 %
EOSINOPHIL # BLD AUTO: 0.26 X10*3/UL (ref 0–0.7)
EOSINOPHIL NFR BLD AUTO: 2.9 %
ERYTHROCYTE [DISTWIDTH] IN BLOOD BY AUTOMATED COUNT: 14.6 % (ref 11.5–14.5)
HCT VFR BLD AUTO: 41.8 % (ref 41–52)
HGB BLD-MCNC: 12.7 G/DL (ref 13.5–17.5)
IMM GRANULOCYTES # BLD AUTO: 0.03 X10*3/UL (ref 0–0.7)
IMM GRANULOCYTES NFR BLD AUTO: 0.3 % (ref 0–0.9)
LYMPHOCYTES # BLD AUTO: 2.87 X10*3/UL (ref 1.2–4.8)
LYMPHOCYTES NFR BLD AUTO: 32.3 %
MCH RBC QN AUTO: 27.5 PG (ref 26–34)
MCHC RBC AUTO-ENTMCNC: 30.4 G/DL (ref 32–36)
MCV RBC AUTO: 91 FL (ref 80–100)
MONOCYTES # BLD AUTO: 0.49 X10*3/UL (ref 0.1–1)
MONOCYTES NFR BLD AUTO: 5.5 %
NEUTROPHILS # BLD AUTO: 5.19 X10*3/UL (ref 1.2–7.7)
NEUTROPHILS NFR BLD AUTO: 58.5 %
NRBC BLD-RTO: 0 /100 WBCS (ref 0–0)
PLATELET # BLD AUTO: 259 X10*3/UL (ref 150–450)
RBC # BLD AUTO: 4.61 X10*6/UL (ref 4.5–5.9)
WBC # BLD AUTO: 8.9 X10*3/UL (ref 4.4–11.3)

## 2024-05-17 PROCEDURE — 85025 COMPLETE CBC W/AUTO DIFF WBC: CPT

## 2024-05-17 PROCEDURE — 36415 COLL VENOUS BLD VENIPUNCTURE: CPT

## 2024-05-17 PROCEDURE — 97535 SELF CARE MNGMENT TRAINING: CPT | Mod: GO | Performed by: OCCUPATIONAL THERAPIST

## 2024-05-17 PROCEDURE — 97165 OT EVAL LOW COMPLEX 30 MIN: CPT | Mod: GO | Performed by: OCCUPATIONAL THERAPIST

## 2024-05-17 ASSESSMENT — PAIN SCALES - GENERAL: PAINLEVEL_OUTOF10: 0 - NO PAIN

## 2024-05-17 ASSESSMENT — ACTIVITIES OF DAILY LIVING (ADL)
HOME_MANAGEMENT_TIME_ENTRY: 25
BATHING_ASSISTANCE: INDEPENDENT

## 2024-05-17 ASSESSMENT — ENCOUNTER SYMPTOMS
OCCASIONAL FEELINGS OF UNSTEADINESS: 0
LOSS OF SENSATION IN FEET: 0
DEPRESSION: 0

## 2024-05-17 ASSESSMENT — PAIN - FUNCTIONAL ASSESSMENT: PAIN_FUNCTIONAL_ASSESSMENT: 0-10

## 2024-05-17 NOTE — LETTER
May 20, 2024    Eli Trevino OT  [address]  Department Of Rehabilitation Services  [facility] [address]    Patient: Paige Ybarra   YOB: 1990   Date of Visit: 5/17/2024       Dear Herminia Angulo, PhD  [address]  [place] [address]    The attached plan of care is being sent to you because your patient’s medical reimbursement requires that you certify the plan of care. Your signature is required to allow uninterrupted insurance coverage.      You may indicate your approval by signing below and faxing this form back to us at Dept Fax: [phone].    Please call Dept: [phone] with any questions or concerns.    Thank you for this referral,        Eli Trevino OT  [facility]  [address]  [facility] [address]  [phone]    Payer: Payor: MEDICARE / Plan: MEDICARE PART A AND B / Product Type: *No Product type* /                                                                         Date:     Dear Eli Trevino OT,     Re: Mr. aPige Ybarra, MRN:[id number]    I certify that I have reviewed the attached plan of care and it is medically necessary for Mr. Paige Ybarra (1990) who is under my care.          ______________________________________                    _________________  Provider name and credentials                                           Date and time                                                                                           Plan of Care 5/17/24   Effective from: 5/17/2024  Effective to: 7/12/2024    Plan ID: [id number]            Participants as of Finalize on 5/20/2024    Name Type Comments Contact Info    Herminia Angulo, PhD Referring Provider  [phone]    Eli Trevino OT Occupational Therapist  [phone]       Last Plan Note     Author: Eli Trevino OT Status: Incomplete Last edited: 5/17/2024  8:00 AM       Occupational Therapy    Occupational  Therapy Evaluation    Name: Paige Ybarra  MRN: [id number]  : 1990  Date: 24  Time Calculation  Start Time: 803  Stop Time: 903  Time Calculation (min): 60 min    Assessment: Patient referred to OT by Dr. Angulo due to impaired memory affecting IADL, ability to complete prospective tasks, ability to complete things he needs to do, and impaired ability to complete new, unfamiliar tasks and tasks outside of his routine. Patient would benefit from skilled OT services to address these deficits and to provide compensatory tool/technique instruction.  OT Assessment  OT Assessment Results:  (impaired memory, impaired IADL)  Strengths: Ability to acquire knowledge, Attitude of self  Plan:  Outpatient Plan  OT Plan: instruction on compensatory tools/techniques, memory retraining, IADL retraining  Frequency: 1x/week  Duration: 8 weeks  Onset Date: 24  Certification Period Start Date: 24  Certification Period End Date: 24  Number of Treatments Authorized: [place]- [place]  Rehab Potential: Good  Plan of Care Agreement: Patient    Subjective  Current Problem:  1. Memory loss        2. Cognitive impairment  Follow Up In Occupational Therapy      3. Frontal lobe and executive function deficit  Follow Up In Occupational Therapy        General:   OT Received On: 24  General  Reason for Referral: Patient referred to OT for training on compensatory techniques to improve consistency of use and understanding of techniques.  Past Medical History Relevant to Rehab: TBI, bipolar disorder  Precautions:  Precautions  STEADI Fall Risk Score (The score of 4 or more indicates an increased risk of falling): 2  Hearing/Visual Limitations: WFL hearing, wears glasses all the time- vision WFL with glasses  Medical Precautions:  (low falls risk)  Vital Signs:     Pain Assessment:  Pain Assessment  Pain Assessment: 0-10  Pain Score: 0 - No pain    Objective  Cognition:  Patient is having difficulty  initiating/remembering new tasks.  He has forgotten things like refilling his medications for the day in his backpack.  Patient also reports needing to double check himself- he can't remember if he took medication if he throws the container away.  Patient reports need to reassure himself that he did what he was supposed to do.  Patient also reports that he has forgotten to send an important email that he needs to send.     Current strategy- Neumind- however patient indicates significantly inconsistent use, jumping to other tools, and failure of tool use altogether    Cognition  Overall Cognitive Status: Impaired  Memory: Exceptions to WFL  Short-Term Memory: Impaired  Memory Comments: impaired prospective memory.       Home Living:  Home Living  Type of Home: Apartment  Lives With: Alone  Home Adaptive Equipment: None  Home Layout: One level  Home Access: Stairs to enter with rails  Entrance Stairs-Rails:  (one flight)  Bathroom Shower/Tub: Tub/shower unit  Prior Function Per Pt/Caregiver Report:  Prior Function Per Pt/Caregiver Report  Level of [place]: Independent with ADLs and functional transfers, Independent with homemaking with ambulation  Leisure: Patient enjoys time with friends/family, walking and running, reading, tv.  Hand Dominance: Right  IADL History:  Mode of Transportation: Bus (paratransit)  Occupation:  (volunteers at [employer], [employer], and food pantry.)  ADL:  Eating Assistance: Independent  Grooming Assistance: Independent  Bathing Assistance: Independent  UE Dressing Assistance: Independent  LE Dressing Assistance: Independent  Toileting Assistance with Device: Independent  Activity Tolerance: WFL     Mobility/Transfer: independent bed mobility, functional mobility and transfers    Vision: WFL with glasses       Strength: Bilateral UE WFL          Coordination: WFL     OP EDUCATION: Provided instruction on use of Neumind as a compensatory tool for memory       Goals:  Active        OT Problem       Patient will independently use compensatory tool for memory to allow recall of prospective tasks, appointments, tasks he needs to complete.       Start:  05/17/24    Expected End:  07/12/24            Patient will remember and initiate new/unfamiliar tasks and tasks that are not part of his daily routine independently using compensatory tools.       Start:  05/20/24    Expected End:  07/12/24                  Tx: Patient verbalized desire to use Neumind as a compensatory tool. He voiced some confusion with use. Initiated instruction on use of Neumind as a compensatory tool for recall of prospective tasks.  Patient demonstrates ability to enter a reminder, but reports that he does not like that reminders go off at times when he has other commitments like volunteer work.  Patient instructed in changing times to prevent alerts during other activities. Patient moved multiple reminders with min assist and verbal cues. Patient instructed in use of task option to list un-timed to do tasks.  He had difficulty understanding difference between set time tasks and general to do.  Also provided instruction on deleting reminders.  Patient will require additional instruction and assistance to establish routine of Neumind use.            Current Participants as of 5/20/2024    Name Type Comments Contact Angel Angulo, PhD Referring Provider  [phone]    Signature pending    Eli Trevino OT Occupational Therapist  [phone]    Electronically signed by Eli Trevino OT at 5/20/2024 2078 EDT

## 2024-05-17 NOTE — PROGRESS NOTES
Occupational Therapy    Occupational Therapy Evaluation    Name: Paige Ybarra  MRN: [id number]  : 1990  Date: 24  Time Calculation  Start Time: 803  Stop Time: 903  Time Calculation (min): 60 min    Assessment: Patient referred to OT by Dr. Angulo due to impaired memory affecting IADL, ability to complete prospective tasks, ability to complete things he needs to do, and impaired ability to complete new, unfamiliar tasks and tasks outside of his routine. Patient would benefit from skilled OT services to address these deficits and to provide compensatory tool/technique instruction.  OT Assessment  OT Assessment Results:  (impaired memory, impaired IADL)  Strengths: Ability to acquire knowledge, Attitude of self  Plan:  Outpatient Plan  OT Plan: instruction on compensatory tools/techniques, memory retraining, IADL retraining  Frequency: 1x/week  Duration: 8 weeks  Onset Date: 24  Certification Period Start Date: 24  Certification Period End Date: 24  Number of Treatments Authorized: [place]- [place]  Rehab Potential: Good  Plan of Care Agreement: Patient    Subjective   Current Problem:  1. Memory loss        2. Cognitive impairment  Follow Up In Occupational Therapy      3. Frontal lobe and executive function deficit  Follow Up In Occupational Therapy        General:   OT Received On: 24  General  Reason for Referral: Patient referred to OT for training on compensatory techniques to improve consistency of use and understanding of techniques.  Past Medical History Relevant to Rehab: TBI, bipolar disorder  Precautions:  Precautions  STEADI Fall Risk Score (The score of 4 or more indicates an increased risk of falling): 2  Hearing/Visual Limitations: WFL hearing, wears glasses all the time- vision WFL with glasses  Medical Precautions:  (low falls risk)  Vital Signs:     Pain Assessment:  Pain Assessment  Pain Assessment: 0-10  Pain Score: 0 - No pain    Objective    Cognition:  Patient is having difficulty initiating/remembering new tasks.  He has forgotten things like refilling his medications for the day in his backpack.  Patient also reports needing to double check himself- he can't remember if he took medication if he throws the container away.  Patient reports need to reassure himself that he did what he was supposed to do.  Patient also reports that he has forgotten to send an important email that he needs to send.     Current strategy- Neumind- however patient indicates significantly inconsistent use, jumping to other tools, and failure of tool use altogether    Cognition  Overall Cognitive Status: Impaired  Memory: Exceptions to WFL  Short-Term Memory: Impaired  Memory Comments: impaired prospective memory.       Home Living:  Home Living  Type of Home: Apartment  Lives With: Alone  Home Adaptive Equipment: None  Home Layout: One level  Home Access: Stairs to enter with rails  Entrance Stairs-Rails:  (one flight)  Bathroom Shower/Tub: Tub/shower unit  Prior Function Per Pt/Caregiver Report:  Prior Function Per Pt/Caregiver Report  Level of [place]: Independent with ADLs and functional transfers, Independent with homemaking with ambulation  Leisure: Patient enjoys time with friends/family, walking and running, reading, tv.  Hand Dominance: Right  IADL History:  Mode of Transportation: Bus (paratransit)  Occupation:  (volunteers at [employer], [employer], and food pantry.)  ADL:  Eating Assistance: Independent  Grooming Assistance: Independent  Bathing Assistance: Independent  UE Dressing Assistance: Independent  LE Dressing Assistance: Independent  Toileting Assistance with Device: Independent  Activity Tolerance: WFL     Mobility/Transfer: independent bed mobility, functional mobility and transfers    Vision: WFL with glasses       Strength: Bilateral UE WFL          Coordination: WFL     OP EDUCATION: Provided instruction on use of Neumind as a  compensatory tool for memory       Goals:  Active       OT Problem       Patient will independently use compensatory tool for memory to allow recall of prospective tasks, appointments, tasks he needs to complete.       Start:  05/17/24    Expected End:  07/12/24            Patient will remember and initiate new/unfamiliar tasks and tasks that are not part of his daily routine independently using compensatory tools.       Start:  05/20/24    Expected End:  07/12/24                  Tx: Patient verbalized desire to use Neumind as a compensatory tool. He voiced some confusion with use. Initiated instruction on use of Neumind as a compensatory tool for recall of prospective tasks.  Patient demonstrates ability to enter a reminder, but reports that he does not like that reminders go off at times when he has other commitments like volunteer work.  Patient instructed in changing times to prevent alerts during other activities. Patient moved multiple reminders with min assist and verbal cues. Patient instructed in use of task option to list un-timed to do tasks.  He had difficulty understanding difference between set time tasks and general to do.  Also provided instruction on deleting reminders.  Patient will require additional instruction and assistance to establish routine of Neumind use.

## 2024-05-20 DIAGNOSIS — F31.70 BIPOLAR AFFECTIVE DISORDER IN REMISSION (MULTI): ICD-10-CM

## 2024-05-20 RX ORDER — CLOZAPINE 200 MG/1
TABLET ORAL
Qty: 90 TABLET | Refills: 3 | Status: SHIPPED | OUTPATIENT
Start: 2024-05-20

## 2024-05-20 RX ORDER — CLOZAPINE 25 MG/1
25 TABLET ORAL 2 TIMES DAILY
Qty: 60 TABLET | Refills: 3 | Status: SHIPPED | OUTPATIENT
Start: 2024-05-20 | End: 2024-08-18

## 2024-05-21 ENCOUNTER — TREATMENT (OUTPATIENT)
Dept: OCCUPATIONAL THERAPY | Facility: HOSPITAL | Age: 34
End: 2024-05-21
Payer: MEDICARE

## 2024-05-21 DIAGNOSIS — R41.3 MEMORY LOSS: ICD-10-CM

## 2024-05-21 DIAGNOSIS — R41.89 COGNITIVE IMPAIRMENT: Primary | ICD-10-CM

## 2024-05-21 DIAGNOSIS — R56.9 SEIZURE (MULTI): ICD-10-CM

## 2024-05-21 PROCEDURE — 97535 SELF CARE MNGMENT TRAINING: CPT | Mod: GO | Performed by: OCCUPATIONAL THERAPIST

## 2024-05-21 RX ORDER — LACOSAMIDE 150 MG/1
150 TABLET ORAL 2 TIMES DAILY
Qty: 60 TABLET | Refills: 5 | Status: SHIPPED | OUTPATIENT
Start: 2024-05-21

## 2024-05-21 RX ORDER — LACOSAMIDE 150 MG/1
150 TABLET ORAL 2 TIMES DAILY
Qty: 60 TABLET | Refills: 5 | Status: SHIPPED | OUTPATIENT
Start: 2024-05-21 | End: 2024-05-21 | Stop reason: SDUPTHER

## 2024-05-21 ASSESSMENT — ACTIVITIES OF DAILY LIVING (ADL): HOME_MANAGEMENT_TIME_ENTRY: 59

## 2024-05-21 NOTE — PROGRESS NOTES
I have personally reviewed the OARRS report for Paige Ybarra. I have considered the risks of abuse, dependence, addiction and diversion and I believe that it is clinically appropriate for Paige Ybarra to be prescribed this medication  Last Urine Drug Screen / ordered today: No

## 2024-05-21 NOTE — PROGRESS NOTES
Occupational Therapy    Occupational Therapy Treatment    Name: Paige Ybarra  MRN: [id number]  : 1990  Date: 24  Time Calculation  Start Time: 730  Stop Time: 829  Time Calculation (min): 59 min    Assessment:  Patient receptive to instruction on use of Neumind. He will require additional instruction and assistance to further improve skills and to integrate Neumind into daily routine. He was receptive to establishing measureable goals to help in learning Neumind.  Patient demonstrates insight into how use of this compensatory tool with help him, but still has some resistance to following recommendations from MD to learn this tool.      Plan:  Continue OT tx with further instruction on memory and initiation strategies.  Onset Date: 24  Certification Period Start Date: 24  Certification Period End Date: 24  Number of Treatments [place]ized[place], today is visit 2    Subjective   Patient reports needing to be better about using Neumind.  He reports using Neumind 2/4 days since he last saw OT.   He reports being at mom's this weekend and treating it as a vacation and being in her world, not his.  Patient reports motivation to learn Neumind because his mom and care team think this is the best tool for people with TBI.    Patient reports that he saw Dr. Angulo 2 weeks ago and did not do his homework.   General:  OT Last Visit  OT Received On: 24  General  Reason for Referral: Patient referred to OT for training on compensatory techniques to improve consistency of use and understanding of techniques.  Past Medical History Relevant to Rehab: TBI, bipolar disorder  Precautions:  Hearing/Visual Limitations: WFL hearing, wears glasses all the time- vision WFL with glasses  Medical Precautions:  (low falls risk)  Pain Assessment: 0/10       Objective      IADL's:   What went well- checking things off that he did  What did not go well- checking things off at end of the day  instead of the time he did them, missing tasks by not looking at Neumind    Patient has used reminders but not tasks. He likes having everything in one place. He did not have a chance to see if changes made to reminder times were successful.      Provided instruction on use of SMART goals.  Patient developed goal of checking Neumind 2-3x a day within one week.    Steps-  - check reminders   Patient placed task in Neumind and in his notes section.   Patient feels that this is enough to make checking Neumind a habit.   Barriers to checking Neumind   -alerts are obnoxious   - numbness to alerts    Patient reported that his mom keeps reminding him to  his feet when walking, but that he does not remember this.  Provided instruction on use of nudges so that he will be randomly reminded of this throughout the day.  Patient required cues to enter this.     Patient concerned with ability to know what is important when looking at his list of reminders.  Provided instruction on use of emoji or a number before reminder to allow easily identifiable task.      Patient completed Neumind reminder check.  Patient asked to identify his priorities and to determine his method for priority identification. Patient elected to use a star.              OP EDUCATION:  compensatory tool Neumind for memory and initiation compensation       Goals:  Active       OT Problem       Patient will independently use compensatory tool for memory to allow recall of prospective tasks, appointments, tasks he needs to complete.       Start:  05/17/24    Expected End:  07/12/24            Patient will remember and initiate new/unfamiliar tasks and tasks that are not part of his daily routine independently using compensatory tools.       Start:  05/20/24    Expected End:  07/12/24

## 2024-05-22 DIAGNOSIS — F31.70 BIPOLAR AFFECTIVE DISORDER IN REMISSION (MULTI): ICD-10-CM

## 2024-05-23 RX ORDER — LITHIUM CARBONATE 300 MG/1
TABLET, FILM COATED, EXTENDED RELEASE ORAL
Qty: 56 TABLET | Refills: 10 | Status: SHIPPED | OUTPATIENT
Start: 2024-05-23

## 2024-05-30 ENCOUNTER — TELEMEDICINE (OUTPATIENT)
Dept: BEHAVIORAL HEALTH | Facility: CLINIC | Age: 34
End: 2024-05-30
Payer: MEDICARE

## 2024-05-30 DIAGNOSIS — F31.9 BIPOLAR 1 DISORDER (MULTI): ICD-10-CM

## 2024-05-30 DIAGNOSIS — R41.89 COGNITIVE IMPAIRMENT: ICD-10-CM

## 2024-05-30 DIAGNOSIS — F90.0 ATTENTION DEFICIT HYPERACTIVITY DISORDER (ADHD), PREDOMINANTLY INATTENTIVE TYPE: ICD-10-CM

## 2024-05-30 PROCEDURE — 90834 PSYTX W PT 45 MINUTES: CPT | Performed by: PSYCHOLOGIST

## 2024-05-30 NOTE — PROGRESS NOTES
8 AM [address]1339 Indiv Psych for ADHD, bipolar I, executive/cognitive dysfunction (45 MIN, 810-943). Virtual. Supportive Therapy. Discussed online dating. Still not engaging online activity until after 4pm. Started tennis lessons. Feeling bad about sleeping in but still achieving most goals he has for self. Will discuss with OT driving assessment. Still reading. Wants to lose weight. Discussed various running programs today. Mom having foot surgery June 5. Next: 2 weeks.

## 2024-05-31 ENCOUNTER — TREATMENT (OUTPATIENT)
Dept: OCCUPATIONAL THERAPY | Facility: HOSPITAL | Age: 34
End: 2024-05-31
Payer: MEDICARE

## 2024-05-31 DIAGNOSIS — R41.3 MEMORY LOSS: ICD-10-CM

## 2024-05-31 DIAGNOSIS — R41.89 COGNITIVE IMPAIRMENT: Primary | ICD-10-CM

## 2024-05-31 PROCEDURE — 97535 SELF CARE MNGMENT TRAINING: CPT | Mod: GO | Performed by: OCCUPATIONAL THERAPIST

## 2024-05-31 ASSESSMENT — ACTIVITIES OF DAILY LIVING (ADL): HOME_MANAGEMENT_TIME_ENTRY: 57

## 2024-05-31 NOTE — PROGRESS NOTES
Occupational Therapy    Occupational Therapy Treatment    Name: Paige Ybarra  MRN: [id number]  : 1990  Date: 24  Time Calculation  Start Time: 1002  Stop Time: 1059  Time Calculation (min): 57 min    Assessment: Patient required some re-instruction today due to memory deficits. He is showing improvement in use of Neumind as a memory aid, increasing to use 3-4 times the past week. Patient will need to determine most appropriate times when setting reminders so that he can respond to them immediately and decrease tendency to ignore.  Continued OT indicated for further instruction on memory strategies/tools.     Plan: Continue OT tx.  Onset Date: 24  Certification Period Start Date: 24  Certification Period End Date: 24  Number of Treatments [place]ized[place], today is visit 3    Subjective   General:  OT Last Visit  OT Received On: 24  General  Reason for Referral: Patient referred to OT for training on compensatory techniques to improve consistency of use and understanding of techniques.  Past Medical History Relevant to Rehab: TBI, bipolar disorder  Precautions:  Medical Precautions:  (low falls risk)  Pain Assessment: 0/10       Objective       IADL's: Patient looked at Neumind during the day and evening 3-4 days over the last week. Patient is ignoring tasks because they are again going off when he is other places. Discussed the fact that if task is going off when he is not home, he can't realistically complete that task.  Provided additional instruction on changing times in Neumind. He changed times for taking out trash and square breathing. He ended up with duplicate times for reading, take out trash, and square breathing. Recommended patient make them different such as scheduling one for 5:15 and another item for 5:30. Patient changed times to allow individual reminders instead of 3 at one time.     Patient reports desire to drive. He has not addressed with mom and  Dr. Angulo. Prompted patient to place reminder  in Neumind, however patient does not feel the need for reminder. Patient with questions related to driving.  Patient asked how he could remember these questions and remember to address them. Patient identified plan to put in Neumind but was unsure how to do this.  Provided instruction on use of note section. After instruction, patient was able to list his questions. Also provided instruction on linking note to a reminder.  Note with questions linked to appointment reminder.           OP EDUCATION:  continued instruction on use of Neumind as a compensatory tool       Goals:  Active       OT Problem       Patient will independently use compensatory tool for memory to allow recall of prospective tasks, appointments, tasks he needs to complete.       Start:  05/17/24    Expected End:  07/12/24            Patient will remember and initiate new/unfamiliar tasks and tasks that are not part of his daily routine independently using compensatory tools.       Start:  05/20/24    Expected End:  07/12/24

## 2024-06-05 ENCOUNTER — OFFICE VISIT (OUTPATIENT)
Dept: PSYCHOLOGY | Facility: CLINIC | Age: 34
End: 2024-06-05
Payer: MEDICARE

## 2024-06-05 DIAGNOSIS — R41.840 ATTENTION AND CONCENTRATION DEFICIT: ICD-10-CM

## 2024-06-05 DIAGNOSIS — Z87.820 HISTORY OF TRAUMATIC BRAIN INJURY: ICD-10-CM

## 2024-06-05 DIAGNOSIS — R41.3 MEMORY CHANGES: ICD-10-CM

## 2024-06-05 DIAGNOSIS — R41.844 FRONTAL LOBE AND EXECUTIVE FUNCTION DEFICIT: Primary | ICD-10-CM

## 2024-06-05 PROCEDURE — 96159 HLTH BHV IVNTJ INDIV EA ADDL: CPT

## 2024-06-05 PROCEDURE — 96158 HLTH BHV IVNTJ INDIV 1ST 30: CPT | Mod: AH

## 2024-06-05 PROCEDURE — 96159 HLTH BHV IVNTJ INDIV EA ADDL: CPT | Mod: AH

## 2024-06-05 PROCEDURE — 96158 HLTH BHV IVNTJ INDIV 1ST 30: CPT

## 2024-06-05 NOTE — PROGRESS NOTES
Name: Paige Ybarra   MRN: [id number]   Age: 33 y.o.   Date of Service: 6/5/2024    Chief Complaint:   Paige is being seen for neuropsychological rehabilitation for cognitive and emotional problems due to traumatic brain injury sustained in the context of bipolar disorder.     Session Details:   He arrived on time and unaccompanied to the appointment.    The topics for the session included completing a check-in, reviewing home practice, and discussing return to driving. During the check-in, he reported starting to learn tennis and continuing to online date. The home practice was reviewed to practice acceptance with [employer] (attempting and working with Sharon), use action plan to manage his schedule in Neumind (partially completed), and keep consistent sleep schedule (not completed). We problem solved keeping a consistent sleep schedule, as he reported problems with sleeping in on weekends. The plan is to make his bed when alarm goes off, setting multiple alarms to get up, and changing second alarm ringtone. The remainder of session focused on his desire to return to driving. We discussed pros/cons, and he was open to completing a driving evaluation. He was worried he would not qualify for paratransit, and we contacted them. They indicated this would not impact his ability to use paratransit. A driving evaluation was ordered. The rest of session focused on helping him converse on online dating platforms.     Diagnosis:   1. Memory changes    2. Frontal lobe and executive function deficit    3. Attention and concentration deficit    4. History of traumatic brain injury      Plan:   The next in-person session will be scheduled on 08/07 at 3PM. For home practice, he will implement plan to keep consistent sleep schedule and schedule driving evaluation. The plan for the next session is to complete a check-in, review home practice, and discuss methods to use CogSMART strategies in everyday life.     Time:   3177-5264

## 2024-06-06 ENCOUNTER — LAB (OUTPATIENT)
Dept: LAB | Facility: LAB | Age: 34
End: 2024-06-06
Payer: MEDICARE

## 2024-06-06 ENCOUNTER — OFFICE VISIT (OUTPATIENT)
Dept: BEHAVIORAL HEALTH | Facility: CLINIC | Age: 34
End: 2024-06-06
Payer: MEDICARE

## 2024-06-06 VITALS
DIASTOLIC BLOOD PRESSURE: 86 MMHG | TEMPERATURE: 98.2 F | WEIGHT: 187.6 LBS | SYSTOLIC BLOOD PRESSURE: 130 MMHG | HEART RATE: 81 BPM | BODY MASS INDEX: 27.78 KG/M2 | HEIGHT: 69 IN

## 2024-06-06 DIAGNOSIS — F31.70 BIPOLAR AFFECTIVE DISORDER IN REMISSION (MULTI): ICD-10-CM

## 2024-06-06 DIAGNOSIS — F41.9 ANXIETY: ICD-10-CM

## 2024-06-06 DIAGNOSIS — R41.844 EXECUTIVE FUNCTION DEFICIT: ICD-10-CM

## 2024-06-06 DIAGNOSIS — F31.9 BIPOLAR 1 DISORDER (MULTI): ICD-10-CM

## 2024-06-06 DIAGNOSIS — R41.89 COGNITIVE IMPAIRMENT: ICD-10-CM

## 2024-06-06 LAB
BASOPHILS # BLD AUTO: 0.04 X10*3/UL (ref 0–0.1)
BASOPHILS NFR BLD AUTO: 0.5 %
EOSINOPHIL # BLD AUTO: 0.2 X10*3/UL (ref 0–0.7)
EOSINOPHIL NFR BLD AUTO: 2.5 %
ERYTHROCYTE [DISTWIDTH] IN BLOOD BY AUTOMATED COUNT: 14.7 % (ref 11.5–14.5)
HCT VFR BLD AUTO: 42.2 % (ref 41–52)
HGB BLD-MCNC: 12.9 G/DL (ref 13.5–17.5)
IMM GRANULOCYTES # BLD AUTO: 0.01 X10*3/UL (ref 0–0.7)
IMM GRANULOCYTES NFR BLD AUTO: 0.1 % (ref 0–0.9)
LYMPHOCYTES # BLD AUTO: 2.25 X10*3/UL (ref 1.2–4.8)
LYMPHOCYTES NFR BLD AUTO: 28.3 %
MCH RBC QN AUTO: 28 PG (ref 26–34)
MCHC RBC AUTO-ENTMCNC: 30.6 G/DL (ref 32–36)
MCV RBC AUTO: 92 FL (ref 80–100)
MONOCYTES # BLD AUTO: 0.39 X10*3/UL (ref 0.1–1)
MONOCYTES NFR BLD AUTO: 4.9 %
NEUTROPHILS # BLD AUTO: 5.05 X10*3/UL (ref 1.2–7.7)
NEUTROPHILS NFR BLD AUTO: 63.7 %
NRBC BLD-RTO: 0 /100 WBCS (ref 0–0)
PLATELET # BLD AUTO: 238 X10*3/UL (ref 150–450)
RBC # BLD AUTO: 4.61 X10*6/UL (ref 4.5–5.9)
WBC # BLD AUTO: 7.9 X10*3/UL (ref 4.4–11.3)

## 2024-06-06 PROCEDURE — 99213 OFFICE O/P EST LOW 20 MIN: CPT | Performed by: PSYCHIATRY & NEUROLOGY

## 2024-06-06 PROCEDURE — 3079F DIAST BP 80-89 MM HG: CPT | Performed by: PSYCHIATRY & NEUROLOGY

## 2024-06-06 PROCEDURE — 85025 COMPLETE CBC W/AUTO DIFF WBC: CPT

## 2024-06-06 PROCEDURE — 36415 COLL VENOUS BLD VENIPUNCTURE: CPT

## 2024-06-06 PROCEDURE — 3075F SYST BP GE 130 - 139MM HG: CPT | Performed by: PSYCHIATRY & NEUROLOGY

## 2024-06-06 PROCEDURE — 1036F TOBACCO NON-USER: CPT | Performed by: PSYCHIATRY & NEUROLOGY

## 2024-06-06 ASSESSMENT — PAIN SCALES - GENERAL: PAINLEVEL: 0-NO PAIN
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
## 2024-06-06 NOTE — PATIENT INSTRUCTIONS
Plan:   - Continue current medications:   Buspirone 5mg in the morning, 10mg in the evening  Clozapine 225mg (200mg +25mg) in AM, 425mg (two 200mg + 25mg) at night  Lithium 450mg in the morning; 600mg in the evening.   Gabapentin 400mg tid.   Folic acid 1mg daily.  - May take melatonin 5 to 10mg only as needed for sleep.   - Monthly blood draws for CBC for clozapine to monitor for agranulocytosis.   - Continue cognitive rehab and psychotherapy.  - Follow up regularly with other providers.  - Follow up in 3 months.   - Contact via [facility] or call sooner ([phone]) in case of any questions or concerns.  - Call [phone] for mental health crisis or suicidal thoughts, or call 911 or go to the nearest emergency room for emergencies.    Brain-healthy lifestyle:        Eat heart-healthy diet       Regular physical activity        Regular mental exercise such as reading books, doing puzzles, playing cards/board games etc.        Stay socially engaged       Maintain good sleep hygiene and try to get 7 to 8 hours of continuous sleep at bedtime.

## 2024-06-07 ENCOUNTER — TREATMENT (OUTPATIENT)
Dept: OCCUPATIONAL THERAPY | Facility: HOSPITAL | Age: 34
End: 2024-06-07
Payer: MEDICARE

## 2024-06-07 DIAGNOSIS — R41.89 COGNITIVE IMPAIRMENT: Primary | ICD-10-CM

## 2024-06-07 DIAGNOSIS — R41.3 MEMORY LOSS: ICD-10-CM

## 2024-06-07 PROCEDURE — 97535 SELF CARE MNGMENT TRAINING: CPT | Mod: GO | Performed by: OCCUPATIONAL THERAPIST

## 2024-06-07 ASSESSMENT — ACTIVITIES OF DAILY LIVING (ADL): HOME_MANAGEMENT_TIME_ENTRY: 60

## 2024-06-07 NOTE — PROGRESS NOTES
Occupational Therapy    Occupational Therapy Treatment    Name: Paige Ybarra  MRN: [id number]  : 1990  Date: 24  Time Calculation  Start Time: 803  Stop Time: 903  Time Calculation (min): 60 min    Assessment:  Patient demonstrating increased follow through with use of selected memory aide-Neumind.  He will require additional instruction on difference between reminders and tasks and when to use each.  Patient using Neumind multiple times a day, but still struggles with responding at time of alert. Continued OT indicated for further instruction on cognitive strategies and Neumind use.       Plan:  Onset Date: 24  Certification Period Start Date: 24  Certification Period End Date: 24  Number of Treatments Authorized: [place], today is visit 4    Subjective   General:  Patient reports that he is is looking at to alerts as they go off. He however is looking at it(Neumind) multiple times a day and is finding this helpful. Patient reports that he is getting his takes done.     OT Last Visit  OT Received On: 24  General  Reason for Referral: Patient referred to OT for training on compensatory techniques to improve consistency of use and understanding of techniques.  Past Medical History Relevant to Rehab: TBI, bipolar disorder  Precautions:  Medical Precautions:  (low falls risk)  Pain Assessment: 0/10       Objective    IADL's:   Neumind use- Patient reports that he he did all the items on Neumind list, but did not check them off.      Patient asking for clarification on reminders vs tasks in Neumind. Tasks with specific times  or needing to repeat should be a reminder.  Tasks without specific times cn be entered in tasks. Patient participated in brainstorming appropriate tasks. Patient able to give appropriate examples of tasks and reminders after additional instruction.     Moved check Neumind to reminders instead of task, so that patient has repeat notice.     Patient able  to reschedule reminders, delete reminders, enter reminder, duplicate reminders      OP EDUCATION: instruction on compensatory memory strategies       Goals:  Active       OT Problem       Patient will independently use compensatory tool for memory to allow recall of prospective tasks, appointments, tasks he needs to complete.       Start:  05/17/24    Expected End:  07/12/24            Patient will remember and initiate new/unfamiliar tasks and tasks that are not part of his daily routine independently using compensatory tools.       Start:  05/20/24    Expected End:  07/12/24

## 2024-06-13 ENCOUNTER — APPOINTMENT (OUTPATIENT)
Dept: BEHAVIORAL HEALTH | Facility: CLINIC | Age: 34
End: 2024-06-13
Payer: MEDICARE

## 2024-06-13 DIAGNOSIS — F41.9 ANXIETY: ICD-10-CM

## 2024-06-13 DIAGNOSIS — F31.9 BIPOLAR 1 DISORDER (MULTI): ICD-10-CM

## 2024-06-13 DIAGNOSIS — F90.0 ATTENTION DEFICIT HYPERACTIVITY DISORDER (ADHD), PREDOMINANTLY INATTENTIVE TYPE: ICD-10-CM

## 2024-06-13 DIAGNOSIS — R41.89 COGNITIVE IMPAIRMENT: ICD-10-CM

## 2024-06-13 PROCEDURE — 90834 PSYTX W PT 45 MINUTES: CPT | Performed by: PSYCHOLOGIST

## 2024-06-13 NOTE — PROGRESS NOTES
8 AM [address] [facility] Psych for ADHD, bipolar I, executive/cognitive dysfunction (45 MIN, [phone]). Virtual. Supportive Therapy. Mood stable. Getting to usual activities-[employer], [employer]. Going to [facility] [employer] picnic later today from volunteering. Talked to sisterLupe (VIR) yesterday. Still discussing dating, hopes to meet someone, talking to someone on [employer] (Dorothea). Running, up ot 2 miles. Discussed importance of developing sense of purpose. Next: 1 month.

## 2024-06-14 RX ORDER — BUSPIRONE HYDROCHLORIDE 5 MG/1
TABLET ORAL
Qty: 84 TABLET | Refills: 10 | Status: SHIPPED | OUTPATIENT
Start: 2024-06-14

## 2024-06-14 RX ORDER — FOLIC ACID 1 MG/1
1 TABLET ORAL DAILY
Qty: 28 TABLET | Refills: 10 | Status: SHIPPED | OUTPATIENT
Start: 2024-06-14

## 2024-06-19 ENCOUNTER — OFFICE VISIT (OUTPATIENT)
Dept: PSYCHOLOGY | Facility: HOSPITAL | Age: 34
End: 2024-06-19
Payer: MEDICARE

## 2024-06-19 DIAGNOSIS — Z87.820 HISTORY OF TRAUMATIC BRAIN INJURY: ICD-10-CM

## 2024-06-19 DIAGNOSIS — R41.844 FRONTAL LOBE AND EXECUTIVE FUNCTION DEFICIT: Primary | ICD-10-CM

## 2024-06-19 DIAGNOSIS — R41.840 ATTENTION AND CONCENTRATION DEFICIT: ICD-10-CM

## 2024-06-19 DIAGNOSIS — R41.3 MEMORY CHANGES: ICD-10-CM

## 2024-06-19 PROCEDURE — 96167 HLTH BHV IVNTJ FAM 1ST 30: CPT | Performed by: PSYCHOLOGIST

## 2024-06-19 PROCEDURE — 96168 HLTH BHV IVNTJ FAM EA ADDL: CPT | Performed by: PSYCHOLOGIST

## 2024-06-19 NOTE — PROGRESS NOTES
Neuropsychology Note    Name: Paige Ybarra    Date of Service: 6/19/2024     Reason For Visit: Neuropsychological Rehabilitation    Summary of Visit:  Paige Ybarra is being seen for neuropsychological rehabilitation of cognitive and emotional changes resulting from severe traumatic brain injury. His mother participated from the phone. We reviewed the basic elements of Paieg's schedule and independent living requirements as follows:  Exercise: Paige is regularly exercising including running, walking, yoga, and tennis and is meeting criteria mostly. He is not doing his tennis skills practice, so he added a reminder for this to [employer], his memory and planning savage.   Work: [employer] Is out for the summer. He is consistently attending [employer] and the [employer].  New recipes: Not cooking new meals due to a lot of frozen meals; discussed not purchasing those in favor of healthier meals that he prepares  Reading: Paige is reading 10 minutes a day as well as reading for his . He needs to rad more consistently for his  so he doesn't have to binge read right before meetings with her.  Uke practice: improved from last session; playing 10 minutes/day for 5 days/week.   House cleaning: Not up to criteria; fails to attend to reminders in Neumind  We then discussed his memory strategies and tools. He continues to use Neumind even though he fins aspects of it irritating. He has been in contact with the developers to give feedback. We agreed it would be helpful for him to review both notes from sessions and Neumind each evening; he put a reminder in to do this at 5 pm.  He was encouraged to write down meaningful events so he can remember them later and to resume taking photos of events.  Dating: Paige said he dates because he is lonely, so he has been getting together with friends more, which has been good. I will send him information about a peer support group for TBI survivors.   Driving:  Paige wants to drive to increase his independence. He will get an OT driving eval and then perhaps get lessons from a driving school.   4 pm rule: Paige is following the 4 pm rule of No Social Media before 4 pm. We agreed that for the summer, he can access social media between 5 am and 7 am.    Time: 3:01 pm - 4:00 pm    Next Session:  7/31/2024

## 2024-06-21 ENCOUNTER — APPOINTMENT (OUTPATIENT)
Dept: OCCUPATIONAL THERAPY | Facility: HOSPITAL | Age: 34
End: 2024-06-21
Payer: MEDICARE

## 2024-07-11 ENCOUNTER — APPOINTMENT (OUTPATIENT)
Dept: BEHAVIORAL HEALTH | Facility: CLINIC | Age: 34
End: 2024-07-11
Payer: MEDICARE

## 2024-07-12 DIAGNOSIS — F31.70 BIPOLAR AFFECTIVE DISORDER IN REMISSION (MULTI): ICD-10-CM

## 2024-07-15 RX ORDER — GABAPENTIN 400 MG/1
400 CAPSULE ORAL 3 TIMES DAILY
Qty: 84 CAPSULE | Refills: 10 | Status: SHIPPED | OUTPATIENT
Start: 2024-07-15

## 2024-07-16 ENCOUNTER — LAB (OUTPATIENT)
Dept: LAB | Facility: LAB | Age: 34
End: 2024-07-16
Payer: MEDICARE

## 2024-07-16 DIAGNOSIS — F31.70 BIPOLAR AFFECTIVE DISORDER IN REMISSION (MULTI): ICD-10-CM

## 2024-07-16 LAB
BASOPHILS # BLD AUTO: 0.04 X10*3/UL (ref 0–0.1)
BASOPHILS NFR BLD AUTO: 0.4 %
EOSINOPHIL # BLD AUTO: 0.25 X10*3/UL (ref 0–0.7)
EOSINOPHIL NFR BLD AUTO: 2.7 %
ERYTHROCYTE [DISTWIDTH] IN BLOOD BY AUTOMATED COUNT: 14.5 % (ref 11.5–14.5)
HCT VFR BLD AUTO: 39 % (ref 41–52)
HGB BLD-MCNC: 12.5 G/DL (ref 13.5–17.5)
IMM GRANULOCYTES # BLD AUTO: 0.03 X10*3/UL (ref 0–0.7)
IMM GRANULOCYTES NFR BLD AUTO: 0.3 % (ref 0–0.9)
LYMPHOCYTES # BLD AUTO: 2.46 X10*3/UL (ref 1.2–4.8)
LYMPHOCYTES NFR BLD AUTO: 26.5 %
MCH RBC QN AUTO: 28 PG (ref 26–34)
MCHC RBC AUTO-ENTMCNC: 32.1 G/DL (ref 32–36)
MCV RBC AUTO: 87 FL (ref 80–100)
MONOCYTES # BLD AUTO: 0.54 X10*3/UL (ref 0.1–1)
MONOCYTES NFR BLD AUTO: 5.8 %
NEUTROPHILS # BLD AUTO: 5.98 X10*3/UL (ref 1.2–7.7)
NEUTROPHILS NFR BLD AUTO: 64.3 %
NRBC BLD-RTO: 0 /100 WBCS (ref 0–0)
PLATELET # BLD AUTO: 221 X10*3/UL (ref 150–450)
RBC # BLD AUTO: 4.46 X10*6/UL (ref 4.5–5.9)
WBC # BLD AUTO: 9.3 X10*3/UL (ref 4.4–11.3)

## 2024-07-16 PROCEDURE — 36415 COLL VENOUS BLD VENIPUNCTURE: CPT

## 2024-07-16 PROCEDURE — 85025 COMPLETE CBC W/AUTO DIFF WBC: CPT

## 2024-07-19 ENCOUNTER — TREATMENT (OUTPATIENT)
Dept: OCCUPATIONAL THERAPY | Facility: HOSPITAL | Age: 34
End: 2024-07-19
Payer: MEDICARE

## 2024-07-19 DIAGNOSIS — R41.3 MEMORY LOSS: ICD-10-CM

## 2024-07-19 DIAGNOSIS — R41.89 COGNITIVE IMPAIRMENT: Primary | ICD-10-CM

## 2024-07-19 PROCEDURE — 97535 SELF CARE MNGMENT TRAINING: CPT | Mod: GO | Performed by: OCCUPATIONAL THERAPIST

## 2024-07-19 ASSESSMENT — ACTIVITIES OF DAILY LIVING (ADL): HOME_MANAGEMENT_TIME_ENTRY: 56

## 2024-07-19 ASSESSMENT — PAIN SCALES - GENERAL: PAINLEVEL_OUTOF10: 0 - NO PAIN

## 2024-07-19 ASSESSMENT — PAIN - FUNCTIONAL ASSESSMENT: PAIN_FUNCTIONAL_ASSESSMENT: 0-10

## 2024-07-19 NOTE — PROGRESS NOTES
Occupational Therapy    Occupational Therapy Treatment    Name: Paige Ybarra  MRN: [id number]  : 1990  Date: 24    Time Entry:  Time Calculation  Start Time: 0900  Stop Time: 956  Time Calculation (min): 56 min        OT Therapeutic Procedures Time Entry  Self Care/Home Management (ADLs) Time Entry: 56      Assessment:  Patient is making progress toward mechanics of his selected compensatory tool- Neumind.  He demonstrated recent decline in use after vacation.  Patient is able to enter, edit, delete reminders. He would benefit from increased use of notes and journals and would benefit from linking these to his reminders.  He was receptive to instruction on use of these tools, but will require additional instruction and reenforcement to make use routine. Patient continues to check Neumind in am and pm only. Recommended mid day check to allow patient to view tasks he has not completed.  Patient resistive to checking Neumind at the time reminder alerts.  Continue OT to provide further instruction on use of Neumind, and to improve self initiation and follow through of tasks and improve compensation for memory deficits. Goals reviewed and plan of care updated today.      Plan:  OT Plan: instruction on compensatory tools/techniques, memory retraining, IADL retraining  Frequency: 2x/month  Duration: 12 weeks  Onset Date: 24  Certification Period Start Date: 24  Certification Period End Date: 10/12/24  Number of Treatments [place]ized[place], today is visit 5    Subjective   General:  OT Last Visit  OT Received On: 24Reports that he did not use Neumind on vacation but wants to get back to using it.   General  Reason for Referral: Patient referred to OT for training on compensatory techniques to improve consistency of use and understanding of techniques.  Referred By: Dr. Herminia Angulo  Past Medical History Relevant to Rehab: TBI, bipolar disorder  Precautions:  Hearing/Visual  Limitations: WFL hearing, wears glasses all the time- vision WFL with glasses  Medical Precautions:  (low falls risk)  Pain Assessment:  Pain Assessment  Pain Assessment: 0-10  0-10 (Numeric) Pain Score: 0 - No pain    Objective       IADL's:   Neumind use- patient is checking Neumind 2x a day.  Patient indicates that he has been able to keep tasks entered to times he is not in other activities. He however is not checking reminders at the time they alert. Recommend addition of mid day check to allow patient to see his progress toward reminders for day and to bring increased awareness to what he still needs to complete.     Provided instruction on use of notes and journals. Encouraged patient to begin to use notes for things he has to remember that are not simply appointments or time based events and to use journal for gratitude.  Patient placed note in Neumind reminding him of steps to return to driving including practicing in empty parking lots with mom and asking Dr. Hurt or  Dr. Angulo for referral to OT 's assessment. Provided additional instruction on linking notes to reminders.            OP EDUCATION:  Patient instructed in use of journals, notes and linking of these to reminders.  Patient also instructed in recommendation for mid day Neumind check.       Goals:  Active       OT Problem       Patient will independently use compensatory tool for memory to allow recall of prospective tasks, appointments, tasks he needs to complete.       Start:  05/17/24    Expected End:  10/12/24            Patient will remember and initiate new/unfamiliar tasks and tasks that are not part of his daily routine independently using compensatory tools.       Start:  05/20/24    Expected End:  10/12/24

## 2024-07-19 NOTE — LETTER
July 21, 2024    Eli Trevino OT  [id number]  Department Of Rehabilitation Services  [facility] [address]    Patient: Paige Ybarra   YOB: 1990   Date of Visit: 7/19/2024       Dear Herminia Angulo, PhD  [id number]  [place] [address]    The attached plan of care is being sent to you because your patient’s medical reimbursement requires that you certify the plan of care. Your signature is required to allow uninterrupted insurance coverage.      You may indicate your approval by signing below and faxing this form back to us at Dept Fax: [phone].    Please call Dept: [phone] with any questions or concerns.    Thank you for this referral,        Eli Trevino OT  [facility]  [address]  [facility] [address]  [phone]    Payer: Payor: MEDICARE / Plan: MEDICARE PART A AND B / Product Type: *No Product type* /                                                                         Date:     Dear Eli Trevino OT,     Re: Mr. Paige Ybarra, MRN:[id number]    I certify that I have reviewed the attached plan of care and it is medically necessary for Mr. Paige Ybarra (1990) who is under my care.          ______________________________________                    _________________  Provider name and credentials                                           Date and time                                                                                           Plan of Care 7/13/24   Effective from: 7/13/2024  Effective to: 10/12/2024    Active  Plan ID: [id number]           Participants as of 7/21/2024    Name Type Comments Contact Info    Herminia Angulo, PhD Referring Provider  [phone]    Eli Trevino OT Occupational Therapist  [phone]      Last Plan Note     Author: Eli Trevino OT Status: Incomplete Last edited: 7/19/2024  9:00 AM       Occupational Therapy    Occupational Therapy  Treatment    Name: Paige Ybarra  MRN: [id number]  : 1990  Date: 24    Time Entry:  Time Calculation  Start Time: 0900  Stop Time: 56  Time Calculation (min): 56 min        OT Therapeutic Procedures Time Entry  Self Care/Home Management (ADLs) Time Entry: 56      Assessment:  Patient is making progress toward mechanics of his selected compensatory tool- Neumind.  He demonstrated recent decline in use after vacation.  Patient is able to enter, edit, delete reminders. He would benefit from increased use of notes and journals and would benefit from linking these to his reminders.  He was receptive to instruction on use of these tools, but will require additional instruction and reenforcement to make use routine. Patient continues to check Neumind in am and pm only. Recommended mid day check to allow patient to view tasks he has not completed.  Patient resistive to checking Neumind at the time reminder alerts.  Continue OT to provide further instruction on use of Neumind, and to improve self initiation and follow through of tasks and improve compensation for memory deficits. Goals reviewed and plan of care updated today.      Plan:  OT Plan: instruction on compensatory tools/techniques, memory retraining, IADL retraining  Frequency: 2x/month  Duration: 12 weeks  Onset Date: 24  Certification Period Start Date: 24  Certification Period End Date: 10/12/24  Number of Treatments [place]ized[place], today is visit 5    Subjective  General:  OT Last Visit  OT Received On: 24Reports that he did not use Neumind on vacation but wants to get back to using it.   General  Reason for Referral: Patient referred to OT for training on compensatory techniques to improve consistency of use and understanding of techniques.  Referred By: Dr. Herminia Angulo  Past Medical History Relevant to Rehab: TBI, bipolar disorder  Precautions:  Hearing/Visual Limitations: WFL hearing, wears glasses all the  time- vision WFL with glasses  Medical Precautions:  (low falls risk)  Pain Assessment:  Pain Assessment  Pain Assessment: 0-10  0-10 (Numeric) Pain Score: 0 - No pain    Objective      IADL's:   Neumind use- patient is checking Neumind 2x a day.  Patient indicates that he has been able to keep tasks entered to times he is not in other activities. He however is not checking reminders at the time they alert. Recommend addition of mid day check to allow patient to see his progress toward reminders for day and to bring increased awareness to what he still needs to complete.     Provided instruction on use of notes and journals. Encouraged patient to begin to use notes for things he has to remember that are not simply appointments or time based events and to use journal for gratitude.  Patient placed note in Neumind reminding him of steps to return to driving including practicing in empty parking lots with mom and asking Dr. Hurt or  Dr. Angulo for referral to OT 's assessment. Provided additional instruction on linking notes to reminders.            OP EDUCATION:  Patient instructed in use of journals, notes and linking of these to reminders.  Patient also instructed in recommendation for mid day Neumind check.       Goals:  Active       OT Problem       Patient will independently use compensatory tool for memory to allow recall of prospective tasks, appointments, tasks he needs to complete.       Start:  05/17/24    Expected End:  10/12/24            Patient will remember and initiate new/unfamiliar tasks and tasks that are not part of his daily routine independently using compensatory tools.       Start:  05/20/24    Expected End:  10/12/24

## 2024-07-25 ENCOUNTER — APPOINTMENT (OUTPATIENT)
Dept: BEHAVIORAL HEALTH | Facility: CLINIC | Age: 34
End: 2024-07-25
Payer: MEDICARE

## 2024-07-25 DIAGNOSIS — F31.9 BIPOLAR 1 DISORDER (MULTI): ICD-10-CM

## 2024-07-25 DIAGNOSIS — R41.89 COGNITIVE IMPAIRMENT: ICD-10-CM

## 2024-07-25 DIAGNOSIS — F90.0 ATTENTION DEFICIT HYPERACTIVITY DISORDER (ADHD), PREDOMINANTLY INATTENTIVE TYPE: ICD-10-CM

## 2024-07-25 PROCEDURE — 90834 PSYTX W PT 45 MINUTES: CPT | Performed by: PSYCHOLOGIST

## 2024-07-25 NOTE — PROGRESS NOTES
"8 AM [address] [facility] for ADHD, bipolar I, executive/cognitive dysfunction (45 MIN, [phone]). Virtual. Supportive Therapy. Mood stable.  Not exercising, walking some. Wants to be more consistent with exercise, make meal/week but difficulty mobilizing, operationalizing into schedule. Starts back to [facility] mid Aug. \"Struggling with dating\", talking to a few women online but not materializing into dates. Also, said \"superficial\" but focusing on weight. \"I'm lonely.\" Discussed consideration of trying to connect with others (e.g., meet up groups, reading club), other activities that focus is not on romantic relationship. Went to [place] for family vacation, good time. Next: 3 weeks.  "

## 2024-07-31 ENCOUNTER — OFFICE VISIT (OUTPATIENT)
Dept: PSYCHOLOGY | Facility: HOSPITAL | Age: 34
End: 2024-07-31
Payer: MEDICARE

## 2024-07-31 DIAGNOSIS — R41.3 MEMORY CHANGES: ICD-10-CM

## 2024-07-31 DIAGNOSIS — Z87.820 HISTORY OF TRAUMATIC BRAIN INJURY: ICD-10-CM

## 2024-07-31 DIAGNOSIS — R41.89 COGNITIVE IMPAIRMENT: Primary | ICD-10-CM

## 2024-07-31 DIAGNOSIS — R41.844 FRONTAL LOBE AND EXECUTIVE FUNCTION DEFICIT: ICD-10-CM

## 2024-07-31 PROCEDURE — 96168 HLTH BHV IVNTJ FAM EA ADDL: CPT | Performed by: PSYCHOLOGIST

## 2024-07-31 PROCEDURE — 96167 HLTH BHV IVNTJ FAM 1ST 30: CPT | Performed by: PSYCHOLOGIST

## 2024-07-31 NOTE — PROGRESS NOTES
"Neuropsychology Note    Name: Paige Ybarra    Date of Service: 07/31/2024     Reason For Visit: Neuropsychological Rehabilitation    Summary of Visit:  Paige Ybarra is being seen for neuropsychological rehabilitation of cognitive and emotional changes resulting from severe traumatic brain injury. His mother joined the session on the phone. Paige confessed that he had not looked at our session notes from last session until today, and therefore had not followed through with any of the tasks. We reviewed our list of independent living activities as follows:   - 4 pm rule: continues to comply with rule of no social media until 4 pm   - reading: reading for  and reading on his own 10 minutes a day   - jobs: attending [employer] and [employer]; [employer] has been off for the summer   - cleaning: says it is \"ok\" but mother has not been there to check for a while   - exercise: enjoying weekly tennis lessons; not running since he had 2 falls; is walking and doing some yoga but not as much as planned   - Music: attending [employer] lessons but not practicing daily; is considering dropping the lessons after next month   - Gratitudes: continues to write gratitudes in journal daily   - Food: big problem area; no cooking for weeks; says he is living off the frozen food in his freezer, but has not shopped for fresh food in weeks; no veg; no new recipes; mostly eating sandwiches. Had discussion of this - agrees to resume weekly food shopping and puts reminders in phone  Photos: not taking reminder photos    We discussed the fact that Paige kept up well with his activities while he had his  meeting with him to plan his weeks, but he has struggled to maintain them on his own with our now less frequent sessions. We agreed that he and his family will discuss options for hiring a '' or find someone who would resume the role held by his  on a more permanent basis.    Time: 3:02 " pm - 4:00 pm    Next Session:  08/28/2024

## 2024-08-06 ENCOUNTER — LAB (OUTPATIENT)
Dept: LAB | Facility: LAB | Age: 34
End: 2024-08-06
Payer: MEDICARE

## 2024-08-06 DIAGNOSIS — F31.70 BIPOLAR AFFECTIVE DISORDER IN REMISSION (MULTI): ICD-10-CM

## 2024-08-06 LAB
BASOPHILS # BLD AUTO: 0.04 X10*3/UL (ref 0–0.1)
BASOPHILS NFR BLD AUTO: 0.5 %
EOSINOPHIL # BLD AUTO: 0.17 X10*3/UL (ref 0–0.7)
EOSINOPHIL NFR BLD AUTO: 1.9 %
ERYTHROCYTE [DISTWIDTH] IN BLOOD BY AUTOMATED COUNT: 13.4 % (ref 11.5–14.5)
HCT VFR BLD AUTO: 40.6 % (ref 41–52)
HGB BLD-MCNC: 13.1 G/DL (ref 13.5–17.5)
IMM GRANULOCYTES # BLD AUTO: 0.02 X10*3/UL (ref 0–0.7)
IMM GRANULOCYTES NFR BLD AUTO: 0.2 % (ref 0–0.9)
LYMPHOCYTES # BLD AUTO: 2.16 X10*3/UL (ref 1.2–4.8)
LYMPHOCYTES NFR BLD AUTO: 24.4 %
MCH RBC QN AUTO: 28.8 PG (ref 26–34)
MCHC RBC AUTO-ENTMCNC: 32.3 G/DL (ref 32–36)
MCV RBC AUTO: 89 FL (ref 80–100)
MONOCYTES # BLD AUTO: 0.39 X10*3/UL (ref 0.1–1)
MONOCYTES NFR BLD AUTO: 4.4 %
NEUTROPHILS # BLD AUTO: 6.08 X10*3/UL (ref 1.2–7.7)
NEUTROPHILS NFR BLD AUTO: 68.6 %
NRBC BLD-RTO: 0 /100 WBCS (ref 0–0)
PLATELET # BLD AUTO: 229 X10*3/UL (ref 150–450)
RBC # BLD AUTO: 4.55 X10*6/UL (ref 4.5–5.9)
WBC # BLD AUTO: 8.9 X10*3/UL (ref 4.4–11.3)

## 2024-08-06 PROCEDURE — 85025 COMPLETE CBC W/AUTO DIFF WBC: CPT

## 2024-08-06 PROCEDURE — 36415 COLL VENOUS BLD VENIPUNCTURE: CPT

## 2024-08-07 ENCOUNTER — OFFICE VISIT (OUTPATIENT)
Dept: PSYCHOLOGY | Facility: CLINIC | Age: 34
End: 2024-08-07
Payer: MEDICARE

## 2024-08-07 DIAGNOSIS — Z87.820 HISTORY OF TRAUMATIC BRAIN INJURY: ICD-10-CM

## 2024-08-07 DIAGNOSIS — R41.3 MEMORY CHANGES: Primary | ICD-10-CM

## 2024-08-07 DIAGNOSIS — R41.844 FRONTAL LOBE AND EXECUTIVE FUNCTION DEFICIT: ICD-10-CM

## 2024-08-07 DIAGNOSIS — R41.840 ATTENTION AND CONCENTRATION DEFICIT: ICD-10-CM

## 2024-08-07 PROCEDURE — 96167 HLTH BHV IVNTJ FAM 1ST 30: CPT | Mod: AH

## 2024-08-07 PROCEDURE — 96167 HLTH BHV IVNTJ FAM 1ST 30: CPT

## 2024-08-07 PROCEDURE — 96168 HLTH BHV IVNTJ FAM EA ADDL: CPT | Mod: AH

## 2024-08-07 PROCEDURE — 96168 HLTH BHV IVNTJ FAM EA ADDL: CPT

## 2024-08-07 NOTE — PROGRESS NOTES
Name: Paige Ybarra   MRN: [id number]   Age: 34 y.o.   Date of Service: 8/7/2024    Chief Complaint:   Paige is being seen for neuropsychological rehabilitation for cognitive and emotional problems due to traumatic brain injury sustained in the context of bipolar disorder.     Session Details:   He arrived on time and accompanied to the appointment with his mother.     The topics for the session included completing a check-in, reviewing home practice, and discussing taking ownership of skills learned in rehab. During the check-in, he reported falling when running (no head injury). He plans to run on the treadmill, and he has started tennis lessons. The home practice was reviewed to keep consistent sleep schedule (not completed) and schedule driving evaluation (pending). Barriers were discussed, and he identified poor memory and being lazy. We discussed deeper reasons which included possible self-sabotage, frontal dysfunction, poor self-esteem, and loneliness. We discussed ways to take more control of his TBI recovery that incorporates these concepts. This includes emailing me Sunday about a time he made a decision confidently and solved a problem. He will use Neumind to remind him to complete the task. Notably, he continues to use [employer]; we discussed disadvantages to having two calendar systems. The session ended with him discussing his sexual fluidity.     Diagnosis:   1. Memory changes    2. Frontal lobe and executive function deficit    3. Attention and concentration deficit    4. History of traumatic brain injury      Plan:   The next in-person session will be scheduled on 09/18 at 3PM. For home practice, he will email me weekly about wins, discuss sexual fluidity with friend, and maintain consistent sleep schedule. The plan for the next session is to complete a check-in, review home practice, and discuss methods to use CogSMART strategies in everyday life.     Time:   4457-6884

## 2024-08-09 DIAGNOSIS — F31.70 BIPOLAR AFFECTIVE DISORDER IN REMISSION (MULTI): ICD-10-CM

## 2024-08-12 RX ORDER — CLOZAPINE 25 MG/1
25 TABLET ORAL 2 TIMES DAILY
Qty: 56 TABLET | Refills: 10 | Status: SHIPPED | OUTPATIENT
Start: 2024-08-12

## 2024-08-12 RX ORDER — CLOZAPINE 200 MG/1
TABLET ORAL
Qty: 84 TABLET | Refills: 10 | Status: SHIPPED | OUTPATIENT
Start: 2024-08-12

## 2024-08-15 ENCOUNTER — APPOINTMENT (OUTPATIENT)
Dept: BEHAVIORAL HEALTH | Facility: CLINIC | Age: 34
End: 2024-08-15
Payer: MEDICARE

## 2024-08-15 DIAGNOSIS — F90.0 ATTENTION DEFICIT HYPERACTIVITY DISORDER (ADHD), PREDOMINANTLY INATTENTIVE TYPE: ICD-10-CM

## 2024-08-15 DIAGNOSIS — R41.89 COGNITIVE IMPAIRMENT: ICD-10-CM

## 2024-08-15 DIAGNOSIS — F31.9 BIPOLAR 1 DISORDER (MULTI): ICD-10-CM

## 2024-08-15 PROCEDURE — 90834 PSYTX W PT 45 MINUTES: CPT | Performed by: PSYCHOLOGIST

## 2024-08-15 NOTE — PROGRESS NOTES
8 AM [address] [facility] Psych for ADHD, bipolar I, executive/cognitive dysfunction (45 MIN, [phone]). Virtual. Supportive Therapy.  Mood stable.  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  Patient continues to try online dating.  Indicated that he is going back and forth whether or not he should be dating.  Encouraged patient to also consider connecting with others outside of area of romantic relationships.  Encouraged consideration of support groups and activities where he can meet potential friends as opposed to just romantic interest.  Going to [employer] today and then feast [employer] in [facility].  Cooked a new male last night, [employer] check in.  Reported it was tasty and easy to prepare.  I sent in the recipe for healthy turkey meatloaf.  Continues to meet with his rehab psychologist.  Is taking tennis lessons and enjoying that.  Still trying to get more consistent with exercise.  Hopes to get to the gym more.  Ran a mile yesterday evening and then walked 3 miles.  Trying to think about what he wants to get his mother for her birthday in September, mother turned 60. Next: 2 weeks.

## 2024-08-16 ENCOUNTER — TREATMENT (OUTPATIENT)
Dept: OCCUPATIONAL THERAPY | Facility: HOSPITAL | Age: 34
End: 2024-08-16
Payer: MEDICARE

## 2024-08-16 DIAGNOSIS — R41.89 COGNITIVE IMPAIRMENT: Primary | ICD-10-CM

## 2024-08-16 DIAGNOSIS — R41.843 PSYCHOMOTOR DEFICIT: ICD-10-CM

## 2024-08-16 PROCEDURE — 97535 SELF CARE MNGMENT TRAINING: CPT | Mod: GO | Performed by: OCCUPATIONAL THERAPIST

## 2024-08-16 ASSESSMENT — ACTIVITIES OF DAILY LIVING (ADL): HOME_MANAGEMENT_TIME_ENTRY: 55

## 2024-08-16 NOTE — PROGRESS NOTES
"Occupational Therapy    Occupational Therapy Treatment    Name: Paige Ybarra  MRN: [id number]  : 1990  Date: 24    Time Entry:  Time Calculation  Start Time: 1006  Stop Time: 1101  Time Calculation (min): 55 min        OT Therapeutic Procedures Time Entry  Self Care/Home Management (ADLs) Time Entry: 55                Assessment:   Patient with impaired follow through of strategy use despite voicing desire to use Neumind as his memory strategy. Worked with patient to develop realistic SMART goal for strategy use, addressed barriers and completed brain storming/idea generation for possible solutions. Patient with some questions related to selection of reminder vs task.  Provided instruction on difference.  Patient continues to schedule reminders while he is outside of home. Recommend patient add hours of task to end of description so that he can see time lines/lengths. Patient is not initiating use of selected memory strategy independently. Built in additional reminders and scaffolding today in hopes of improving self initiation. Will continue OT tx to further improve independence and self initiation with use of compensatory tools.    Plan:  Onset Date: 24  Certification Period Start Date: 24  Certification Period End Date: 10/12/24  Number of Treatments Authorized: [place], today is visit 6    Subjective   General:  OT Last Visit  OT Received On: 24\"I'm trying to use neumind more than Google. I stated to check Neumind more than once a day today\".  Patient reports that he often picks the wrong mode of entry, selecting a task instead of a memory.  General  Reason for Referral: Patient referred to OT for training on compensatory techniques to improve consistency of use and understanding of techniques.  Referred By: Dr. Herminia Angulo  Past Medical History Relevant to Rehab: TBI, bipolar disorder  Precautions: low falls risk  Medical Precautions:  (low falls risk)  Pain " Assessment: 0/10       Objective       IADL's:   Provided instruction on difference between tasks and reminders.     Provided re-instruction on use of smart goals. Patient created goal to check Neumind 2-3 times per day, 3x/week.  He will measure success based on how often he logs into savage.     Patient participated in identification of barriers and development of plan to address barriers to Neumind use.    Barriers-   -forgetting to use neumind  -Entering of tasks at a time when he is somewhere else  -Inability to know how long a task takes in order to prevent scheduling something else at that time    Solutions-  - visual reminder, auditory reminder- set Neumind alert 2x a day in phone, patient required assist with problem solving  -Keep tracker on keyboard  -recommended he add time to entry such as OT 10-11  -OT set up 3 individual times in Neumind where patient will be prompted to email OT to help increase accountability with checking Neumind      Patient indicates that he is not completing some tasks due to inability to make a task turn from completed to incomplete in Neumind.  Provided instruction on how to make a task turn incomplete.    Patient returned memory questionnaire provided at a previous session. He identified difficulty with the following:  -Telling others the same thing twice- recommend taking notes  -Forgetting to tell somebody- put reminder in Neumind if there is something you need to tell someone  -forgetting where you put things  - having to go back and check if you did something you meant to do  -completely forgetting to do things you said you would do and planned to do- put these tasks in Neumind  - forgetting to tell someone something important- put these items in neumind as you identify something you need to tell someone  -telling someone a story or joke you told them once- note taking recommended  -forgetting where things are normally  -repeating to someone what you have just told them or  asking the same question twice- note taking recommended      OP EDUCATION: memory strategies/compensatory techniques       Goals:  Active       OT Problem       Patient will independently use compensatory tool for memory to allow recall of prospective tasks, appointments, tasks he needs to complete.       Start:  05/17/24    Expected End:  10/12/24            Patient will remember and initiate new/unfamiliar tasks and tasks that are not part of his daily routine independently using compensatory tools.       Start:  05/20/24    Expected End:  10/12/24

## 2024-08-28 ENCOUNTER — OFFICE VISIT (OUTPATIENT)
Dept: PSYCHOLOGY | Facility: HOSPITAL | Age: 34
End: 2024-08-28
Payer: MEDICARE

## 2024-08-28 DIAGNOSIS — R41.89 COGNITIVE IMPAIRMENT: Primary | ICD-10-CM

## 2024-08-28 DIAGNOSIS — Z87.820 HISTORY OF TRAUMATIC BRAIN INJURY: ICD-10-CM

## 2024-08-28 DIAGNOSIS — R41.844 FRONTAL LOBE AND EXECUTIVE FUNCTION DEFICIT: ICD-10-CM

## 2024-08-28 DIAGNOSIS — R41.3 MEMORY CHANGES: ICD-10-CM

## 2024-08-28 PROCEDURE — 96167 HLTH BHV IVNTJ FAM 1ST 30: CPT | Performed by: PSYCHOLOGIST

## 2024-08-28 PROCEDURE — 96158 HLTH BHV IVNTJ INDIV 1ST 30: CPT | Performed by: PSYCHOLOGIST

## 2024-08-28 NOTE — PROGRESS NOTES
Neuropsychology Note    Name: Paige Ybarra    Date of Service: 08/28/2024     Reason For Visit: Neuropsychological Rehabilitation    Summary of Visit:  Paige Ybarra is being seen for neuropsychological rehabilitation of cognitive and emotional changes resulting from severe traumatic brain injury in the context of bipolar disorder. His mother joined for the first half hour of the session. We discussed our plan of having a '' for Paige who can support him to initiate and plan and maintain his daily activities, given that his TBI has resulted in difficulties with initiation and maintenance of behavior. His mother has spoken to one person and is awaiting her decision. We also generated a few more ideas in case their first choice does not work out.  We then reviewed Paige's activities as follows:  4 pm rule: Paige is adhering to the rule  Exercise: Paige has not been consistently exercising; we considered use of a Choice Board to help him stay engaged; his mother will review it with him  Reading: Piage is reading a book with his  and is spending at least 10 minutes a day reading news [employer] lessons: Paige is not practicing and is not enjoying playing the [employer]. He is considering quitting the lessons, but then identified a couple of changes he could make that might increase his enjoyment in it  Cleaning: So so; he uses an savage as reminders of cleaning chores  Cooking: Paige has not cooked for a couple of weeks; we continue to discuss ways to support this. Today he said it is hard to cook on weeknights, so we considered alternatives such as cooking and prepping food on the weekend that can be eaten during the week  Jobs: all is going well and he is attending all three positions     Time: 3:02 pm - 4:00 pm    Next Session:  09/26/2024

## 2024-08-29 ENCOUNTER — APPOINTMENT (OUTPATIENT)
Dept: BEHAVIORAL HEALTH | Facility: CLINIC | Age: 34
End: 2024-08-29
Payer: MEDICARE

## 2024-08-29 DIAGNOSIS — R41.89 COGNITIVE IMPAIRMENT: ICD-10-CM

## 2024-08-29 DIAGNOSIS — F90.0 ATTENTION DEFICIT HYPERACTIVITY DISORDER (ADHD), PREDOMINANTLY INATTENTIVE TYPE: ICD-10-CM

## 2024-08-29 DIAGNOSIS — F31.9 BIPOLAR 1 DISORDER (MULTI): ICD-10-CM

## 2024-08-29 PROCEDURE — 90832 PSYTX W PT 30 MINUTES: CPT | Performed by: PSYCHOLOGIST

## 2024-08-29 NOTE — PROGRESS NOTES
8 AM [id number] Indiv Psych for ADHD, bipolar I, executive/cognitive dysfunction (30 MIN, [phone]). Virtual. Supportive Therapy.  Mood stable.  patient started back to [employer] last week.  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  Indicated that he is trying to run more consistently on the treadmill there.  He is considering getting a new computer.  His old system is outdated.  Also talked a little bit about dating on [employer] which has not materialized the results he had hoped for.  Going to visit his grandmother this weekend, possibly watch [employer] game. Next: 3 weeks.

## 2024-08-30 ENCOUNTER — TREATMENT (OUTPATIENT)
Dept: OCCUPATIONAL THERAPY | Facility: HOSPITAL | Age: 34
End: 2024-08-30
Payer: MEDICARE

## 2024-08-30 DIAGNOSIS — R41.89 COGNITIVE IMPAIRMENT: Primary | ICD-10-CM

## 2024-08-30 DIAGNOSIS — R41.3 MEMORY LOSS: ICD-10-CM

## 2024-08-30 PROCEDURE — 97535 SELF CARE MNGMENT TRAINING: CPT | Mod: GO | Performed by: OCCUPATIONAL THERAPIST

## 2024-08-30 ASSESSMENT — ACTIVITIES OF DAILY LIVING (ADL): HOME_MANAGEMENT_TIME_ENTRY: 56

## 2024-08-30 NOTE — PROGRESS NOTES
Occupational Therapy    Occupational Therapy Treatment    Name: Paige Ybarra  MRN: [id number]  : 1990  Date: 24    Time Entry:  Time Calculation  Start Time: 1004  Stop Time: 1100  Time Calculation (min): 56 min        OT Therapeutic Procedures Time Entry  Self Care/Home Management (ADLs) Time Entry: 56        Assessment:  With increased use of Neumind savage, patient is demonstrating improvement in her outcome measures and improved ability to compensate for memory. Focused on developing compensatory strategies for other memory deficits identified on Everyday memory questionnaire.  Patient receptive to instruction on strategies.  It appears that impaired attention is affecting memory.  Patient instructed in home program to improve attention and working memory.    Plan:  Onset Date: 24  Certification Period Start Date: 24  Certification Period End Date: 10/12/24  Number of Treatments Authorized: [place], today is visit 7Patient demonstrated improved insight into deficits and awareness of need for compensatory strategy use when completing Everyday Memory Questionnaire.      Subjective   General:  OT Last Visit  OT Received On: 24  Patient showed OT his Neumind tracking form. He has consistently checked Neumind 3 x a day missing only 1 time.  He is also entering additional reminders consistently. Patient feels that use of this technique is effective. He feels he is not missing anything memory wise.   Patient reported that mom and another person he is working with tell him that he has already told them something that he is trying to tell them.   General  Reason for Referral: Patient referred to OT for training on compensatory techniques to improve consistency of use and understanding of techniques.  Referred By: Dr. Herminia Angulo  Past Medical History Relevant to Rehab: TBI, bipolar disorder  Precautions:  Medical Precautions:  (low falls risk)  Pain Assessment: 0/10       Objective        IADL's:   Patient completed Everyday Memory Questionnaire.    He identified concern with:  Retelling stories/jokes, forgetting that he asked someone a question- recommend patient take notes,   patient plans to put conversations record in his phone, recommend using emoji or checkmark to indicate item completed instead of deleting them to allow him to recall what he has discussed.  Recommend he avoid doing 2 things at once    Following a tv show/remembering the plot- patient reports losing track of plot during a tv show- recommend patient start using Brain HQ to buiild attention and working memory  Avoid doing anything else while watching tv. Patient used Brain HQ to play attention game. He became distracted very quickly looking at his watch requiring cues from OT to attend to game and avoid watch. During training games, he looked at watch 2 times, and spoke to or looked at  OT or OT student 7 times in just a few minutes.     Patient requried assistance to enter notes summarizing home program and process for notetaking/marking off discussions.      Outcome Measures:  OT Adult Other Outcome Measures  Other Outcome Measures: Everyday memory questionnaire: 16 (improved from 22)    OP EDUCATION: memory compensatory strategies, use of Brain HQ to improve working memory and attention       Goals:  Active       OT Problem       Patient will independently use compensatory tool for memory to allow recall of prospective tasks, appointments, tasks he needs to complete.       Start:  05/17/24    Expected End:  10/12/24            Patient will remember and initiate new/unfamiliar tasks and tasks that are not part of his daily routine independently using compensatory tools.       Start:  05/20/24    Expected End:  10/12/24

## 2024-09-05 ENCOUNTER — LAB (OUTPATIENT)
Dept: LAB | Facility: LAB | Age: 34
End: 2024-09-05
Payer: MEDICARE

## 2024-09-05 DIAGNOSIS — F31.9 BIPOLAR 1 DISORDER (MULTI): ICD-10-CM

## 2024-09-05 LAB
BASOPHILS # BLD AUTO: 0.04 X10*3/UL (ref 0–0.1)
BASOPHILS NFR BLD AUTO: 0.5 %
EOSINOPHIL # BLD AUTO: 0.21 X10*3/UL (ref 0–0.7)
EOSINOPHIL NFR BLD AUTO: 2.5 %
ERYTHROCYTE [DISTWIDTH] IN BLOOD BY AUTOMATED COUNT: 12.8 % (ref 11.5–14.5)
HCT VFR BLD AUTO: 39.6 % (ref 41–52)
HGB BLD-MCNC: 12.7 G/DL (ref 13.5–17.5)
IMM GRANULOCYTES # BLD AUTO: 0.02 X10*3/UL (ref 0–0.7)
IMM GRANULOCYTES NFR BLD AUTO: 0.2 % (ref 0–0.9)
LYMPHOCYTES # BLD AUTO: 2.03 X10*3/UL (ref 1.2–4.8)
LYMPHOCYTES NFR BLD AUTO: 24.3 %
MCH RBC QN AUTO: 28.7 PG (ref 26–34)
MCHC RBC AUTO-ENTMCNC: 32.1 G/DL (ref 32–36)
MCV RBC AUTO: 89 FL (ref 80–100)
MONOCYTES # BLD AUTO: 0.36 X10*3/UL (ref 0.1–1)
MONOCYTES NFR BLD AUTO: 4.3 %
NEUTROPHILS # BLD AUTO: 5.71 X10*3/UL (ref 1.2–7.7)
NEUTROPHILS NFR BLD AUTO: 68.2 %
NRBC BLD-RTO: 0 /100 WBCS (ref 0–0)
PLATELET # BLD AUTO: 240 X10*3/UL (ref 150–450)
RBC # BLD AUTO: 4.43 X10*6/UL (ref 4.5–5.9)
WBC # BLD AUTO: 8.4 X10*3/UL (ref 4.4–11.3)

## 2024-09-05 PROCEDURE — 85025 COMPLETE CBC W/AUTO DIFF WBC: CPT

## 2024-09-05 PROCEDURE — 36415 COLL VENOUS BLD VENIPUNCTURE: CPT

## 2024-09-10 ENCOUNTER — TREATMENT (OUTPATIENT)
Dept: OCCUPATIONAL THERAPY | Facility: HOSPITAL | Age: 34
End: 2024-09-10
Payer: MEDICARE

## 2024-09-10 DIAGNOSIS — R41.89 COGNITIVE IMPAIRMENT: Primary | ICD-10-CM

## 2024-09-10 DIAGNOSIS — R41.3 MEMORY LOSS: ICD-10-CM

## 2024-09-10 PROCEDURE — 97535 SELF CARE MNGMENT TRAINING: CPT | Mod: GO | Performed by: OCCUPATIONAL THERAPIST

## 2024-09-10 ASSESSMENT — ACTIVITIES OF DAILY LIVING (ADL): HOME_MANAGEMENT_TIME_ENTRY: 60

## 2024-09-10 NOTE — PROGRESS NOTES
"Occupational Therapy    Occupational Therapy Treatment    Name: Paige Ybarra  MRN: [id number]  : 1990  Date: 09/10/24    Time Entry: 1455    Assessment: Pt. Is demonstrating improved ability to compensate for memory by utilizing Neumind savage setting reminders to check off. Pt. Was receptive to strategies and took notes in his notebook on the session. Pt. inattention is affecting memory and was instructed on using home program to improve working memory.     Plan:   Onset Date: 24  Certification Period Start Date: 24  Certification Period End Date: 10/12/24  Number of Treatments Authorized[place], today is visit 8  Patient demonstrated  awareness of need for compensatory strategy use when completing his daily tasks and using reminders in Neumind.      Subjective   General:  OT Last Visit  OT Received On: 09/10/24  General  Reason for Referral: Patient referred to OT for training on compensatory techniques to improve consistency of use and understanding of techniques.  Referred By: Dr. Herminia Angulo  Past Medical History Relevant to Rehab: TBI, bipolar disorder    Pt reported using memory compensatory strategies such as the notes savage in his phone. Categories are titled for referencing back to discussions with his mother and important people to remember.     Pt. Said \"Neumind is the go to for remembering\" and it has been the tool he has been utilizing the most for reminders.     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 Using this as an aide to remember times he left the house and where he went.  Precautions: medical precautions (low falls risk)     Pain Assessment: 0/10       Objective      IADL's:   Pt added new " reminders to [employer] savage     Pt. Reported using Brain HQ only once since the last visit, and was unable to recall the assignment on subsequent days. Pt used Brain HQ daily challenge to improve working memory and attention.. He met the goal and received 2 stars for completion. Pt. Added this daily task into the [employer] savage to remember to do this activity daily at 6PM.     Pt was given strategies to what to do if someone calls him, how will he know to look at his scheduling and set a reminder when he is on the phone.  -Pt. Was given ideas to let the call go to [employer] take notes, schedule it in [employer], then call them back.    Pt. Was unaware and was questioning the ability to delete reminders he no longer needs. Provided instruction on deleting reminders and was given approval to delete irrelevant reminders.     Pt. Completed check on strategy use on notes from previous conversations.  Pt. Is deleting conversations and notes he had previously, resulting in unable to reference back.     Recommended he keeps important conversations in notes to reference.    Pt. Is finding success in use of asterisk for signifying importance.    OP EDUCATION:   Pt.'s homework for the following days: to figure out how to change things and do it immediately.      Pt will be prompted with exercises for 3 days:  Wednesday: Tell us your favorite animal at the zoo at 3 pm  Thursday: Tell us what you are eating for lunch (lunch break)  Friday: will call and make a change    Goals:  Active       OT Problem       Patient will independently use compensatory tool for memory to allow recall of prospective tasks, appointments, tasks he needs to complete.       Start:  05/17/24    Expected End:  10/12/24            Patient will remember and initiate new/unfamiliar tasks and tasks that are not part of his daily routine independently using compensatory tools.       Start:  05/20/24    Expected End:  10/12/24                OT Student under direct  supervision by OTR/L

## 2024-09-12 ENCOUNTER — APPOINTMENT (OUTPATIENT)
Dept: BEHAVIORAL HEALTH | Facility: CLINIC | Age: 34
End: 2024-09-12
Payer: MEDICARE

## 2024-09-12 VITALS
SYSTOLIC BLOOD PRESSURE: 119 MMHG | BODY MASS INDEX: 27.4 KG/M2 | TEMPERATURE: 97.9 F | WEIGHT: 185 LBS | HEIGHT: 69 IN | HEART RATE: 92 BPM | DIASTOLIC BLOOD PRESSURE: 83 MMHG | OXYGEN SATURATION: 97 %

## 2024-09-12 DIAGNOSIS — R41.844 EXECUTIVE FUNCTION DEFICIT: ICD-10-CM

## 2024-09-12 DIAGNOSIS — F31.9 BIPOLAR 1 DISORDER (MULTI): ICD-10-CM

## 2024-09-12 DIAGNOSIS — R41.89 COGNITIVE IMPAIRMENT: ICD-10-CM

## 2024-09-12 DIAGNOSIS — F41.9 ANXIETY: ICD-10-CM

## 2024-09-12 PROCEDURE — 99213 OFFICE O/P EST LOW 20 MIN: CPT | Performed by: PSYCHIATRY & NEUROLOGY

## 2024-09-12 PROCEDURE — 3008F BODY MASS INDEX DOCD: CPT | Performed by: PSYCHIATRY & NEUROLOGY

## 2024-09-12 PROCEDURE — 3079F DIAST BP 80-89 MM HG: CPT | Performed by: PSYCHIATRY & NEUROLOGY

## 2024-09-12 PROCEDURE — 3074F SYST BP LT 130 MM HG: CPT | Performed by: PSYCHIATRY & NEUROLOGY

## 2024-09-12 NOTE — PATIENT INSTRUCTIONS
Plan:   - Continue current medications:   Buspirone 5mg in the morning, 10mg in the evening  Clozapine 225mg (200mg +25mg) in AM, 425mg (two 200mg + 25mg) at night  Lithium 450mg in the morning; 600mg in the evening.   Gabapentin 400mg tid.   Folic acid 1mg daily.  - May take melatonin 5 to 10mg only as needed for sleep.   - Monthly blood draws for CBC for clozapine.  - Continue cognitive rehab and psychotherapy.  - Continue occupational therapy.   - Follow up in 3 months.   - Contact via [employer] or call sooner ([phone]) in case of any questions or concerns.  - Call [phone] for mental health crisis or suicidal thoughts, or call 911 or go to the nearest emergency room for emergencies.     Brain-healthy lifestyle:        Eat heart-healthy diet       Regular physical activity        Regular mental exercise such as reading books, doing puzzles, playing cards/board games etc.        Stay socially engaged       Maintain good sleep hygiene and try to get 7 to 8 hours of continuous sleep at bedtime.
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
## 2024-09-13 ENCOUNTER — APPOINTMENT (OUTPATIENT)
Dept: OCCUPATIONAL THERAPY | Facility: HOSPITAL | Age: 34
End: 2024-09-13
Payer: MEDICARE

## 2024-09-18 ENCOUNTER — OFFICE VISIT (OUTPATIENT)
Dept: PSYCHOLOGY | Facility: CLINIC | Age: 34
End: 2024-09-18
Payer: MEDICARE

## 2024-09-18 DIAGNOSIS — R41.840 ATTENTION AND CONCENTRATION DEFICIT: ICD-10-CM

## 2024-09-18 DIAGNOSIS — Z87.820 HISTORY OF TRAUMATIC BRAIN INJURY: ICD-10-CM

## 2024-09-18 DIAGNOSIS — R41.844 FRONTAL LOBE AND EXECUTIVE FUNCTION DEFICIT: ICD-10-CM

## 2024-09-18 DIAGNOSIS — R41.3 MEMORY CHANGES: Primary | ICD-10-CM

## 2024-09-18 PROCEDURE — 96168 HLTH BHV IVNTJ FAM EA ADDL: CPT | Mod: AH

## 2024-09-18 PROCEDURE — 96168 HLTH BHV IVNTJ FAM EA ADDL: CPT

## 2024-09-18 PROCEDURE — 96167 HLTH BHV IVNTJ FAM 1ST 30: CPT | Mod: AH

## 2024-09-18 PROCEDURE — 96167 HLTH BHV IVNTJ FAM 1ST 30: CPT

## 2024-09-18 NOTE — PROGRESS NOTES
Name: Paige Ybarra   MRN: [id number]   Age: 34 y.o.   Date of Service: 9/18/2024    Chief Complaint:   Paige is being seen for neuropsychological rehabilitation for cognitive and emotional problems due to traumatic brain injury sustained in the context of bipolar disorder.     Session Details:   He arrived early and accompanied to the appointment with his mother.     The topics for the session included completing a check-in, reviewing home practice, and continuing to discuss ways to implement cognitive strategies. During the check-in, he reported an instance when he problem solved about getting to his appointment on the [facility]. He indicated he would use paratransit. The home practice was reviewed to email me weekly about wins (completed), discuss sexual fluidity with friend (not completed), and maintain consistent sleep schedule (completed). The majority of the session focused on ways to remember to implement CogSmart strategies. The plan is to email me using a GMF, as well as identify any cognitive strategies he identified by using his toolkit. We completed a practice email in session. Paige continues to discuss his desire to date. We talked about using a website to pair disabled individuals together. He talked about his reservations about it, so a resource was provided for dating a non-able bodied individual. Finally, we dicussed terminating treatment due to the main treatment goal being met to teach him cognitive compensatory strategies. We continue to work on implementing it in his daily life, so we will complete the weekly email check-in and a two month booster session.     Diagnosis:   1. Memory changes    2. Frontal lobe and executive function deficit    3. Attention and concentration deficit    4. History of traumatic brain injury      Plan:   The next in-person session will be scheduled on 11/13 at 3PM. For home practice, he will email me using GMF and research about dating others with  disabilities. The plan for the next session is to complete a check-in, review home practice, and complete booster session.     Time:    2613-3381

## 2024-09-19 ENCOUNTER — APPOINTMENT (OUTPATIENT)
Dept: BEHAVIORAL HEALTH | Facility: CLINIC | Age: 34
End: 2024-09-19
Payer: MEDICARE

## 2024-09-19 DIAGNOSIS — F90.0 ATTENTION DEFICIT HYPERACTIVITY DISORDER (ADHD), PREDOMINANTLY INATTENTIVE TYPE: ICD-10-CM

## 2024-09-19 DIAGNOSIS — R41.89 COGNITIVE IMPAIRMENT: ICD-10-CM

## 2024-09-19 DIAGNOSIS — F31.9 BIPOLAR 1 DISORDER (MULTI): ICD-10-CM

## 2024-09-19 PROCEDURE — 90834 PSYTX W PT 45 MINUTES: CPT | Performed by: PSYCHOLOGIST

## 2024-09-19 NOTE — PROGRESS NOTES
8 AM [id number] Ind Psych for ADHD, bipolar I, executive/cognitive dysfunction (30 MIN, [phone]). Virtual. Supportive Therapy.  Mood stable. Patient seemed a bit more sedated today.  Discussed some of his boredom with current [employer] volunteering.  Discussed recent discussions with his rehab psychologist and mother regarding getting on the single disabled dating site.  Has concerns about ended up dating someone in a wheelchair.  Encouraged to take it 1 step at a time and see what happens.  Has still not decided about whether or not he wants to pursue driving assessment.  Exercising some but not consistently. Next: 2 weeks.

## 2024-09-24 ENCOUNTER — TREATMENT (OUTPATIENT)
Dept: OCCUPATIONAL THERAPY | Facility: HOSPITAL | Age: 34
End: 2024-09-24
Payer: MEDICARE

## 2024-09-24 DIAGNOSIS — R41.89 COGNITIVE IMPAIRMENT: Primary | ICD-10-CM

## 2024-09-24 DIAGNOSIS — R41.3 MEMORY LOSS: ICD-10-CM

## 2024-09-24 PROCEDURE — 97535 SELF CARE MNGMENT TRAINING: CPT | Mod: GO | Performed by: OCCUPATIONAL THERAPIST

## 2024-09-24 ASSESSMENT — ACTIVITIES OF DAILY LIVING (ADL): HOME_MANAGEMENT_TIME_ENTRY: 58
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
## 2024-09-26 ENCOUNTER — OFFICE VISIT (OUTPATIENT)
Facility: CLINIC | Age: 34
End: 2024-09-26
Payer: MEDICARE

## 2024-09-26 DIAGNOSIS — R41.840 ATTENTION AND CONCENTRATION DEFICIT: ICD-10-CM

## 2024-09-26 DIAGNOSIS — R41.3 MEMORY CHANGES: Primary | ICD-10-CM

## 2024-09-26 DIAGNOSIS — R41.844 FRONTAL LOBE AND EXECUTIVE FUNCTION DEFICIT: ICD-10-CM

## 2024-09-26 DIAGNOSIS — Z87.820 HISTORY OF TRAUMATIC BRAIN INJURY: ICD-10-CM

## 2024-09-26 PROCEDURE — 96159 HLTH BHV IVNTJ INDIV EA ADDL: CPT | Performed by: PSYCHOLOGIST

## 2024-09-26 PROCEDURE — 96158 HLTH BHV IVNTJ INDIV 1ST 30: CPT | Performed by: PSYCHOLOGIST

## 2024-10-01 ENCOUNTER — LAB (OUTPATIENT)
Dept: LAB | Facility: LAB | Age: 34
End: 2024-10-01
Payer: COMMERCIAL

## 2024-10-01 DIAGNOSIS — F31.9 BIPOLAR 1 DISORDER (MULTI): ICD-10-CM

## 2024-10-01 LAB
BASOPHILS # BLD AUTO: 0.03 X10*3/UL (ref 0–0.1)
BASOPHILS NFR BLD AUTO: 0.3 %
EOSINOPHIL # BLD AUTO: 0.13 X10*3/UL (ref 0–0.7)
EOSINOPHIL NFR BLD AUTO: 1.2 %
ERYTHROCYTE [DISTWIDTH] IN BLOOD BY AUTOMATED COUNT: 12.7 % (ref 11.5–14.5)
HCT VFR BLD AUTO: 40.5 % (ref 41–52)
HGB BLD-MCNC: 12.8 G/DL (ref 13.5–17.5)
IMM GRANULOCYTES # BLD AUTO: 0.03 X10*3/UL (ref 0–0.7)
IMM GRANULOCYTES NFR BLD AUTO: 0.3 % (ref 0–0.9)
LYMPHOCYTES # BLD AUTO: 2 X10*3/UL (ref 1.2–4.8)
LYMPHOCYTES NFR BLD AUTO: 18.1 %
MCH RBC QN AUTO: 28.9 PG (ref 26–34)
MCHC RBC AUTO-ENTMCNC: 31.6 G/DL (ref 32–36)
MCV RBC AUTO: 91 FL (ref 80–100)
MONOCYTES # BLD AUTO: 0.47 X10*3/UL (ref 0.1–1)
MONOCYTES NFR BLD AUTO: 4.3 %
NEUTROPHILS # BLD AUTO: 8.36 X10*3/UL (ref 1.2–7.7)
NEUTROPHILS NFR BLD AUTO: 75.8 %
NRBC BLD-RTO: 0 /100 WBCS (ref 0–0)
PLATELET # BLD AUTO: 251 X10*3/UL (ref 150–450)
RBC # BLD AUTO: 4.43 X10*6/UL (ref 4.5–5.9)
WBC # BLD AUTO: 11 X10*3/UL (ref 4.4–11.3)

## 2024-10-01 PROCEDURE — 85025 COMPLETE CBC W/AUTO DIFF WBC: CPT

## 2024-10-03 ENCOUNTER — APPOINTMENT (OUTPATIENT)
Dept: BEHAVIORAL HEALTH | Facility: CLINIC | Age: 34
End: 2024-10-03
Payer: MEDICARE

## 2024-10-03 DIAGNOSIS — F31.9 BIPOLAR 1 DISORDER (MULTI): ICD-10-CM

## 2024-10-03 DIAGNOSIS — F90.0 ATTENTION DEFICIT HYPERACTIVITY DISORDER (ADHD), PREDOMINANTLY INATTENTIVE TYPE: ICD-10-CM

## 2024-10-03 DIAGNOSIS — R56.9 SEIZURE (MULTI): ICD-10-CM

## 2024-10-03 DIAGNOSIS — R41.89 COGNITIVE IMPAIRMENT: ICD-10-CM

## 2024-10-03 PROCEDURE — 90834 PSYTX W PT 45 MINUTES: CPT | Performed by: PSYCHOLOGIST

## 2024-10-03 NOTE — PROGRESS NOTES
" 8AM 47538 Indiv Psych for ADHD, bipolar I, executive/cognitive dysfunction (45 MIN, [phone]). Virtual. Supportive Therapy.  Mood stable.  Patient discussed the fact that he will have to go out to [place] to see his rehab psychologist.  He indicated that he like to get more confident in his decision making and independence.  He indicated that he missed a dose of medications Tuesday evening while watching the debate and fell asleep.  Often would report those situations to his mom.  \"Afraid of answering to mom.\"  However decided that he did not need to do this because he is responsible for his own situation.  Mention his goal of starting to run 3 days/week.  Does not feel that he can run safely outside because he is fell twice.  Will run on the treadmill.  Still on line dating sites. Next: 2 weeks.  "

## 2024-10-04 RX ORDER — LACOSAMIDE 150 MG/1
150 TABLET ORAL 2 TIMES DAILY
Qty: 60 TABLET | Refills: 5 | Status: SHIPPED | OUTPATIENT
Start: 2024-10-04 | End: 2025-04-02

## 2024-10-15 ENCOUNTER — OFFICE VISIT (OUTPATIENT)
Dept: NEUROLOGY | Facility: CLINIC | Age: 34
End: 2024-10-15
Payer: MEDICARE

## 2024-10-15 VITALS
WEIGHT: 182 LBS | SYSTOLIC BLOOD PRESSURE: 122 MMHG | RESPIRATION RATE: 18 BRPM | DIASTOLIC BLOOD PRESSURE: 82 MMHG | HEART RATE: 90 BPM | BODY MASS INDEX: 26.88 KG/M2

## 2024-10-15 DIAGNOSIS — R56.9 SEIZURE (MULTI): Primary | ICD-10-CM

## 2024-10-15 PROCEDURE — 3079F DIAST BP 80-89 MM HG: CPT | Performed by: PSYCHIATRY & NEUROLOGY

## 2024-10-15 PROCEDURE — 3074F SYST BP LT 130 MM HG: CPT | Performed by: PSYCHIATRY & NEUROLOGY

## 2024-10-15 PROCEDURE — 99215 OFFICE O/P EST HI 40 MIN: CPT | Performed by: PSYCHIATRY & NEUROLOGY

## 2024-10-15 PROCEDURE — 1036F TOBACCO NON-USER: CPT | Performed by: PSYCHIATRY & NEUROLOGY

## 2024-10-15 ASSESSMENT — PAIN SCALES - GENERAL: PAINLEVEL: 0-NO PAIN
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
## 2024-10-17 ENCOUNTER — APPOINTMENT (OUTPATIENT)
Dept: BEHAVIORAL HEALTH | Facility: CLINIC | Age: 34
End: 2024-10-17
Payer: MEDICARE

## 2024-10-17 DIAGNOSIS — F31.9 BIPOLAR 1 DISORDER (MULTI): ICD-10-CM

## 2024-10-17 DIAGNOSIS — F90.0 ATTENTION DEFICIT HYPERACTIVITY DISORDER (ADHD), PREDOMINANTLY INATTENTIVE TYPE: ICD-10-CM

## 2024-10-17 DIAGNOSIS — R41.89 COGNITIVE IMPAIRMENT: ICD-10-CM

## 2024-10-17 PROCEDURE — 90834 PSYTX W PT 45 MINUTES: CPT | Performed by: PSYCHOLOGIST

## 2024-10-18 ENCOUNTER — APPOINTMENT (OUTPATIENT)
Dept: PRIMARY CARE | Facility: CLINIC | Age: 34
End: 2024-10-18
Payer: MEDICARE

## 2024-10-18 VITALS
HEIGHT: 69 IN | HEART RATE: 106 BPM | OXYGEN SATURATION: 99 % | BODY MASS INDEX: 26.66 KG/M2 | WEIGHT: 180 LBS | DIASTOLIC BLOOD PRESSURE: 80 MMHG | SYSTOLIC BLOOD PRESSURE: 114 MMHG

## 2024-10-18 DIAGNOSIS — L05.91 PILONIDAL CYST: ICD-10-CM

## 2024-10-18 DIAGNOSIS — F41.9 ANXIETY: ICD-10-CM

## 2024-10-18 DIAGNOSIS — E78.5 DYSLIPIDEMIA: ICD-10-CM

## 2024-10-18 DIAGNOSIS — R56.9 SEIZURE (MULTI): ICD-10-CM

## 2024-10-18 DIAGNOSIS — F31.9 BIPOLAR AFFECTIVE DISORDER, REMISSION STATUS UNSPECIFIED (MULTI): ICD-10-CM

## 2024-10-18 DIAGNOSIS — R23.4 ESCHAR OF TOE: ICD-10-CM

## 2024-10-18 DIAGNOSIS — F90.0 ADHD, PREDOMINANTLY INATTENTIVE TYPE: ICD-10-CM

## 2024-10-18 DIAGNOSIS — S06.9X9D TRAUMATIC BRAIN INJURY WITH LOSS OF CONSCIOUSNESS, SUBSEQUENT ENCOUNTER: ICD-10-CM

## 2024-10-18 DIAGNOSIS — I10 BENIGN ESSENTIAL HYPERTENSION: Primary | ICD-10-CM

## 2024-10-18 PROCEDURE — 3079F DIAST BP 80-89 MM HG: CPT | Performed by: INTERNAL MEDICINE

## 2024-10-18 PROCEDURE — 3074F SYST BP LT 130 MM HG: CPT | Performed by: INTERNAL MEDICINE

## 2024-10-18 PROCEDURE — 3008F BODY MASS INDEX DOCD: CPT | Performed by: INTERNAL MEDICINE

## 2024-10-18 PROCEDURE — 99214 OFFICE O/P EST MOD 30 MIN: CPT | Performed by: INTERNAL MEDICINE

## 2024-10-18 RX ORDER — BUSPIRONE HYDROCHLORIDE 5 MG/1
5 TABLET ORAL 2 TIMES DAILY
Qty: 84 TABLET | Refills: 10 | Status: SHIPPED | OUTPATIENT
Start: 2024-10-18 | End: 2024-10-18 | Stop reason: WASHOUT

## 2024-10-18 RX ORDER — CLINDAMYCIN PHOSPHATE 11.9 MG/ML
SOLUTION TOPICAL 2 TIMES DAILY
Qty: 30 ML | Refills: 1 | Status: SHIPPED | OUTPATIENT
Start: 2024-10-18 | End: 2024-11-17

## 2024-10-18 ASSESSMENT — PATIENT HEALTH QUESTIONNAIRE - PHQ9
SUM OF ALL RESPONSES TO PHQ9 QUESTIONS 1 AND 2: 0
2. FEELING DOWN, DEPRESSED OR HOPELESS: NOT AT ALL
1. LITTLE INTEREST OR PLEASURE IN DOING THINGS: NOT AT ALL

## 2024-10-18 ASSESSMENT — LIFESTYLE VARIABLES
SKIP TO QUESTIONS 9-10: 1
HOW OFTEN DO YOU HAVE SIX OR MORE DRINKS ON ONE OCCASION: NEVER
AUDIT-C TOTAL SCORE: 0
HOW MANY STANDARD DRINKS CONTAINING ALCOHOL DO YOU HAVE ON A TYPICAL DAY: PATIENT DOES NOT DRINK
HOW OFTEN DO YOU HAVE A DRINK CONTAINING ALCOHOL: NEVER

## 2024-10-18 ASSESSMENT — COLUMBIA-SUICIDE SEVERITY RATING SCALE - C-SSRS
6. HAVE YOU EVER DONE ANYTHING, STARTED TO DO ANYTHING, OR PREPARED TO DO ANYTHING TO END YOUR LIFE?: NO
2. HAVE YOU ACTUALLY HAD ANY THOUGHTS OF KILLING YOURSELF?: NO
1. IN THE PAST MONTH, HAVE YOU WISHED YOU WERE DEAD OR WISHED YOU COULD GO TO SLEEP AND NOT WAKE UP?: NO

## 2024-10-18 ASSESSMENT — ENCOUNTER SYMPTOMS
DEPRESSION: 0
LOSS OF SENSATION IN FEET: 0
OCCASIONAL FEELINGS OF UNSTEADINESS: 0

## 2024-10-18 ASSESSMENT — PAIN SCALES - GENERAL: PAINLEVEL_OUTOF10: 0-NO PAIN

## 2024-10-18 NOTE — ASSESSMENT & PLAN NOTE
Patient will continue to follow-up with behavioral health and will continue Clozaril and gabapentin.

## 2024-10-18 NOTE — ASSESSMENT & PLAN NOTE
He will continue Clozaril, gabapentin and lithium regarding history of bipolar disorder he will also continue to follow-up with behavioral health.  He will return to clinic in 3 months for follow-up visit.

## 2024-10-18 NOTE — ASSESSMENT & PLAN NOTE
He is advised to continue to follow-up with neuropsychology regarding traumatic brain injury.

## 2024-10-18 NOTE — ASSESSMENT & PLAN NOTE
He will continue to take his Vimpat regarding seizure disorder and follow-up with neurology clinic

## 2024-10-18 NOTE — ASSESSMENT & PLAN NOTE
He is currently not taking any antihypertensive medication and will monitor his blood pressure.

## 2024-10-18 NOTE — PROGRESS NOTES
"Subjective   Patient ID: Paige Ybarra is a 34 y.o. male who presents for New Patient Visit ([facility]).    HPI patient presents to clinic to [facility].  He had been a former patient of Dr. Snow .  He is concerned about pilonidal cyst noticed on the right side of the buttock for the past several years and is also  complaining of sore on his right great toe for the past several months.  His symptoms are associated with clear drainage noticed from the pilonidal cyst on intermittent basis.  He denies any fever, erythema, tenderness and palpitation.  Laboratory studies done recently showed hemoglobin of 12.8 and other laboratory studies have been reviewed and discussed with him.  He follows up with behavioral health and neuropsychology regarding history of traumatic brain injury and bipolar disorder.  Past medical history significant for traumatic brain injury, bipolar disorder, hypertension,epilepsey,dyslipidemia,vitamin d deficiency,suicide attempt in 2017,cognitive impairment cerumen impactionp   Past surgical history significant for left Ankle surgery secondary to severe sprain,    Review of Systems   Constitutional: Negative.    HENT: Negative.     Eyes: Negative.    Respiratory: Negative.     Cardiovascular: Negative.    Gastrointestinal: Negative.    Endocrine: Negative.    Genitourinary: Negative.         With right  rectal pain   Musculoskeletal: Negative.    Skin:  Positive for rash.   Allergic/Immunologic: Negative.    Neurological: Negative.    Hematological: Negative.    Psychiatric/Behavioral: Negative.         Objective   /80 (BP Location: Right arm, Patient Position: Sitting)   Pulse 106   Ht 1.753 m (5' 9\")   Wt 81.6 kg (180 lb)   SpO2 99%   BMI 26.58 kg/m²     Physical Exam  Constitutional:       Appearance: Normal appearance. He is normal weight.   HENT:      Right Ear: Tympanic membrane normal.      Left Ear: Tympanic membrane and ear canal normal.      Nose: Nose normal. "   Neck:      Vascular: No carotid bruit.   Cardiovascular:      Rate and Rhythm: Normal rate.   Pulmonary:      Effort: No respiratory distress.      Breath sounds: No stridor. No wheezing.   Abdominal:      Palpations: Abdomen is soft.      Tenderness: There is no abdominal tenderness. There is no guarding or rebound.   Genitourinary:     Comments: Right buttock with pilonidal cyst without any drainage or erythema and swelling.  Musculoskeletal:      Comments: Chronic excoriation on the right great toe with eschar   Skin:     Coloration: Skin is not jaundiced.      Findings: No bruising.   Neurological:      General: No focal deficit present.      Mental Status: He is alert and oriented to person, place, and time.   Psychiatric:         Mood and Affect: Mood normal.         Assessment/Plan   Assessment & Plan  Anxiety  Patient will continue to follow-up with behavioral health and will continue Clozaril and gabapentin.       Seizure (Multi)  He will continue to take his Vimpat regarding seizure disorder and follow-up with neurology clinic       Benign essential hypertension  He is currently not taking any antihypertensive medication and will monitor his blood pressure.       ADHD, predominantly inattentive type  He will continue to follow-up with neuropsychology       Traumatic brain injury with loss of consciousness, subsequent encounter  He is advised to continue to follow-up with neuropsychology regarding traumatic brain injury.       Dyslipidemia  He is advised to follow low-cholesterol diet and will check lipid panel.  Orders:    Lipid Panel; Future    Pilonidal cyst  He will be referred to general surgery regarding pilonidal cyst drainage.  Orders:    Referral to General Surgery; Future    Eschar of toe  He will be referred to podiatry clinic regarding eschar of the right great toe and is also given trial with clindamycin solution.  Orders:    clindamycin (Cleocin T) 1 % external solution; Apply topically 2  times a day. apply to affected area    Referral to Podiatry; Future    Bipolar affective disorder, remission status unspecified (Multi)  He will continue Clozaril, gabapentin and lithium regarding history of bipolar disorder he will also continue to follow-up with behavioral health.  He will return to clinic in 3 months for follow-up visit.

## 2024-10-23 ASSESSMENT — ENCOUNTER SYMPTOMS
RESPIRATORY NEGATIVE: 1
EYES NEGATIVE: 1
NEUROLOGICAL NEGATIVE: 1
CONSTITUTIONAL NEGATIVE: 1
MUSCULOSKELETAL NEGATIVE: 1
GASTROINTESTINAL NEGATIVE: 1
CARDIOVASCULAR NEGATIVE: 1
HEMATOLOGIC/LYMPHATIC NEGATIVE: 1
PSYCHIATRIC NEGATIVE: 1
ALLERGIC/IMMUNOLOGIC NEGATIVE: 1
ENDOCRINE NEGATIVE: 1

## 2024-10-29 ENCOUNTER — LAB (OUTPATIENT)
Dept: LAB | Facility: LAB | Age: 34
End: 2024-10-29
Payer: MEDICARE

## 2024-10-29 DIAGNOSIS — R56.9 SEIZURE (MULTI): ICD-10-CM

## 2024-10-29 DIAGNOSIS — F31.9 BIPOLAR 1 DISORDER (MULTI): ICD-10-CM

## 2024-10-29 LAB
BASOPHILS # BLD AUTO: 0.04 X10*3/UL (ref 0–0.1)
BASOPHILS NFR BLD AUTO: 0.4 %
EOSINOPHIL # BLD AUTO: 0.18 X10*3/UL (ref 0–0.7)
EOSINOPHIL NFR BLD AUTO: 1.9 %
ERYTHROCYTE [DISTWIDTH] IN BLOOD BY AUTOMATED COUNT: 13 % (ref 11.5–14.5)
HCT VFR BLD AUTO: 43.4 % (ref 41–52)
HGB BLD-MCNC: 13.9 G/DL (ref 13.5–17.5)
IMM GRANULOCYTES # BLD AUTO: 0.03 X10*3/UL (ref 0–0.7)
IMM GRANULOCYTES NFR BLD AUTO: 0.3 % (ref 0–0.9)
LYMPHOCYTES # BLD AUTO: 2.26 X10*3/UL (ref 1.2–4.8)
LYMPHOCYTES NFR BLD AUTO: 24 %
MCH RBC QN AUTO: 29 PG (ref 26–34)
MCHC RBC AUTO-ENTMCNC: 32 G/DL (ref 32–36)
MCV RBC AUTO: 91 FL (ref 80–100)
MONOCYTES # BLD AUTO: 0.5 X10*3/UL (ref 0.1–1)
MONOCYTES NFR BLD AUTO: 5.3 %
NEUTROPHILS # BLD AUTO: 6.39 X10*3/UL (ref 1.2–7.7)
NEUTROPHILS NFR BLD AUTO: 68.1 %
NRBC BLD-RTO: 0 /100 WBCS (ref 0–0)
PLATELET # BLD AUTO: 233 X10*3/UL (ref 150–450)
RBC # BLD AUTO: 4.79 X10*6/UL (ref 4.5–5.9)
WBC # BLD AUTO: 9.4 X10*3/UL (ref 4.4–11.3)

## 2024-10-29 PROCEDURE — 36415 COLL VENOUS BLD VENIPUNCTURE: CPT

## 2024-10-29 PROCEDURE — 85025 COMPLETE CBC W/AUTO DIFF WBC: CPT

## 2024-10-29 PROCEDURE — 80235 DRUG ASSAY LACOSAMIDE: CPT

## 2024-10-31 ENCOUNTER — APPOINTMENT (OUTPATIENT)
Dept: BEHAVIORAL HEALTH | Facility: CLINIC | Age: 34
End: 2024-10-31
Payer: MEDICARE

## 2024-10-31 ENCOUNTER — OFFICE VISIT (OUTPATIENT)
Facility: CLINIC | Age: 34
End: 2024-10-31
Payer: MEDICARE

## 2024-10-31 DIAGNOSIS — F31.9 BIPOLAR 1 DISORDER (MULTI): ICD-10-CM

## 2024-10-31 DIAGNOSIS — F09 COGNITIVE DYSFUNCTION: ICD-10-CM

## 2024-10-31 DIAGNOSIS — R41.844 FRONTAL LOBE AND EXECUTIVE FUNCTION DEFICIT: Primary | ICD-10-CM

## 2024-10-31 DIAGNOSIS — R41.3 MEMORY CHANGES: ICD-10-CM

## 2024-10-31 DIAGNOSIS — Z87.820 HISTORY OF TRAUMATIC BRAIN INJURY: ICD-10-CM

## 2024-10-31 DIAGNOSIS — F90.0 ATTENTION DEFICIT HYPERACTIVITY DISORDER (ADHD), PREDOMINANTLY INATTENTIVE TYPE: ICD-10-CM

## 2024-10-31 LAB — LACOSAMIDE SERPL-MCNC: 9.5 UG/ML (ref 1–10)

## 2024-10-31 PROCEDURE — 90834 PSYTX W PT 45 MINUTES: CPT | Performed by: PSYCHOLOGIST

## 2024-10-31 PROCEDURE — 96167 HLTH BHV IVNTJ FAM 1ST 30: CPT | Performed by: PSYCHOLOGIST

## 2024-10-31 PROCEDURE — 96168 HLTH BHV IVNTJ FAM EA ADDL: CPT | Performed by: PSYCHOLOGIST

## 2024-11-05 ENCOUNTER — TELEMEDICINE CLINICAL SUPPORT (OUTPATIENT)
Dept: OCCUPATIONAL THERAPY | Facility: HOSPITAL | Age: 34
End: 2024-11-05
Payer: MEDICARE

## 2024-11-05 DIAGNOSIS — R41.89 COGNITIVE IMPAIRMENT: ICD-10-CM

## 2024-11-05 DIAGNOSIS — R41.3 MEMORY LOSS: ICD-10-CM

## 2024-11-05 PROCEDURE — 97535 SELF CARE MNGMENT TRAINING: CPT | Mod: GO,95 | Performed by: OCCUPATIONAL THERAPIST

## 2024-11-05 ASSESSMENT — ACTIVITIES OF DAILY LIVING (ADL): HOME_MANAGEMENT_TIME_ENTRY: 46

## 2024-11-05 NOTE — PROGRESS NOTES
"Occupational Therapy    Patient Name:Paige Ybarra  MRN:[id number]  Today's Date:2024  Referred by: Herminia Angulo  Time Calculation  Start Time: 1709  Stop Time: 1755  Time Calculation (min): 46 min    Therapy Diagnosis  Problem List Items Addressed This Visit             ICD-10-CM    Cognitive impairment R41.89    Memory loss R41.3       Insurance  Visit number: 10  Approved number of visits: MN  Auth required: No  Authorization date range: N/A  Onset Date: 2024  Medicare Certification Period:  Beginnin/5/24          Endin25  Payor: MEDICARE / Plan: MEDICARE PART A AND B / Product Type: *No Product type* /     Precautions/Fall Risk:  Fall Risk: Low based on  STEADI  Pacemaker: no    Seizures: No        Subjective/Pain:  \"Dr. Angulo would like us to work together on the cooking\"stated by patient.    Patient concerned with difficulty changing appointments in Neumind. He has been struggling and feels frustrated. He has been checking Neumind at 7pm and needs to move it to 5pm.  He also needs to put in running 3x a week and cleaning on the other days.   Current Pain Level (0-10): 0    HEP Compliance: Good    Objective/Outcome Measures:     Treatment  ADL/IADL:   minutes  Patient has 5 reminders set for 5:00 pm, moving check in would make 6.  Patient elected to combine these reminders into one task. Recommended patient link a note.   Patient required cues to change tasks that he did not complete.  He required max assist to edit time and dates of reminders. Patient identified plan to work around using delete and entering a new prompt with correct time/day.  Patient added extra reminder to check neumind at 5:00.     Patient reporting difficulty with cleaning. Provided instruction on use of brain dump to identify tasks needing to be done and allowing identification of steps.  Patient completed brain dump on office cleaning.    Home program/[employer]: use brain dump before completing " cleaning tasks, track alerts in Neumind to ensure that they are now at correct and convenient times.    Assessment:   Patient has experienced decline in ability to manage home making and meal prep at home since his aide was eliminated. Plan of care extended and updated to address these deficits. Patient using Neumind more as compensatory tool, but struggling when changes need to be made. Continue to address use of Neumind as a compensatory tools and further improve patient's independent with this tool of his choice.  Plan of care extended additional 12 weeks for goals as noted below.     Plan for next session:  Begin instruction on meal planning.    Goals:     Patient will independently use compensatory tool for memory to allow recall of prospective tasks, appointments, tasks he needs to complete.  Goal status: progressing    Patient will remember and initiate new/unfamiliar tasks and tasks that are not part of his daily routine independently using compensatory tools.  Goal status: progressing    New goals added:  Patient will demonstrate independent meal planning, shopping, and meal preparation using compensatory tools/techniques.    Patient will perform grocery shopping activities independently and consistently using cognitive compensatory techniques.    Patient will demonstrate ability to plan meals using My plate or Eat the [place] tools 75% of the time.          Patient will complete basic home making tasks independently using     compensatory cognitive tools/techniques to allow living independently.

## 2024-11-08 ENCOUNTER — OFFICE VISIT (OUTPATIENT)
Dept: SURGERY | Facility: CLINIC | Age: 34
End: 2024-11-08
Payer: MEDICARE

## 2024-11-08 VITALS
HEIGHT: 69 IN | BODY MASS INDEX: 27.19 KG/M2 | DIASTOLIC BLOOD PRESSURE: 82 MMHG | WEIGHT: 183.6 LBS | HEART RATE: 82 BPM | SYSTOLIC BLOOD PRESSURE: 120 MMHG

## 2024-11-08 DIAGNOSIS — L05.91 PILONIDAL CYST: ICD-10-CM

## 2024-11-08 PROCEDURE — 99213 OFFICE O/P EST LOW 20 MIN: CPT | Performed by: SURGERY

## 2024-11-08 PROCEDURE — 1036F TOBACCO NON-USER: CPT | Performed by: SURGERY

## 2024-11-08 PROCEDURE — 3008F BODY MASS INDEX DOCD: CPT | Performed by: SURGERY

## 2024-11-08 PROCEDURE — 99203 OFFICE O/P NEW LOW 30 MIN: CPT | Performed by: SURGERY

## 2024-11-08 PROCEDURE — 3074F SYST BP LT 130 MM HG: CPT | Performed by: SURGERY

## 2024-11-08 PROCEDURE — 3079F DIAST BP 80-89 MM HG: CPT | Performed by: SURGERY

## 2024-11-08 RX ORDER — DOXYCYCLINE 100 MG/1
100 CAPSULE ORAL 2 TIMES DAILY
Qty: 20 CAPSULE | Refills: 0 | Status: SHIPPED | OUTPATIENT
Start: 2024-11-08 | End: 2024-11-18

## 2024-11-08 ASSESSMENT — PAIN SCALES - GENERAL: PAINLEVEL_OUTOF10: 6
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
## 2024-11-13 ENCOUNTER — OFFICE VISIT (OUTPATIENT)
Dept: PSYCHOLOGY | Facility: CLINIC | Age: 34
End: 2024-11-13
Payer: MEDICARE

## 2024-11-13 DIAGNOSIS — R41.3 MEMORY CHANGES: Primary | ICD-10-CM

## 2024-11-13 DIAGNOSIS — Z87.820 HISTORY OF TRAUMATIC BRAIN INJURY: ICD-10-CM

## 2024-11-13 DIAGNOSIS — R41.844 FRONTAL LOBE AND EXECUTIVE FUNCTION DEFICIT: ICD-10-CM

## 2024-11-13 DIAGNOSIS — R41.840 ATTENTION AND CONCENTRATION DEFICIT: ICD-10-CM

## 2024-11-13 PROCEDURE — 96167 HLTH BHV IVNTJ FAM 1ST 30: CPT | Mod: AH

## 2024-11-13 PROCEDURE — 96167 HLTH BHV IVNTJ FAM 1ST 30: CPT

## 2024-11-13 PROCEDURE — 96168 HLTH BHV IVNTJ FAM EA ADDL: CPT | Mod: AH

## 2024-11-13 PROCEDURE — 96168 HLTH BHV IVNTJ FAM EA ADDL: CPT

## 2024-11-13 NOTE — PROGRESS NOTES
Name: Paige Ybarra   MRN: [id number]   Age: 34 y.o.   Date of Service: 11/13/2024    Chief Complaint:   Paige is being seen for neuropsychological rehabilitation for cognitive and emotional problems due to traumatic brain injury sustained in the context of bipolar disorder.     Session Details:   He arrived early and accompanied to the appointment with his mother.     The topics for the session included completing a check-in, reviewing home practice, and discussing ways to continue with GMF to build confidence in his decision making process. During the check-in, he reported deferring pilonidal surgery per medical recommendation. However, he is having foot pain and plans to schedule with a pediatrist. The home practice was completed to email me using GMF and research about dating others with disabilities. He indicated GMF has been helpful and implementing the solution he selected. Notably, he signed up for a dating website for those with disabilities. We discussed sending more personalized messages to try to get to know others better. The remainder of the session focused on us discussing using the GMF in his daily life. He indicated needing to rely on others to discuss a problem. We discussed getting greater independence in decision making by completing GMF before discussing it with a loved one.     Diagnosis:   1. Memory changes    2. Frontal lobe and executive function deficit    3. Attention and concentration deficit    4. History of traumatic brain injury      Plan:   Treatment has been completed. We reviewed CogSMART strategies, as well as ways to implement the strategies in daily life with the help of his mother. He will continue his work with neuropsychological rehabilitation, OT, psychiatry, and psychology. He was informed to reach out if he needed further assistance from me; he verbalized his understanding.     Time:   2133-2757

## 2024-11-14 ENCOUNTER — APPOINTMENT (OUTPATIENT)
Dept: BEHAVIORAL HEALTH | Facility: CLINIC | Age: 34
End: 2024-11-14
Payer: MEDICARE

## 2024-11-14 DIAGNOSIS — F90.0 ATTENTION DEFICIT HYPERACTIVITY DISORDER (ADHD), PREDOMINANTLY INATTENTIVE TYPE: ICD-10-CM

## 2024-11-14 DIAGNOSIS — R41.89 COGNITIVE IMPAIRMENT: ICD-10-CM

## 2024-11-14 DIAGNOSIS — F31.9 BIPOLAR 1 DISORDER (MULTI): ICD-10-CM

## 2024-11-14 PROCEDURE — 90834 PSYTX W PT 45 MINUTES: CPT | Performed by: PSYCHOLOGIST

## 2024-11-14 NOTE — PROGRESS NOTES
8AM [address] [facility] Psych for ADHD, bipolar I, executive/cognitive dysfunction (45 MIN, [phone]). Virtual. Supportive Therapy.  Mood stable.  Patient reported that he will no longer see Dr. Hurt but will continue with Dr. Ponce.  He has questions regarding dating and is currently on for dating sites.  We discussed the importance of having realistic goals for this.  I have also encouraged him to focus more on building relationships/friendships and finding activities in that regard as opposed to focusing on dating.  He will be spending time with his family at his mother's for [employer].  Schedule winding down at [facility], has about another month and then will be on break.  Continues to get to [place] in the food pantry.  Is not exercising routinely. Next: 3 weeks.

## 2024-11-19 ENCOUNTER — TREATMENT (OUTPATIENT)
Dept: OCCUPATIONAL THERAPY | Facility: HOSPITAL | Age: 34
End: 2024-11-19
Payer: MEDICARE

## 2024-11-19 DIAGNOSIS — R41.89 COGNITIVE IMPAIRMENT: Primary | ICD-10-CM

## 2024-11-19 DIAGNOSIS — F31.70 BIPOLAR AFFECTIVE DISORDER IN REMISSION (MULTI): ICD-10-CM

## 2024-11-19 DIAGNOSIS — R41.3 MEMORY LOSS: ICD-10-CM

## 2024-11-19 PROCEDURE — 97535 SELF CARE MNGMENT TRAINING: CPT | Mod: GO | Performed by: OCCUPATIONAL THERAPIST

## 2024-11-19 RX ORDER — LITHIUM CARBONATE 450 MG/1
450 TABLET ORAL EVERY MORNING
Qty: 30 TABLET | Refills: 5 | Status: SHIPPED | OUTPATIENT
Start: 2024-11-19 | End: 2025-05-18

## 2024-11-19 ASSESSMENT — ACTIVITIES OF DAILY LIVING (ADL): HOME_MANAGEMENT_TIME_ENTRY: 73

## 2024-11-19 NOTE — PROGRESS NOTES
Occupational Therapy    Patient Name:Paige Ybarra  MRN:[id number]  Today's Date:2024  Referred by: Herminia Angulo  Time Calculation  Start Time: 1343  Stop Time: 1456  Time Calculation (min): 73 min    Therapy Diagnosis  Problem List Items Addressed This Visit             ICD-10-CM    Cognitive impairment - Primary R41.89    Memory loss R41.3       Insurance  Visit number: 11  Approved number of visits: MN  Auth required: No  Authorization date range:   Onset Date: 2024  Medicare Certification Period:  Beginnin/5/24         Endin25  Payor: MEDICARE / Plan: MEDICARE PART A AND B / Product Type: *No Product type* /     Precautions/Fall Risk:  Fall Risk: Low based on  STEADI  Pacemaker: no    Seizures: No      Subjective/Pain:  Current Pain Level (0-10): 0    HEP Compliance: Good    Objective/Outcome Measures:     Treatment  ADL/IADL:   minutes  Provided instruction on eat the rainbow, and health benefits of each color.  Provided patient with handout. Determined tracking system and established smart goal to increase vegetable/fruit intake.  Patient identified smart goal of 1 vegetable per day to start.     He is currently skipping breakfast, eating lunch at [employer] and skipping dinner. Set smart goal for eating at least 2 meals a day. Patient created tracking goal for breakfast in habit tracker.     Provided instruction on meal planning and creation of shopping list.       Home program/NetDevices:   Track vegetable and fruit intake  Track breakfast meals    Assessment:     Patient continues to use Neumind. He is feeling better about his abilities. Patient with poor awareness of healthy diet and is often eating only one meal a day. He was receptive to instruction on healthy meal strategies and to instruction on goal tracking and smart goal use to increase these areas.     Plan for next session:    Continue to address meal planning, healthy eating and use of compensatory tools/techniques.

## 2024-11-21 ENCOUNTER — OFFICE VISIT (OUTPATIENT)
Facility: CLINIC | Age: 34
End: 2024-11-21
Payer: MEDICARE

## 2024-11-21 DIAGNOSIS — R41.844 FRONTAL LOBE AND EXECUTIVE FUNCTION DEFICIT: ICD-10-CM

## 2024-11-21 DIAGNOSIS — Z87.820 HISTORY OF TRAUMATIC BRAIN INJURY: ICD-10-CM

## 2024-11-21 DIAGNOSIS — R41.3 MEMORY CHANGES: ICD-10-CM

## 2024-11-21 DIAGNOSIS — R41.89 COGNITIVE IMPAIRMENT: Primary | ICD-10-CM

## 2024-11-21 PROCEDURE — 96158 HLTH BHV IVNTJ INDIV 1ST 30: CPT | Performed by: PSYCHOLOGIST

## 2024-11-21 PROCEDURE — 96159 HLTH BHV IVNTJ INDIV EA ADDL: CPT | Performed by: PSYCHOLOGIST

## 2024-11-21 NOTE — PROGRESS NOTES
Neuropsychology Note    Name: Paige Ybarra    Date of Service: 11/21/2024     Reason For Visit: Neuropsychological Rehabilitation    Summary of Visit: Paige Ybarra is being seen for neuropsychological rehabilitation of cognitive and emotional changes resulting from severe traumatic brain injury. We reviewed his progress in the following areas:   Reading with both his  and 10 minutes a day; work; caring for his plants - all satisfactory   4 pm rule: fair   Meal prep/healthy eating: Paige completed 1/4 meal prep with photo; is referred to OT for further support   Exercise: Paige is engaging in a variety of activities including tennis, yoga, walking and running. However he is not reaching his goal of running 3/week.   Checking notes: goal not met    We discussed how to increase the scaffolding to allow him to reach all of his goals. He did not write a job description for a  because he did not checks his notes that had the reminder, so he wrote it in again. Paige said he actually thinks he may not want a PA, so we talked through the rationale for it. I agreed that if he reaches his exercise and healthy eating goals for 3 months, we can reconsider this decision. We reviewed his Tool Kit from last year and saw that he has made considerable progress and has maintained many of his goals for living independently.    Time: 4:01 pm - 5:00 pm    Next Session:  12/19/2024

## 2024-11-26 ENCOUNTER — LAB (OUTPATIENT)
Dept: LAB | Facility: LAB | Age: 34
End: 2024-11-26
Payer: MEDICARE

## 2024-11-26 DIAGNOSIS — F31.9 BIPOLAR 1 DISORDER (MULTI): ICD-10-CM

## 2024-11-26 LAB
BASOPHILS # BLD AUTO: 0.04 X10*3/UL (ref 0–0.1)
BASOPHILS NFR BLD AUTO: 0.4 %
EOSINOPHIL # BLD AUTO: 0.17 X10*3/UL (ref 0–0.7)
EOSINOPHIL NFR BLD AUTO: 1.9 %
ERYTHROCYTE [DISTWIDTH] IN BLOOD BY AUTOMATED COUNT: 12.8 % (ref 11.5–14.5)
HCT VFR BLD AUTO: 41.3 % (ref 41–52)
HGB BLD-MCNC: 12.8 G/DL (ref 13.5–17.5)
IMM GRANULOCYTES # BLD AUTO: 0.02 X10*3/UL (ref 0–0.7)
IMM GRANULOCYTES NFR BLD AUTO: 0.2 % (ref 0–0.9)
LYMPHOCYTES # BLD AUTO: 2.25 X10*3/UL (ref 1.2–4.8)
LYMPHOCYTES NFR BLD AUTO: 25.3 %
MCH RBC QN AUTO: 29.4 PG (ref 26–34)
MCHC RBC AUTO-ENTMCNC: 31 G/DL (ref 32–36)
MCV RBC AUTO: 95 FL (ref 80–100)
MONOCYTES # BLD AUTO: 0.5 X10*3/UL (ref 0.1–1)
MONOCYTES NFR BLD AUTO: 5.6 %
NEUTROPHILS # BLD AUTO: 5.91 X10*3/UL (ref 1.2–7.7)
NEUTROPHILS NFR BLD AUTO: 66.6 %
NRBC BLD-RTO: 0 /100 WBCS (ref 0–0)
PLATELET # BLD AUTO: 227 X10*3/UL (ref 150–450)
RBC # BLD AUTO: 4.36 X10*6/UL (ref 4.5–5.9)
WBC # BLD AUTO: 8.9 X10*3/UL (ref 4.4–11.3)

## 2024-11-26 PROCEDURE — 85025 COMPLETE CBC W/AUTO DIFF WBC: CPT

## 2024-11-26 PROCEDURE — 36415 COLL VENOUS BLD VENIPUNCTURE: CPT

## 2024-12-05 ENCOUNTER — APPOINTMENT (OUTPATIENT)
Dept: BEHAVIORAL HEALTH | Facility: CLINIC | Age: 34
End: 2024-12-05
Payer: COMMERCIAL

## 2024-12-05 DIAGNOSIS — F90.0 ATTENTION DEFICIT HYPERACTIVITY DISORDER (ADHD), PREDOMINANTLY INATTENTIVE TYPE: ICD-10-CM

## 2024-12-05 DIAGNOSIS — F31.9 BIPOLAR 1 DISORDER (MULTI): ICD-10-CM

## 2024-12-05 DIAGNOSIS — R41.89 COGNITIVE IMPAIRMENT: ICD-10-CM

## 2024-12-05 PROCEDURE — 90834 PSYTX W PT 45 MINUTES: CPT | Performed by: PSYCHOLOGIST

## 2024-12-05 NOTE — PROGRESS NOTES
8AM [address] [facility] for ADHD, bipolar I, executive/cognitive dysfunction (45 MIN, [phone]). Virtual. Supportive Therapy.  Weather cold and snow at this morning.  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  Is bothered by the fact that he ask others the right thing to do.  Wants to be better at being okay with the decisions he makes on his own.  Hopes to get some laundry done today as well as some reading.  Currently reading Sherif and Masoud, a historical fiction about [place]ómez Che[place].  Has elected to get a bowling night for gifts for his family for [place].  Had a nice time for Thanksgiving at a relatives. Next: 2 weeks.

## 2024-12-08 DIAGNOSIS — F31.70 BIPOLAR AFFECTIVE DISORDER IN REMISSION (MULTI): ICD-10-CM

## 2024-12-08 RX ORDER — CLOZAPINE 200 MG/1
TABLET ORAL
Qty: 90 TABLET | Refills: 0 | Status: SHIPPED | OUTPATIENT
Start: 2024-12-08

## 2024-12-08 RX ORDER — CLOZAPINE 25 MG/1
25 TABLET ORAL 2 TIMES DAILY
Qty: 60 TABLET | Refills: 0 | Status: SHIPPED | OUTPATIENT
Start: 2024-12-08

## 2024-12-10 ENCOUNTER — TREATMENT (OUTPATIENT)
Dept: OCCUPATIONAL THERAPY | Facility: HOSPITAL | Age: 34
End: 2024-12-10
Payer: MEDICARE

## 2024-12-10 DIAGNOSIS — R41.3 MEMORY LOSS: ICD-10-CM

## 2024-12-10 DIAGNOSIS — R41.89 COGNITIVE IMPAIRMENT: Primary | ICD-10-CM

## 2024-12-10 PROCEDURE — 97535 SELF CARE MNGMENT TRAINING: CPT | Mod: GO | Performed by: OCCUPATIONAL THERAPIST

## 2024-12-10 ASSESSMENT — ACTIVITIES OF DAILY LIVING (ADL): HOME_MANAGEMENT_TIME_ENTRY: 64

## 2024-12-10 NOTE — PROGRESS NOTES
"Occupational Therapy    Patient Name:Paige Ybarra  MRN:[id number]  Today's Date:12/10/2024  Referred by: Herminia Angulo  Time Calculation  Start Time: 957  Stop Time:   Time Calculation (min): 64 min    Therapy Diagnosis  Problem List Items Addressed This Visit             ICD-10-CM    Cognitive impairment - Primary R41.89    Memory loss R41.3       Insurance  Visit number: 12  Approved number of visits: MN  Auth required: No  Authorization date range: N/A  Onset Date: 2024  Medicare Certification Period:  Beginnin/5/24          Endin25  Payor: MEDICARE / Plan: MEDICARE PART A AND B / Product Type: *No Product type* /     Precautions/Fall Risk:  Fall Risk: Low based on  STEADI  Pacemaker: no    Seizures: No        Subjective/Pain:  Current Pain Level (0-10): 0    HEP Compliance:  Patient reports, \"that didn't happen\", when asked about home program.      Objective/Outcome Measures:     Treatment  ADL/IADL: 64 minutes  Patient asked to identify cause of breakdown with home program. He indicates memory.  When asked how he could remedy this, he identified putting reminder in Neumind. Patient placed daily reminder in Neumind for counting daily fruits and vegetables and to eat and track breakfast intake.     Recommended patient sit down to plan meals each week. Provided patient with weekly planner. Patient identified the appointments/obligations he has for the week to help identify what meals he needs to prepare.  He was able to identify no need to create lunch on 3 days due to going to [employer]. He then participated in brain storming of menu ideas.  Patient had difficulty with thought generation to identify meals. Provided patient with neurodivergent friendly cookbook to pursue to get ideas. Patient required modeling and verbal cues to complete brain storming After identifying 2 meals for week, patient created a shopping list and identified ingredients/supplies he already has using " weekly meal planner.      Home program/[employer]:   Prepare ramen and chili.  Track veggies and fruit, track breakfast meals consumed. Handouts on weekly meal planner and brain storming tool provided to patient.     Assessment:     Patient with much difficulty planning meals, use of weekly planner and brain storming tools allowed patient to determine how many meals he needs to prepare, how many days a meal would last, and to create a grocery list.  Patient indicates feeling overwhelmed by meal prep and enjoyed the required brain power indicator in the neuro divergent cookbook.  Patient continues to require occasional verbal cues to ensure that he enters memory task into his memory and planning system.   Continued OT indicated.     Plan for next session:  continue to address meal prep, meal planning, healthy eating, and memory strategies

## 2024-12-12 ENCOUNTER — APPOINTMENT (OUTPATIENT)
Dept: BEHAVIORAL HEALTH | Facility: CLINIC | Age: 34
End: 2024-12-12
Payer: MEDICARE

## 2024-12-12 ENCOUNTER — TELEPHONE (OUTPATIENT)
Dept: BEHAVIORAL HEALTH | Facility: CLINIC | Age: 34
End: 2024-12-12

## 2024-12-12 VITALS
HEART RATE: 89 BPM | HEIGHT: 69 IN | DIASTOLIC BLOOD PRESSURE: 87 MMHG | WEIGHT: 185.6 LBS | SYSTOLIC BLOOD PRESSURE: 124 MMHG | BODY MASS INDEX: 27.49 KG/M2

## 2024-12-12 DIAGNOSIS — F31.9 BIPOLAR 1 DISORDER (MULTI): ICD-10-CM

## 2024-12-12 DIAGNOSIS — R41.89 COGNITIVE IMPAIRMENT: ICD-10-CM

## 2024-12-12 DIAGNOSIS — R41.844 EXECUTIVE FUNCTION DEFICIT: ICD-10-CM

## 2024-12-12 PROCEDURE — 3074F SYST BP LT 130 MM HG: CPT | Performed by: PSYCHIATRY & NEUROLOGY

## 2024-12-12 PROCEDURE — 99213 OFFICE O/P EST LOW 20 MIN: CPT | Performed by: PSYCHIATRY & NEUROLOGY

## 2024-12-12 PROCEDURE — 3079F DIAST BP 80-89 MM HG: CPT | Performed by: PSYCHIATRY & NEUROLOGY

## 2024-12-12 PROCEDURE — 3008F BODY MASS INDEX DOCD: CPT | Performed by: PSYCHIATRY & NEUROLOGY

## 2024-12-12 NOTE — PROGRESS NOTES
I saw and evaluated the patient. I personally obtained the key and critical portions of the history and physical exam or was physically present for key and critical portions performed by the resident/fellow. I reviewed the resident/fellow's documentation and discussed the patient with the resident/fellow. I agree with the resident/fellow's medical decision making as documented in the note.    Alee Nobles MD.
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
## 2024-12-13 ENCOUNTER — OFFICE VISIT (OUTPATIENT)
Dept: SURGERY | Facility: CLINIC | Age: 34
End: 2024-12-13
Payer: MEDICARE

## 2024-12-13 VITALS
HEART RATE: 85 BPM | BODY MASS INDEX: 27.4 KG/M2 | DIASTOLIC BLOOD PRESSURE: 92 MMHG | HEIGHT: 69 IN | WEIGHT: 185 LBS | SYSTOLIC BLOOD PRESSURE: 130 MMHG

## 2024-12-13 DIAGNOSIS — L05.91 PILONIDAL CYST: Primary | ICD-10-CM

## 2024-12-13 PROCEDURE — 99213 OFFICE O/P EST LOW 20 MIN: CPT | Performed by: SURGERY

## 2024-12-13 PROCEDURE — 3075F SYST BP GE 130 - 139MM HG: CPT | Performed by: SURGERY

## 2024-12-13 PROCEDURE — 3080F DIAST BP >= 90 MM HG: CPT | Performed by: SURGERY

## 2024-12-13 PROCEDURE — 3008F BODY MASS INDEX DOCD: CPT | Performed by: SURGERY

## 2024-12-13 ASSESSMENT — ENCOUNTER SYMPTOMS
CONSTIPATION: 0
DEPRESSION: 0
DIARRHEA: 0
SHORTNESS OF BREATH: 0
FEVER: 0
VOMITING: 0
FATIGUE: 0
NAUSEA: 0
CHILLS: 0

## 2024-12-13 ASSESSMENT — PAIN SCALES - GENERAL: PAINLEVEL_OUTOF10: 4
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
## 2024-12-19 ENCOUNTER — OFFICE VISIT (OUTPATIENT)
Facility: CLINIC | Age: 34
End: 2024-12-19
Payer: MEDICARE

## 2024-12-19 ENCOUNTER — APPOINTMENT (OUTPATIENT)
Dept: BEHAVIORAL HEALTH | Facility: CLINIC | Age: 34
End: 2024-12-19
Payer: MEDICARE

## 2024-12-19 ENCOUNTER — LAB (OUTPATIENT)
Dept: LAB | Facility: LAB | Age: 34
End: 2024-12-19
Payer: MEDICARE

## 2024-12-19 DIAGNOSIS — R41.89 COGNITIVE IMPAIRMENT: Primary | ICD-10-CM

## 2024-12-19 DIAGNOSIS — R41.89 COGNITIVE IMPAIRMENT: ICD-10-CM

## 2024-12-19 DIAGNOSIS — R41.844 FRONTAL LOBE AND EXECUTIVE FUNCTION DEFICIT: ICD-10-CM

## 2024-12-19 DIAGNOSIS — F31.9 BIPOLAR 1 DISORDER (MULTI): ICD-10-CM

## 2024-12-19 DIAGNOSIS — F90.0 ATTENTION DEFICIT HYPERACTIVITY DISORDER (ADHD), PREDOMINANTLY INATTENTIVE TYPE: ICD-10-CM

## 2024-12-19 DIAGNOSIS — R41.3 MEMORY CHANGES: ICD-10-CM

## 2024-12-19 DIAGNOSIS — Z87.820 HISTORY OF TRAUMATIC BRAIN INJURY: ICD-10-CM

## 2024-12-19 PROCEDURE — 96167 HLTH BHV IVNTJ FAM 1ST 30: CPT | Performed by: PSYCHOLOGIST

## 2024-12-19 PROCEDURE — 85025 COMPLETE CBC W/AUTO DIFF WBC: CPT

## 2024-12-19 PROCEDURE — 96168 HLTH BHV IVNTJ FAM EA ADDL: CPT | Performed by: PSYCHOLOGIST

## 2024-12-19 PROCEDURE — 36415 COLL VENOUS BLD VENIPUNCTURE: CPT

## 2024-12-19 PROCEDURE — 90834 PSYTX W PT 45 MINUTES: CPT | Performed by: PSYCHOLOGIST

## 2024-12-19 NOTE — PROGRESS NOTES
8AM 39938 Indiv Psych for ADHD, bipolar I, executive/cognitive dysfunction (45 MIN, [phone]). Virtual. Supportive Therapy.  Patient is on break from [employer].  Went out on a date from someone he met on Jehovah's witness match recently and plans on going out again this weekend.  He reported the person is a  and a [employer]hov[employer]'s witness school in [place].  He attended a support group in [facility] and plans on attending again.  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  Will be having a cyst removed at [facility] on January 14.  Getting together with family over the holidays and will go on his bowling night, but gift he is giving to his family members.  Has not been exercising routinely. Next: 2 weeks.

## 2024-12-19 NOTE — PROGRESS NOTES
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 His mother joined the session remotely today. Paige said that he has not written the job description for a  because he does not see the need for one. We then spent time talking about the role of PA for him. He was uncertain what the PA would do, so I said that the PA would help him meet his goals by holding him accountable for the steps in the plans he has for each goal. I reminded him that this is what I have been doing with him in our monthly reviews, but that it is time to move to a community-based model rather than continuing to have reviews via a doctor's visit. I asked him to consider the pros and cons for a PA, which he did, and he then concluded that a PA did make sense after all. We all agreed the next step is more him to write a job description, and we made an outline here. We agreed that he will review the job description with his sister over the holidays to see if she identifies further information that should be included. Also, Paige has not been reviewing his notes or attending to several of his prompts, so his mother agreed to give him some nudges to do this during the holidays.   Otherwise, Paige showed me the Neuro[employer]iver[employer] Cookbook that his OT found for him, reported that he was invited to an event on [facility] by Al Detal, a peer group for people with brain injuries, and has a cyst removal surgery planned for mid-January.    Time: 4:01 pm - 5:00 pm    Next Session:  01/23/2025

## 2024-12-20 ENCOUNTER — TREATMENT (OUTPATIENT)
Dept: OCCUPATIONAL THERAPY | Facility: HOSPITAL | Age: 34
End: 2024-12-20
Payer: MEDICARE

## 2024-12-20 DIAGNOSIS — R41.3 MEMORY LOSS: ICD-10-CM

## 2024-12-20 DIAGNOSIS — R41.89 COGNITIVE IMPAIRMENT: Primary | ICD-10-CM

## 2024-12-20 LAB
BASOPHILS # BLD AUTO: 0.06 X10*3/UL (ref 0–0.1)
BASOPHILS NFR BLD AUTO: 0.6 %
EOSINOPHIL # BLD AUTO: 0.13 X10*3/UL (ref 0–0.7)
EOSINOPHIL NFR BLD AUTO: 1.2 %
ERYTHROCYTE [DISTWIDTH] IN BLOOD BY AUTOMATED COUNT: 12.9 % (ref 11.5–14.5)
HCT VFR BLD AUTO: 42.4 % (ref 41–52)
HGB BLD-MCNC: 12.7 G/DL (ref 13.5–17.5)
IMM GRANULOCYTES # BLD AUTO: 0.04 X10*3/UL (ref 0–0.7)
IMM GRANULOCYTES NFR BLD AUTO: 0.4 % (ref 0–0.9)
LYMPHOCYTES # BLD AUTO: 2.62 X10*3/UL (ref 1.2–4.8)
LYMPHOCYTES NFR BLD AUTO: 24.6 %
MCH RBC QN AUTO: 28.2 PG (ref 26–34)
MCHC RBC AUTO-ENTMCNC: 30 G/DL (ref 32–36)
MCV RBC AUTO: 94 FL (ref 80–100)
MONOCYTES # BLD AUTO: 0.44 X10*3/UL (ref 0.1–1)
MONOCYTES NFR BLD AUTO: 4.1 %
NEUTROPHILS # BLD AUTO: 7.36 X10*3/UL (ref 1.2–7.7)
NEUTROPHILS NFR BLD AUTO: 69.1 %
NRBC BLD-RTO: 0 /100 WBCS (ref 0–0)
PLATELET # BLD AUTO: 249 X10*3/UL (ref 150–450)
RBC # BLD AUTO: 4.5 X10*6/UL (ref 4.5–5.9)
WBC # BLD AUTO: 10.7 X10*3/UL (ref 4.4–11.3)

## 2024-12-20 PROCEDURE — 97535 SELF CARE MNGMENT TRAINING: CPT | Mod: GO | Performed by: OCCUPATIONAL THERAPIST

## 2024-12-20 ASSESSMENT — ACTIVITIES OF DAILY LIVING (ADL): HOME_MANAGEMENT_TIME_ENTRY: 53

## 2024-12-20 NOTE — PROGRESS NOTES
Occupational Therapy    Patient Name:Paige Ybarra  MRN:[id number]  Today's Date:2024  Referred by: Herminia Angulo  Time Calculation  Start Time: 901  Stop Time: 954  Time Calculation (min): 53 min    Therapy Diagnosis  Problem List Items Addressed This Visit             ICD-10-CM    Cognitive impairment - Primary R41.89    Memory loss R41.3       Insurance  Visit number: 13  Approved number of visits: MN  Auth required: No  Authorization date range: N/A  Onset Date: 2024  Medicare Certification Period:  Beginnin/5/24            Endin25  Payor: MEDICARE / Plan: MEDICARE PART A AND B / Product Type: *No Product type* /     Precautions/Fall Risk:  Fall Risk: Low based on  STEADI  Pacemaker: no    Seizures: No      Subjective/Pain:  Patient did not make recipes but did purchase cook book.  He did tracking and was successful in eating vegetables/fruit on 9 days,  and ate breakfast 9 days.    Current Pain Level (0-10): 0    HEP Compliance: Fair    Objective/Outcome Measures:     Treatment  ADL/IADL: 53 minutes  Patient filled out task initiation self assessment. Score 13/28 indicating difficulty with initiation and completion of tasks.    Provided instruction on use of Procrastination station self initiation technique.  Patient indicates that barrier to completing cooking/shopping .   Tasks not completed in the past week: cooking  Distractions/reasons for not cooking:   takes time  Tv   Don't have all ingredients  Afraid I'm going to mess it up  SMART goal:    S: cook  M: 1 meal  A:  yes, attainable  R: yes, realistic  T: once a week  Plan:  Avoid going into living room  Pick out a recipe from cookbook  Start with a 1-2 luis miguel power recipe  Write down ingredients and tools  Make sure you have ingredients  Go buy missing ingredients or switch recipes  Lay out ingredients  Begin to cook  Suggested patient identify and day and time to complete this task. Patient selected a Monday or Wednesday  since he is home early from food or Tuesday.    Patient prompted to identify a day/time.  He choose to cook on Jan 6 @ 3:00.  Patient placed reminder in [employer] with cue. Patient required questioning prompt to identify need to put reminder for choosing recipe and shopping into [employer] ahead of the 6th. Patient put reminders on the 5th.     Also provided instruction on use of reward as a self motivation tool. Patient liked idea but had difficulty coming up with a reward. He identified reward of watching something on tv.        Home program/[employer]: use smart goal/procrastination station and other initiation tools to help with initiation and follow through of cooking/meal prep    Assessment:   Patient demonstrating impaired self initiation and follow through affecting ability to meet his goal of independent meal preparation and planning. He was receptive to instruction on strategies, but demonstrated decreased awareness of need for auditory reminder vs just taking notes.  Patient may need additional support if initiation strategies do not work.     Plan for next session:  continue to address memory strategies, initiation techniques, follow through techniques

## 2024-12-22 DIAGNOSIS — R23.4 ESCHAR OF TOE: ICD-10-CM

## 2024-12-23 RX ORDER — CLINDAMYCIN PHOSPHATE 11.9 MG/ML
SOLUTION TOPICAL 2 TIMES DAILY
Qty: 30 ML | Refills: 1 | OUTPATIENT
Start: 2024-12-23 | End: 2025-01-22

## 2024-12-27 ENCOUNTER — TELEPHONE (OUTPATIENT)
Dept: BEHAVIORAL HEALTH | Facility: CLINIC | Age: 34
End: 2024-12-27
Payer: MEDICARE

## 2025-01-02 ENCOUNTER — APPOINTMENT (OUTPATIENT)
Dept: BEHAVIORAL HEALTH | Facility: CLINIC | Age: 35
End: 2025-01-02
Payer: COMMERCIAL

## 2025-01-02 DIAGNOSIS — F90.0 ATTENTION DEFICIT HYPERACTIVITY DISORDER (ADHD), PREDOMINANTLY INATTENTIVE TYPE: ICD-10-CM

## 2025-01-02 DIAGNOSIS — F31.9 BIPOLAR 1 DISORDER (MULTI): ICD-10-CM

## 2025-01-02 DIAGNOSIS — R41.89 COGNITIVE IMPAIRMENT: ICD-10-CM

## 2025-01-02 PROCEDURE — 90834 PSYTX W PT 45 MINUTES: CPT | Performed by: PSYCHOLOGIST

## 2025-01-02 NOTE — PROGRESS NOTES
8AM 35292 Indiv Psych for ADHD, bipolar I, executive/cognitive dysfunction (45 MIN, [phone]). Virtual. Supportive Therapy. Mood stable.  Patient had a good holiday season with his family.  His bowling night out was a success.  His schedule has been slower since he has been off that [employer]/  He will be having a cyst removed on January 17 and continue to be off until then.  Going back to the [employer] on Monday.  Is already back to [employer].  Discussed dating briefly.  He was not able to go out a second time with a woman that he met online. Next: 2 weeks.

## 2025-01-03 DIAGNOSIS — F31.70 BIPOLAR AFFECTIVE DISORDER IN REMISSION (MULTI): ICD-10-CM

## 2025-01-03 DIAGNOSIS — R56.9 SEIZURE (MULTI): ICD-10-CM

## 2025-01-03 RX ORDER — LITHIUM CARBONATE 300 MG/1
600 TABLET, FILM COATED, EXTENDED RELEASE ORAL NIGHTLY
Qty: 56 TABLET | Refills: 10 | Status: SHIPPED | OUTPATIENT
Start: 2025-01-03

## 2025-01-03 RX ORDER — LITHIUM CARBONATE 450 MG/1
450 TABLET ORAL EVERY MORNING
Qty: 30 TABLET | Refills: 10 | Status: SHIPPED | OUTPATIENT
Start: 2025-01-03 | End: 2025-07-02

## 2025-01-06 DIAGNOSIS — R56.9 SEIZURE (MULTI): ICD-10-CM

## 2025-01-06 RX ORDER — LACOSAMIDE 150 MG/1
150 TABLET ORAL 2 TIMES DAILY
Qty: 60 TABLET | Refills: 5 | Status: SHIPPED | OUTPATIENT
Start: 2025-01-06 | End: 2025-07-05

## 2025-01-09 ENCOUNTER — TELEPHONE (OUTPATIENT)
Dept: BEHAVIORAL HEALTH | Facility: CLINIC | Age: 35
End: 2025-01-09
Payer: MEDICARE

## 2025-01-10 ENCOUNTER — TREATMENT (OUTPATIENT)
Dept: OCCUPATIONAL THERAPY | Facility: HOSPITAL | Age: 35
End: 2025-01-10
Payer: MEDICARE

## 2025-01-10 DIAGNOSIS — R41.3 MEMORY LOSS: ICD-10-CM

## 2025-01-10 DIAGNOSIS — R41.89 COGNITIVE IMPAIRMENT: Primary | ICD-10-CM

## 2025-01-10 PROCEDURE — 97535 SELF CARE MNGMENT TRAINING: CPT | Mod: GO | Performed by: OCCUPATIONAL THERAPIST

## 2025-01-10 ASSESSMENT — ACTIVITIES OF DAILY LIVING (ADL): HOME_MANAGEMENT_TIME_ENTRY: 64

## 2025-01-10 NOTE — PROGRESS NOTES
Occupational Therapy    Patient Name:Paige Ybarra  MRN:[id number]  Today's Date:1/10/2025  Referred by: Herminia Angulo  Time Calculation  Start Time: 1005  Stop Time: 1109  Time Calculation (min): 64 min    Therapy Diagnosis  Problem List Items Addressed This Visit             ICD-10-CM    Cognitive impairment - Primary R41.89    Memory loss R41.3       Insurance  Visit number: 14 (visit 1 of )  Approved number of visits: MN  Auth required: No  Authorization date range: N/A  Onset Date: 2024  Medicare Certification Period:  Beginnin/5/24         Endin25  Payor: MEDICARE / Plan: MEDICARE PART A AND B / Product Type: *No Product type* /     Precautions/Fall Risk:  Fall Risk: Low based on  STEADI  Pacemaker: no    Seizures: No      Subjective/Pain:  Patient reports that he was tracking vegetable/fruit intake until [employer].  He indicates that he did not do well after [employer].  He was not successful with cooking.       Subjective/Pain:  Current Pain Level (0-10): 0    HEP Compliance: Poor    Objective/Outcome Measures:     Treatment  ADL/IADL:    64 minutes  Recommended patient consider use of an accountability partner since other initiation strategies have been unsuccessful. Patient identified plan to shop on .  He required direct prompt to text mom and ask if she would be accountability partner on . Looked at motivation,  discussed motivation difficulty when cooking for only self, patient identified plan to cook for mom to help motivation.  Requested patient send OT picture of meal after he cooks to help increase accountability    Had patient complete goal management framework on his goal of cooking. He identified the following solutions:  Cook for mom  Go grocery shopping  Read cookbook  Cook for others  Patient identified pros/cons for each possible solution.   He identified plan to buy groceries and read cookbook(before cooking)  Steps to meet his selected solutions  devised by patient. He will take cookbook to mom's house, identify a recipe, and ask mom to take him to grocery store.      Patient  reports family is not happy because he is using highly processed foods like pizza, chicken nuggets.     [employer]- patient use reduced recently, decreased follow through with assignments, responsibilities as a result    Home program/[employer]:     Assessment:   Lack of follow through with cooking appears to be motivation related as well as self doubt of skills. He has had poor response to self motivation strategies and will require increased support from others. Will plan to cook/complete meal preparation retraining with patient in upcoming session.     Plan for next session:  complete meal preparation,  continue motivation strategy instruction

## 2025-01-14 ENCOUNTER — ANESTHESIA EVENT (OUTPATIENT)
Dept: OPERATING ROOM | Facility: HOSPITAL | Age: 35
End: 2025-01-14
Payer: MEDICARE

## 2025-01-14 ENCOUNTER — ANESTHESIA (OUTPATIENT)
Dept: OPERATING ROOM | Facility: HOSPITAL | Age: 35
End: 2025-01-14
Payer: MEDICARE

## 2025-01-14 ENCOUNTER — HOSPITAL ENCOUNTER (OUTPATIENT)
Facility: HOSPITAL | Age: 35
Setting detail: OUTPATIENT SURGERY
Discharge: HOME | End: 2025-01-14
Attending: SURGERY | Admitting: SURGERY
Payer: MEDICARE

## 2025-01-14 VITALS
WEIGHT: 185.63 LBS | RESPIRATION RATE: 14 BRPM | DIASTOLIC BLOOD PRESSURE: 81 MMHG | SYSTOLIC BLOOD PRESSURE: 118 MMHG | TEMPERATURE: 97.5 F | BODY MASS INDEX: 27.49 KG/M2 | HEIGHT: 69 IN | HEART RATE: 70 BPM | OXYGEN SATURATION: 98 %

## 2025-01-14 DIAGNOSIS — L05.91 PILONIDAL CYST: ICD-10-CM

## 2025-01-14 PROCEDURE — 0752T DGTZ GLS MCRSCP SLD LVL III: CPT | Mod: TC,AHULAB | Performed by: SURGERY

## 2025-01-14 PROCEDURE — 7100000001 HC RECOVERY ROOM TIME - INITIAL BASE CHARGE: Performed by: SURGERY

## 2025-01-14 PROCEDURE — 3600000003 HC OR TIME - INITIAL BASE CHARGE - PROCEDURE LEVEL THREE: Performed by: SURGERY

## 2025-01-14 PROCEDURE — 7100000002 HC RECOVERY ROOM TIME - EACH INCREMENTAL 1 MINUTE: Performed by: SURGERY

## 2025-01-14 PROCEDURE — 2500000005 HC RX 250 GENERAL PHARMACY W/O HCPCS: Performed by: SURGERY

## 2025-01-14 PROCEDURE — 2720000007 HC OR 272 NO HCPCS: Performed by: SURGERY

## 2025-01-14 PROCEDURE — 2500000004 HC RX 250 GENERAL PHARMACY W/ HCPCS (ALT 636 FOR OP/ED): Performed by: SURGERY

## 2025-01-14 PROCEDURE — 2500000004 HC RX 250 GENERAL PHARMACY W/ HCPCS (ALT 636 FOR OP/ED): Performed by: ANESTHESIOLOGIST ASSISTANT

## 2025-01-14 PROCEDURE — 2500000004 HC RX 250 GENERAL PHARMACY W/ HCPCS (ALT 636 FOR OP/ED): Performed by: ANESTHESIOLOGY

## 2025-01-14 PROCEDURE — 3600000008 HC OR TIME - EACH INCREMENTAL 1 MINUTE - PROCEDURE LEVEL THREE: Performed by: SURGERY

## 2025-01-14 PROCEDURE — 11772 EXC PILONIDAL CYST COMP: CPT | Performed by: SURGERY

## 2025-01-14 PROCEDURE — 7100000010 HC PHASE TWO TIME - EACH INCREMENTAL 1 MINUTE: Performed by: SURGERY

## 2025-01-14 PROCEDURE — 7100000009 HC PHASE TWO TIME - INITIAL BASE CHARGE: Performed by: SURGERY

## 2025-01-14 PROCEDURE — 3700000002 HC GENERAL ANESTHESIA TIME - EACH INCREMENTAL 1 MINUTE: Performed by: SURGERY

## 2025-01-14 PROCEDURE — 3700000001 HC GENERAL ANESTHESIA TIME - INITIAL BASE CHARGE: Performed by: SURGERY

## 2025-01-14 RX ORDER — CLINDAMYCIN PHOSPHATE 600 MG/50ML
INJECTION, SOLUTION INTRAVENOUS AS NEEDED
Status: DISCONTINUED | OUTPATIENT
Start: 2025-01-14 | End: 2025-01-14

## 2025-01-14 RX ORDER — ACETAMINOPHEN 325 MG/1
650 TABLET ORAL EVERY 4 HOURS PRN
Status: DISCONTINUED | OUTPATIENT
Start: 2025-01-14 | End: 2025-01-14 | Stop reason: HOSPADM

## 2025-01-14 RX ORDER — ROCURONIUM BROMIDE 10 MG/ML
INJECTION, SOLUTION INTRAVENOUS AS NEEDED
Status: DISCONTINUED | OUTPATIENT
Start: 2025-01-14 | End: 2025-01-14

## 2025-01-14 RX ORDER — LIDOCAINE HYDROCHLORIDE 20 MG/ML
INJECTION, SOLUTION INFILTRATION; PERINEURAL AS NEEDED
Status: DISCONTINUED | OUTPATIENT
Start: 2025-01-14 | End: 2025-01-14

## 2025-01-14 RX ORDER — PROPOFOL 10 MG/ML
INJECTION, EMULSION INTRAVENOUS AS NEEDED
Status: DISCONTINUED | OUTPATIENT
Start: 2025-01-14 | End: 2025-01-14

## 2025-01-14 RX ORDER — SODIUM CHLORIDE 0.9 G/100ML
INJECTION, SOLUTION IRRIGATION AS NEEDED
Status: DISCONTINUED | OUTPATIENT
Start: 2025-01-14 | End: 2025-01-14 | Stop reason: HOSPADM

## 2025-01-14 RX ORDER — BUSPIRONE HYDROCHLORIDE 5 MG/1
5 TABLET ORAL DAILY
COMMUNITY

## 2025-01-14 RX ORDER — BUSPIRONE HYDROCHLORIDE 10 MG/1
10 TABLET ORAL NIGHTLY
COMMUNITY

## 2025-01-14 RX ORDER — LIDOCAINE HYDROCHLORIDE 10 MG/ML
INJECTION, SOLUTION INFILTRATION; PERINEURAL AS NEEDED
Status: DISCONTINUED | OUTPATIENT
Start: 2025-01-14 | End: 2025-01-14 | Stop reason: HOSPADM

## 2025-01-14 RX ORDER — SODIUM CHLORIDE, SODIUM LACTATE, POTASSIUM CHLORIDE, CALCIUM CHLORIDE 600; 310; 30; 20 MG/100ML; MG/100ML; MG/100ML; MG/100ML
100 INJECTION, SOLUTION INTRAVENOUS CONTINUOUS
Status: DISCONTINUED | OUTPATIENT
Start: 2025-01-14 | End: 2025-01-14 | Stop reason: HOSPADM

## 2025-01-14 RX ORDER — FENTANYL CITRATE 50 UG/ML
INJECTION, SOLUTION INTRAMUSCULAR; INTRAVENOUS AS NEEDED
Status: DISCONTINUED | OUTPATIENT
Start: 2025-01-14 | End: 2025-01-14

## 2025-01-14 RX ORDER — METOCLOPRAMIDE HYDROCHLORIDE 5 MG/ML
10 INJECTION INTRAMUSCULAR; INTRAVENOUS ONCE AS NEEDED
Status: DISCONTINUED | OUTPATIENT
Start: 2025-01-14 | End: 2025-01-14 | Stop reason: HOSPADM

## 2025-01-14 RX ORDER — ONDANSETRON HYDROCHLORIDE 2 MG/ML
INJECTION, SOLUTION INTRAVENOUS AS NEEDED
Status: DISCONTINUED | OUTPATIENT
Start: 2025-01-14 | End: 2025-01-14

## 2025-01-14 RX ORDER — BUPIVACAINE HCL/EPINEPHRINE 0.5-1:200K
VIAL (ML) INJECTION AS NEEDED
Status: DISCONTINUED | OUTPATIENT
Start: 2025-01-14 | End: 2025-01-14 | Stop reason: HOSPADM

## 2025-01-14 RX ORDER — LIDOCAINE HYDROCHLORIDE 10 MG/ML
0.1 INJECTION, SOLUTION EPIDURAL; INFILTRATION; INTRACAUDAL; PERINEURAL ONCE
Status: DISCONTINUED | OUTPATIENT
Start: 2025-01-14 | End: 2025-01-14 | Stop reason: HOSPADM

## 2025-01-14 RX ORDER — OXYCODONE HYDROCHLORIDE 5 MG/1
5 TABLET ORAL EVERY 6 HOURS PRN
Qty: 12 TABLET | Refills: 0 | Status: SHIPPED | OUTPATIENT
Start: 2025-01-14

## 2025-01-14 RX ORDER — OXYCODONE HYDROCHLORIDE 5 MG/1
5 TABLET ORAL EVERY 4 HOURS PRN
Status: DISCONTINUED | OUTPATIENT
Start: 2025-01-14 | End: 2025-01-14 | Stop reason: HOSPADM

## 2025-01-14 RX ORDER — BUSPIRONE HYDROCHLORIDE 30 MG/1
TABLET ORAL 2 TIMES DAILY
Status: ON HOLD | COMMUNITY
End: 2025-01-14 | Stop reason: ALTCHOICE

## 2025-01-14 RX ORDER — KETOROLAC TROMETHAMINE 30 MG/ML
INJECTION, SOLUTION INTRAMUSCULAR; INTRAVENOUS AS NEEDED
Status: DISCONTINUED | OUTPATIENT
Start: 2025-01-14 | End: 2025-01-14

## 2025-01-14 RX ORDER — MIDAZOLAM HYDROCHLORIDE 1 MG/ML
INJECTION INTRAMUSCULAR; INTRAVENOUS AS NEEDED
Status: DISCONTINUED | OUTPATIENT
Start: 2025-01-14 | End: 2025-01-14

## 2025-01-14 RX ORDER — DIPHENHYDRAMINE HYDROCHLORIDE 50 MG/ML
25 INJECTION INTRAMUSCULAR; INTRAVENOUS ONCE
Status: COMPLETED | OUTPATIENT
Start: 2025-01-14 | End: 2025-01-14

## 2025-01-14 RX ADMIN — SODIUM CHLORIDE, POTASSIUM CHLORIDE, SODIUM LACTATE AND CALCIUM CHLORIDE: 600; 310; 30; 20 INJECTION, SOLUTION INTRAVENOUS at 10:52

## 2025-01-14 RX ADMIN — FENTANYL CITRATE 100 MCG: 50 INJECTION, SOLUTION INTRAMUSCULAR; INTRAVENOUS at 11:01

## 2025-01-14 RX ADMIN — LIDOCAINE HYDROCHLORIDE 100 MG: 20 INJECTION, SOLUTION INFILTRATION; PERINEURAL at 11:01

## 2025-01-14 RX ADMIN — CLINDAMYCIN IN 5 PERCENT DEXTROSE 600 MG: 12 INJECTION, SOLUTION INTRAVENOUS at 11:10

## 2025-01-14 RX ADMIN — SUGAMMADEX 200 MG: 100 INJECTION, SOLUTION INTRAVENOUS at 12:11

## 2025-01-14 RX ADMIN — ROCURONIUM BROMIDE 70 MG: 10 INJECTION, SOLUTION INTRAVENOUS at 11:03

## 2025-01-14 RX ADMIN — ONDANSETRON 4 MG: 2 INJECTION, SOLUTION INTRAMUSCULAR; INTRAVENOUS at 11:39

## 2025-01-14 RX ADMIN — KETOROLAC TROMETHAMINE 30 MG: 30 INJECTION, SOLUTION INTRAMUSCULAR; INTRAVENOUS at 11:39

## 2025-01-14 RX ADMIN — PROPOFOL 200 MG: 10 INJECTION, EMULSION INTRAVENOUS at 11:01

## 2025-01-14 RX ADMIN — DEXAMETHASONE SODIUM PHOSPHATE 8 MG: 4 INJECTION, SOLUTION INTRAMUSCULAR; INTRAVENOUS at 11:15

## 2025-01-14 RX ADMIN — DIPHENHYDRAMINE HYDROCHLORIDE 25 MG: 50 INJECTION, SOLUTION INTRAMUSCULAR; INTRAVENOUS at 13:41

## 2025-01-14 RX ADMIN — MIDAZOLAM HYDROCHLORIDE 2 MG: 1 INJECTION, SOLUTION INTRAMUSCULAR; INTRAVENOUS at 10:54

## 2025-01-14 SDOH — HEALTH STABILITY: MENTAL HEALTH: CURRENT SMOKER: 0

## 2025-01-14 ASSESSMENT — PAIN SCALES - GENERAL
PAINLEVEL_OUTOF10: 0 - NO PAIN

## 2025-01-14 ASSESSMENT — PAIN - FUNCTIONAL ASSESSMENT
PAIN_FUNCTIONAL_ASSESSMENT: UNABLE TO SELF-REPORT
PAIN_FUNCTIONAL_ASSESSMENT: 0-10
PAIN_FUNCTIONAL_ASSESSMENT: UNABLE TO SELF-REPORT
PAIN_FUNCTIONAL_ASSESSMENT: 0-10
PAIN_FUNCTIONAL_ASSESSMENT: UNABLE TO SELF-REPORT
PAIN_FUNCTIONAL_ASSESSMENT: 0-10

## 2025-01-14 ASSESSMENT — COLUMBIA-SUICIDE SEVERITY RATING SCALE - C-SSRS
1. IN THE PAST MONTH, HAVE YOU WISHED YOU WERE DEAD OR WISHED YOU COULD GO TO SLEEP AND NOT WAKE UP?: NO
6. HAVE YOU EVER DONE ANYTHING, STARTED TO DO ANYTHING, OR PREPARED TO DO ANYTHING TO END YOUR LIFE?: NO
2. HAVE YOU ACTUALLY HAD ANY THOUGHTS OF KILLING YOURSELF?: NO

## 2025-01-14 NOTE — POST-PROCEDURE NOTE
1416: pt placed into phase II care  1426: family bedside  1434: pt tolerating PO intake at this time  1440: pt dressed with assistance  1445: IV removed, pt tolerated well. Catheter intact  1450: Discharge instructions discussed with patient and family at this time verbalized understanding   1452: Family sent for vehicle, pt put in for transportation  1504: Transportation bedside  1505: Patient Discharged in stable condition via wheelchair to [facility]

## 2025-01-14 NOTE — ANESTHESIA POSTPROCEDURE EVALUATION
Patient: Paige Ybarra    Procedure Summary       Date: 01/14/25 Room / Location: [facility] OR 04 / Virtual [facility] OR    Anesthesia Start: 1052 Anesthesia Stop:     Procedure: Pilonidal Cyst Excision (Scrotum) Diagnosis: (excision of pilonidal cyst, patient needs PAT)    Surgeons: Fred Miranda MD Responsible Provider: Francisco Javier Clifotn MD    Anesthesia Type: general ASA Status: 2            Anesthesia Type: general    Vitals Value Taken Time   /74 01/14/25 1212   Temp 36.3 01/14/25 1212   Pulse 80 01/14/25 1212   Resp 18 01/14/25 1212   SpO2 100 01/14/25 1212       Anesthesia Post Evaluation    Patient location during evaluation: PACU  Patient participation: complete - patient participated  Level of consciousness: awake  Pain management: adequate  Airway patency: patent  Cardiovascular status: acceptable  Respiratory status: acceptable  Hydration status: acceptable  Postoperative Nausea and Vomiting: none      No notable events documented.

## 2025-01-14 NOTE — OP NOTE
Pilonidal Cyst Excision Operative Note     Date: 2025  OR Location: [facility] OR    Name: Paige Ybarra, : 1990, Age: 34 y.o., MRN: [id number], Sex: male    Diagnosis  Pilonidal Cyst  ** Pilonidal Cyst      Procedures  Wide excision of Pilonidal Cyst       Surgeons      * Fred Miranda - Primary    Resident/Fellow/Other Assistant:  Surgeons and Role:  * No surgeons found with a matching role *    Staff:   Surgical Assistant:   Circulator: Alondra Majorub Person: Madeline Majorub Person: Ruthie Suh Circulator: Alessandro Suh Scrub: Roxi    Anesthesia Staff: Anesthesiologist: Francisco Javier Clifton MD  CRNA: MAX Briones-CRNA  C-AA: KATIANA Quan    Procedure Summary  Anesthesia: Anesthesia type not filed in the log.  ASA: II  Estimated Blood Loss: 4mL  Intra-op Medications:   Administrations occurring from 1045 to 1200 on 25:   Medication Name Total Dose   sodium chloride 0.9 % irrigation solution 1,000 mL   clindamycin (Cleocin)  mg in 50 mL D5W - premix 600 mg   dexAMETHasone (Decadron) injection 4 mg/mL 8 mg   fentaNYL (Sublimaze) injection 50 mcg/mL 100 mcg   ketorolac (Toradol) 30 mg 30 mg   LR bolus Cannot be calculated   lidocaine (Xylocaine) injection 2 % 100 mg   midazolam PF (Versed) injection 1 mg/mL 2 mg   ondansetron (Zofran) 2 mg/mL injection 4 mg   propofol (Diprivan) injection 10 mg/mL 200 mg   rocuronium (ZeMuron) 50 mg/5 mL injection 70 mg              Anesthesia Record               Intraprocedure I/O Totals       None           Specimen:   ID Type Source Tests Collected by Time   1 : Pilonidal Cyst Tissue PILONIDAL CYST SURGICAL PATHOLOGY EXAM Fred Miranda MD 2025 1045             Indications: Paige Ybarra is an 34 y.o. male who is having surgery for excision of pilonidal cyst, patient needs PAT.     The patient was seen in the preoperative area. The risks, benefits, complications, treatment options, non-operative alternatives, expected recovery and outcomes were  discussed with the patient. The possibilities of reaction to medication, pulmonary aspiration, injury to surrounding structures, bleeding, recurrent infection, the need for additional procedures, failure to diagnose a condition, and creating a complication requiring transfusion or operation were discussed with the patient. The patient concurred with the proposed plan, giving informed consent.  The site of surgery was properly noted/marked if necessary per policy. The patient has been actively warmed in preoperative area. Preoperative antibiotics have been ordered and given within 1 hours of incision. Venous thrombosis prophylaxis have been ordered including bilateral sequential compression devices     Procedure Details:    CLINICAL NOTE:    Patient is a very pleasant 34-year-old gentleman is referred for evaluation and treatment of a pilonidal cyst.  He states that 4 months ago he developed painful swelling in the intergluteal crease and to the left of midline.    He has recurrent     disease in the same area involving the upper margin into gluteal crease.  Inflammation is minimal at this time and he comes in for pilonidal cystectomy.  Risks, benefits, and alternatives were discussed preoperatively, and she agrees to the operation.         DESCRIPTION OF PROCEDURE:   The patient was taken to the operating room.  Time-out was established.  General anesthesia was induced.  he was given antibiotics.  he was then placed in the jackknife prone position on the operating table.  The buttocks were taped apart.  We prepped and draped the midline in a sterile fashion.  We srini out an elliptical incision to include multiple pits, pores, and indurated tissues in the upper margin of the intergluteal crease.  We carried our dissection down to the fascia of the sacrum.  Pilonidal cystic tissues were handed off the field as a specimen.  We then raised skin flaps, irrigated the wound, and then after ensuring hemostasis, we closed  the wound using 0 Vicryl stitches placed in interrupted figure-of-eight stitch pattern.  This reapproximated the wound nicely.    the skin was reapproximated using 3-0 nylon sutures placed in interrupted vertical mattress fashion.  Antibiotic ointment, Xeroform, and a dry sterile dressing were applied.  The patient tolerated the procedure without difficulty and was returned to the recovery room in stable condition.      Complications:  None; patient tolerated the procedure well.    Disposition: PACU - hemodynamically stable.  Condition: stable         Attending Attestation: I was present and scrubbed for the entire procedure.    Fred Miranda  Phone Number: [phone]
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
## 2025-01-14 NOTE — NURSING NOTE
1217- patient to [facility] following procedure patient sedated oral airway in place. Handoff received  1230- patient still sedated  1240- oral airway removed after patient started to move and oxygen turned off   1250- patient still very out of it following surgery, occasionally responds to voice

## 2025-01-14 NOTE — ANESTHESIA PROCEDURE NOTES
Airway  Date/Time: 1/14/2025 11:05 AM  Urgency: elective    Airway not difficult    Staffing  Performed: KATIANA   Authorized by: Francisco Javier Clifton MD    Performed by: KATIANA Quan  Patient location during procedure: OR    Indications and Patient Condition  Indications for airway management: anesthesia  Spontaneous ventilation: present  Sedation level: deep  Preoxygenated: yes  Patient position: sniffing  Mask difficulty assessment: 1 - vent by mask    Final Airway Details  Final airway type: endotracheal airway      Successful airway: ETT  Cuffed: yes   Successful intubation technique: direct laryngoscopy  Facilitating devices/methods: intubating stylet  Endotracheal tube insertion site: oral  Blade: Elaine  Blade size: #4  ETT size (mm): 7.5  Cormack-Lehane Classification: grade I - full view of glottis  Placement verified by: chest auscultation and capnometry   Measured from: teeth  ETT to teeth (cm): 22  Number of attempts at approach: 1

## 2025-01-16 ENCOUNTER — APPOINTMENT (OUTPATIENT)
Dept: BEHAVIORAL HEALTH | Facility: CLINIC | Age: 35
End: 2025-01-16
Payer: MEDICARE

## 2025-01-16 DIAGNOSIS — F09 COGNITIVE DYSFUNCTION: ICD-10-CM

## 2025-01-16 DIAGNOSIS — F90.0 ATTENTION DEFICIT HYPERACTIVITY DISORDER (ADHD), PREDOMINANTLY INATTENTIVE TYPE: ICD-10-CM

## 2025-01-16 DIAGNOSIS — F31.9 BIPOLAR 1 DISORDER (MULTI): ICD-10-CM

## 2025-01-16 PROCEDURE — 90834 PSYTX W PT 45 MINUTES: CPT | Performed by: PSYCHOLOGIST

## 2025-01-16 ASSESSMENT — PAIN SCALES - GENERAL: PAINLEVEL_OUTOF10: 0 - NO PAIN

## 2025-01-16 NOTE — PROGRESS NOTES
9AM 27882 Indiv Psych for ADHD, bipolar I, executive/cognitive dysfunction (45 MIN, 221-667). Virtual. Supportive Therapy. Mood stable.  patient had a cyst removed from his buttocks on Tuesday.  Was under general anesthesia.  Currently recovering.  Has a follow-up scheduled later in the month to get his stitches removed and be reevaluated.  Given some pain medications but he is been hesitant to take this.  As a result of the surgery has been off work.  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  Still preoccupied with dating and continues to talk to a few women online on [email or web address] website.  Encouraged him to think more about goals for this year.  He wants to continue with his reading and would like to get back to tennis other than that no specific objective mention.  We discussed the importance of shifting the focus away from dating to building a support network. Next: 2 weeks.

## 2025-01-21 LAB
LABORATORY COMMENT REPORT: NORMAL
PATH REPORT.FINAL DX SPEC: NORMAL
PATH REPORT.GROSS SPEC: NORMAL
PATH REPORT.RELEVANT HX SPEC: NORMAL
PATH REPORT.TOTAL CANCER: NORMAL

## 2025-01-23 ENCOUNTER — LAB (OUTPATIENT)
Dept: LAB | Facility: LAB | Age: 35
End: 2025-01-23
Payer: MEDICARE

## 2025-01-23 DIAGNOSIS — F31.9 BIPOLAR 1 DISORDER (MULTI): ICD-10-CM

## 2025-01-23 LAB
BASOPHILS # BLD AUTO: 0.05 X10*3/UL (ref 0–0.1)
BASOPHILS NFR BLD AUTO: 0.5 %
EOSINOPHIL # BLD AUTO: 0.14 X10*3/UL (ref 0–0.7)
EOSINOPHIL NFR BLD AUTO: 1.3 %
ERYTHROCYTE [DISTWIDTH] IN BLOOD BY AUTOMATED COUNT: 13 % (ref 11.5–14.5)
HCT VFR BLD AUTO: 38.4 % (ref 41–52)
HGB BLD-MCNC: 11.7 G/DL (ref 13.5–17.5)
IMM GRANULOCYTES # BLD AUTO: 0.04 X10*3/UL (ref 0–0.7)
IMM GRANULOCYTES NFR BLD AUTO: 0.4 % (ref 0–0.9)
LYMPHOCYTES # BLD AUTO: 2.47 X10*3/UL (ref 1.2–4.8)
LYMPHOCYTES NFR BLD AUTO: 22.5 %
MCH RBC QN AUTO: 28.3 PG (ref 26–34)
MCHC RBC AUTO-ENTMCNC: 30.5 G/DL (ref 32–36)
MCV RBC AUTO: 93 FL (ref 80–100)
MONOCYTES # BLD AUTO: 0.55 X10*3/UL (ref 0.1–1)
MONOCYTES NFR BLD AUTO: 5 %
NEUTROPHILS # BLD AUTO: 7.71 X10*3/UL (ref 1.2–7.7)
NEUTROPHILS NFR BLD AUTO: 70.3 %
NRBC BLD-RTO: 0 /100 WBCS (ref 0–0)
PLATELET # BLD AUTO: 264 X10*3/UL (ref 150–450)
RBC # BLD AUTO: 4.14 X10*6/UL (ref 4.5–5.9)
WBC # BLD AUTO: 11 X10*3/UL (ref 4.4–11.3)

## 2025-01-23 PROCEDURE — 85025 COMPLETE CBC W/AUTO DIFF WBC: CPT

## 2025-01-29 ENCOUNTER — OFFICE VISIT (OUTPATIENT)
Dept: SURGERY | Facility: CLINIC | Age: 35
End: 2025-01-29
Payer: MEDICARE

## 2025-01-29 VITALS
BODY MASS INDEX: 27.32 KG/M2 | HEART RATE: 87 BPM | WEIGHT: 185 LBS | SYSTOLIC BLOOD PRESSURE: 135 MMHG | DIASTOLIC BLOOD PRESSURE: 94 MMHG

## 2025-01-29 DIAGNOSIS — L05.91 PILONIDAL CYST: Primary | ICD-10-CM

## 2025-01-29 PROCEDURE — 1036F TOBACCO NON-USER: CPT | Performed by: SURGERY

## 2025-01-29 PROCEDURE — 99211 OFF/OP EST MAY X REQ PHY/QHP: CPT | Performed by: SURGERY

## 2025-01-29 PROCEDURE — 3075F SYST BP GE 130 - 139MM HG: CPT | Performed by: SURGERY

## 2025-01-29 PROCEDURE — 3080F DIAST BP >= 90 MM HG: CPT | Performed by: SURGERY

## 2025-01-29 RX ORDER — DOXYCYCLINE 100 MG/1
100 CAPSULE ORAL 2 TIMES DAILY
Qty: 20 CAPSULE | Refills: 0 | Status: SHIPPED | OUTPATIENT
Start: 2025-01-29 | End: 2025-02-08

## 2025-01-29 ASSESSMENT — PAIN SCALES - GENERAL: PAINLEVEL_OUTOF10: 0-NO PAIN

## 2025-01-29 ASSESSMENT — ENCOUNTER SYMPTOMS
LOSS OF SENSATION IN FEET: 0
DEPRESSION: 0
OCCASIONAL FEELINGS OF UNSTEADINESS: 0

## 2025-01-29 NOTE — PROGRESS NOTES
"   Mr. Paige Ybarra is a 34 y.o. year old male who comes in today for follow-up after undergoing  Wide excision of Pilonidal Cyst .  This procedure was performed on 1/14/25.     Patient is doing very well and is pleased with the outcome of the surgery. He has seepage from the wound that requires a pad     Tolerating a regular diet, bowel movements are normal    On exam patient looks well.    Incisions are healing very nicely.  I removed every other stitch   Has dressing that is saturated with serous fluid.     Surgical Pathology Exam: [id number]  Order: [id number]   Collected 1/14/2025 10:45       Status: Final result       Visible to patient: Yes (seen)       Dx: Pilonidal cyst    0 Result Notes       Component  Resulting Agency   FINAL DIAGNOSIS   A. SKIN, \"PILONIDAL CYST\", EXCISION:  --DILATED FOLLICLE WITH PERIFOLLICULAR FIBROSIS, VASCULAR ECTASIA AND LYMPHOPLASMACYTIC INFLAMMATION     COMMENT: Although not specific, the findings are compatible with the clinical history of a pilonidal cyst.               Impression: Doing well following  pilonidal cystectomy.      Some seepage but wound looks good.  Removed a few sutures    Will Rx abx    Discussed increasing activity level    Follow-up with me  in one week   "

## 2025-01-29 NOTE — LETTER
"January 29, 2025     Syed Hong MD  [address] [facility]  [address] [address]    Patient: Paige Ybarra   YOB: 1990   Date of Visit: 1/29/2025       Dear Dr. Syed Hong MD:    Thank you for referring Paige Ybarra to me for evaluation. Below are my notes for this consultation.  If you have questions, please do not hesitate to call me. I look forward to following your patient along with you.       Sincerely,     Fred Miranda MD      CC: No Recipients  ______________________________________________________________________________________       Mr. Paige Ybarra is a 34 y.o. year old male who comes in today for follow-up after undergoing  Wide excision of Pilonidal Cyst .  This procedure was performed on 1/14/25.     Patient is doing very well and is pleased with the outcome of the surgery. He has seepage from the wound that requires a pad     Tolerating a regular diet, bowel movements are normal    On exam patient looks well.    Incisions are healing very nicely.  I removed every other stitch   Has dressing that is saturated with serous fluid.     Surgical Pathology Exam: [id number]  Order: [id number]   Collected 1/14/2025 10:45       Status: Final result       Visible to patient: Yes (seen)       Dx: Pilonidal cyst    0 Result Notes       Component  Resulting Agency   FINAL DIAGNOSIS   A. SKIN, \"PILONIDAL CYST\", EXCISION:  --DILATED FOLLICLE WITH PERIFOLLICULAR FIBROSIS, VASCULAR ECTASIA AND LYMPHOPLASMACYTIC INFLAMMATION     COMMENT: Although not specific, the findings are compatible with the clinical history of a pilonidal cyst.               Impression: Doing well following  pilonidal cystectomy.      Some seepage but wound looks good.  Removed a few sutures    Will Rx abx    Discussed increasing activity level    Follow-up with me  in one week   "

## 2025-01-30 ENCOUNTER — APPOINTMENT (OUTPATIENT)
Dept: BEHAVIORAL HEALTH | Facility: CLINIC | Age: 35
End: 2025-01-30
Payer: MEDICARE

## 2025-01-30 ENCOUNTER — OFFICE VISIT (OUTPATIENT)
Facility: CLINIC | Age: 35
End: 2025-01-30
Payer: MEDICARE

## 2025-01-30 DIAGNOSIS — R41.844 FRONTAL LOBE AND EXECUTIVE FUNCTION DEFICIT: ICD-10-CM

## 2025-01-30 DIAGNOSIS — Z87.820 HISTORY OF TRAUMATIC BRAIN INJURY: ICD-10-CM

## 2025-01-30 DIAGNOSIS — R41.3 MEMORY CHANGES: ICD-10-CM

## 2025-01-30 DIAGNOSIS — R41.89 COGNITIVE IMPAIRMENT: Primary | ICD-10-CM

## 2025-01-30 DIAGNOSIS — R41.89 COGNITIVE IMPAIRMENT: ICD-10-CM

## 2025-01-30 DIAGNOSIS — F90.0 ATTENTION DEFICIT HYPERACTIVITY DISORDER (ADHD), PREDOMINANTLY INATTENTIVE TYPE: ICD-10-CM

## 2025-01-30 DIAGNOSIS — F31.9 BIPOLAR 1 DISORDER (MULTI): ICD-10-CM

## 2025-01-30 PROCEDURE — 96159 HLTH BHV IVNTJ INDIV EA ADDL: CPT | Performed by: PSYCHOLOGIST

## 2025-01-30 PROCEDURE — 90834 PSYTX W PT 45 MINUTES: CPT | Performed by: PSYCHOLOGIST

## 2025-01-30 PROCEDURE — 96158 HLTH BHV IVNTJ INDIV 1ST 30: CPT | Performed by: PSYCHOLOGIST

## 2025-01-30 NOTE — PROGRESS NOTES
8AM 82834 Indiv Psych for ADHD, bipolar I, executive/cognitive dysfunction (45 MIN, 819-920). Virtual. Supportive Therapy. Mood stable.  Patient is now back to [employer] job.  Spent time discussing his recent day with Michael,  a woman he met online who lives in [place].  Last weekend went to the [employer], [place] as well as [employer].  Had a good time.  Patient is evaluated in the relationship.  We discussed the importance of his making some efforts of pursuing other relationships outside of romantic relationships.  Getting to [employer] and the [employer]twick. Next: 2 weeks.

## 2025-01-30 NOTE — PROGRESS NOTES
"Neuropsychology Note    Name: Paige Ybarra    Date of Service: 01/30/2025     Reason For Visit: Neuropsychological Rehabilitation    Summary of Visit:  Paige Ybarra is being seen for neuropsychological rehabilitation of cognitive and emotional changes resulting from traumatic brain injury. We reviewed his schedule of activities:  Reading: doing well with his ; meeting goal of reading at least 10 minutes in addition to reading assignments  Exercise: has been on hold due to surgery; can now resume; commits to starting this weekend  Cleaning: doing \"okay\"  Plants: doing very well, has photos of very healthy looking plants  Cooking: working with OT; cooking healthy meal at least once a week; aims to reduce eating ultraprocessed food  Neumind brain injury savage: using regularly; has had practice with OT, which has helped  Jobs: attending all jobs regularly and enjoys them  4 pm rule (no social media before 4 pm): adheres to the rule  Dating: has met and had a couple of dates with a woman he likes    We also discussed the plan to hire a  to help him with organizing and maintaining accountability. His mother has suggested someone who Paige has not met. We discussed the need to have interview questions and wrote a few together. He will type them up and send to me and to his mother. Overall, Paige now understands the reason for a PA and is fine with it. He said that he feels his schedule is neither too busy or too empty, but feels about right. Although he has felt lonely, right now he is not experiencing much loneliness.    Time: 4:01 pm - 4:58 pm    Next Session:  02/27/2025    "

## 2025-01-31 ENCOUNTER — APPOINTMENT (OUTPATIENT)
Dept: OCCUPATIONAL THERAPY | Facility: HOSPITAL | Age: 35
End: 2025-01-31
Payer: MEDICARE

## 2025-02-04 ENCOUNTER — TREATMENT (OUTPATIENT)
Dept: OCCUPATIONAL THERAPY | Facility: HOSPITAL | Age: 35
End: 2025-02-04
Payer: MEDICARE

## 2025-02-04 DIAGNOSIS — R41.89 COGNITIVE IMPAIRMENT: ICD-10-CM

## 2025-02-04 DIAGNOSIS — R41.3 MEMORY LOSS: ICD-10-CM

## 2025-02-04 PROCEDURE — 97535 SELF CARE MNGMENT TRAINING: CPT | Mod: GO | Performed by: OCCUPATIONAL THERAPIST

## 2025-02-04 ASSESSMENT — ACTIVITIES OF DAILY LIVING (ADL): HOME_MANAGEMENT_TIME_ENTRY: 63

## 2025-02-04 NOTE — PROGRESS NOTES
Occupational Therapy    Patient Name:Paige Ybarra  MRN:[id number]  Today's Date:2025  Referred by: Herminia Angulo  Time Calculation  Start Time: 1006  Stop Time: 1109  Time Calculation (min): 63 min    Therapy Diagnosis  Problem List Items Addressed This Visit             ICD-10-CM    Cognitive impairment R41.89    Memory loss R41.3       Insurance  Visit number: 15 (visit 2 of this year)  Approved number of visits: [place]  Auth required: No  Authorization date range: N/A  Onset Date: 2024  Medicare Certification Period:  Beginnin/5/2025            Endin25  Payor: MEDICARE / Plan: MEDICARE PART A AND B / Product Type: *No Product type* /     Precautions/Fall Risk:  Fall Risk: Low based on  STEADI  Pacemaker: no    Seizures: No        Subjective/Pain:  Patient reports that things are going well, he found recipes that he likes.  He has an interview for potential .  Indicates that he has slacked off on Neumind check offs, he however indicates that the pop up reminder is very helpful.  He feels that he does not need to enter new reminders and is having success with nudges and reminders.  He also uses the tasks on a daily basis.    Patient reports that he had surgery to remove a cyst and that it went well.     Current Pain Level (0-10): 0    HEP Compliance: Good    Objective/Outcome Measures:      25 1015   OT Adult Other Outcome Measures   Other Outcome Measures Everyday Memory Questionnare: 38     Treatment  ADL/IADL: 63 minutes  Eating fruits/vegetables-  is eating bananas each morning,  carrots as a snack, also bought salad    Cooking- now making dinner 2x a week while at mom's. He has purchased ingredients to cook alone. When asked to identify a plan to cook at home.     Patient plans to rotate spinach ravioli bake and mozzarella tortellini bake.      Biggest challenge- deciding what to buy when shopping  Was able to create recipe list from 2 different recipes.      Patient asked to identify plan for self motivation since he will no longer be staying with his mom. He identified the following  -sending OT and Dr. MALONE pictures, hiring  for 2 hours a week  Avoid buying off limits foods-aka easy things like frozen/processed meals  -use grocery list  -take picture of goal meal/recipe and take it with him to grocery store    Homemaking- is completing homemaking tasks independently      Home program/[facility]:     Assessment:     Patient expressed the following goals:   Goals: continue to work on cooking  Learning Meal planning as legos  Would like to work on cooking chicken- worried about making self sick  Work on ability to substitute food     Patient has made improvement in ability to manage his daily tasks with use of memory strategies. He demonstrates increased insight into his follow through, need for strategy use, and effects of not utilizing his tools. He has been completing prospective tasks, daily responsibilities and appointments independently.  Memory goals met overall.  Continued use of memory strategies is necessary in order for patient to be successful.  Continue OT tx to further address meal planning, meal preparation, shopping. Goal added for ability to substitute foods and for cooking safety.    Goals:   Patient will independently use compensatory tool for memory to allow recall of prospective tasks, appointments, tasks he needs to complete. Met    Patient will remember and initiate new/unfamiliar tasks and tasks that are not part of his daily routine independently using compensatory tools. Met    Patient will demonstrate independent meal planning, shopping, and meal preparation using compensatory tools/techniques. progressing    Patient will perform grocery shopping activities independently and consistently using cognitive compensatory techniques. progressing    Patient will demonstrate ability to plan meals using My plate or [employer] the [employer]  tools 75% of the time. progressing    Patient will complete basic home making tasks independently using compensatory cognitive tools/techniques to allow living independently. met    Patient will independently demonstrate safety awareness while preparing a stove top meal including management of chicken/meat. added    Patient will independently problem solve appropriate food substitutes for recipes through use of compensatory techniques. added          Plan for next session:  address meal planning/preparation/safety

## 2025-02-05 ENCOUNTER — OFFICE VISIT (OUTPATIENT)
Dept: SURGERY | Facility: CLINIC | Age: 35
End: 2025-02-05
Payer: MEDICARE

## 2025-02-05 VITALS
BODY MASS INDEX: 27.32 KG/M2 | WEIGHT: 185 LBS | HEART RATE: 84 BPM | DIASTOLIC BLOOD PRESSURE: 92 MMHG | SYSTOLIC BLOOD PRESSURE: 142 MMHG

## 2025-02-05 DIAGNOSIS — Z09 FOLLOW-UP EXAM: Primary | ICD-10-CM

## 2025-02-05 PROCEDURE — 99211 OFF/OP EST MAY X REQ PHY/QHP: CPT | Performed by: SURGERY

## 2025-02-05 PROCEDURE — 3077F SYST BP >= 140 MM HG: CPT | Performed by: SURGERY

## 2025-02-05 PROCEDURE — 1036F TOBACCO NON-USER: CPT | Performed by: SURGERY

## 2025-02-05 PROCEDURE — 3080F DIAST BP >= 90 MM HG: CPT | Performed by: SURGERY

## 2025-02-05 ASSESSMENT — ENCOUNTER SYMPTOMS
OCCASIONAL FEELINGS OF UNSTEADINESS: 0
DEPRESSION: 0
LOSS OF SENSATION IN FEET: 0

## 2025-02-05 ASSESSMENT — PAIN SCALES - GENERAL: PAINLEVEL_OUTOF10: 0-NO PAIN

## 2025-02-05 NOTE — LETTER
"February 5, 2025     Syed Hong MD  [address] [facility]  [address] [address]    Patient: Paige Ybarra   YOB: 1990   Date of Visit: 2/5/2025       Dear Dr. Syed Hong MD:    Thank you for referring Paige Ybarra to me for evaluation. Below are my notes for this consultation.  If you have questions, please do not hesitate to call me. I look forward to following your patient along with you.       Sincerely,     Fred Miranda MD      CC: No Recipients  ______________________________________________________________________________________       MrFeroz Ybarra is a 34 y.o. year old male who comes in today for follow-up after undergoing  Wide excision of Pilonidal Cyst .  This procedure was performed on 1/14/25.      Comes for wound check.  Continues to do well    No more seepage.        On exam patient looks well.    Incisions are healing very nicely.  I removed the remainder of his stitches.        Surgical Pathology Exam: [id number]  Order: [id number]   Collected 1/14/2025 10:45       Status: Final result       Visible to patient: Yes (seen)       Dx: Pilonidal cyst    0 Result Notes          Component   Resulting Agency   FINAL DIAGNOSIS   A. SKIN, \"PILONIDAL CYST\", EXCISION:  --DILATED FOLLICLE WITH PERIFOLLICULAR FIBROSIS, VASCULAR ECTASIA AND LYMPHOPLASMACYTIC INFLAMMATION     COMMENT: Although not specific, the findings are compatible with the clinical history of a pilonidal cyst.                  Impression: Doing well following  pilonidal cystectomy.       Had some  seepage but has resolved.  Removed   sutures        Discussed increasing activity level     Follow-up with me  as needed   "

## 2025-02-05 NOTE — PROGRESS NOTES
"   Mr. Paige Ybarra is a 34 y.o. year old male who comes in today for follow-up after undergoing  Wide excision of Pilonidal Cyst .  This procedure was performed on 1/14/25.      Comes for wound check.  Continues to do well    No more seepage.        On exam patient looks well.    Incisions are healing very nicely.  I removed the remainder of his stitches.        Surgical Pathology Exam: [id number]  Order: [id number]   Collected 1/14/2025 10:45       Status: Final result       Visible to patient: Yes (seen)       Dx: Pilonidal cyst    0 Result Notes          Component   Resulting Agency   FINAL DIAGNOSIS   A. SKIN, \"PILONIDAL CYST\", EXCISION:  --DILATED FOLLICLE WITH PERIFOLLICULAR FIBROSIS, VASCULAR ECTASIA AND LYMPHOPLASMACYTIC INFLAMMATION     COMMENT: Although not specific, the findings are compatible with the clinical history of a pilonidal cyst.                  Impression: Doing well following  pilonidal cystectomy.       Had some  seepage but has resolved.  Removed   sutures        Discussed increasing activity level     Follow-up with me  as needed   "

## 2025-02-13 ENCOUNTER — APPOINTMENT (OUTPATIENT)
Dept: BEHAVIORAL HEALTH | Facility: CLINIC | Age: 35
End: 2025-02-13
Payer: MEDICARE

## 2025-02-13 DIAGNOSIS — F31.9 BIPOLAR 1 DISORDER (MULTI): ICD-10-CM

## 2025-02-13 DIAGNOSIS — R41.89 COGNITIVE IMPAIRMENT: ICD-10-CM

## 2025-02-13 DIAGNOSIS — F90.0 ATTENTION DEFICIT HYPERACTIVITY DISORDER (ADHD), PREDOMINANTLY INATTENTIVE TYPE: ICD-10-CM

## 2025-02-13 PROCEDURE — 90834 PSYTX W PT 45 MINUTES: CPT | Performed by: PSYCHOLOGIST

## 2025-02-13 NOTE — PROGRESS NOTES
8AM [address]3527 Indiv Psych for ADHD, bipolar I, executive/cognitive dysfunction (45 MIN, [phone]). Virtual. Supportive Therapy. Mood stable.  Patient discussed he is interviewing and hiring a new assistant.  Hopes to employ her for a few hours per week.  Mother and patient interviewed Stephanie, who works at [employer] and is a daughter of his .  Hopes that she can help and be organized and holding accountable for going to the gym.  Spent much of the time today discussing his dating situation with Noemí, who is a teacher living near [place].  Patient somewhat anxious about the situation.  We discussed the importance of taking it 1 step at a time and just enjoying the process versus figuring out where the relationship will end up.  He reported they will be going out tomorrow evening at [employer] [facility].  Continues to get to [employer], the [employer], and [employer]HEL you.  He reported he took yesterday evening for himself and encouraged him to continue to try to push to make new recipes. Next: 2 weeks.   Outcome: Left Message    Please transfer to Patient Outreach Team at [phone] when patient returns call.    WebIZ Checked & Epic Updated:  Yes/ NOT FOUND    Attempt # 1

## 2025-02-18 ENCOUNTER — TREATMENT (OUTPATIENT)
Dept: OCCUPATIONAL THERAPY | Facility: HOSPITAL | Age: 35
End: 2025-02-18
Payer: MEDICARE

## 2025-02-18 DIAGNOSIS — R41.3 MEMORY LOSS: ICD-10-CM

## 2025-02-18 DIAGNOSIS — R41.89 COGNITIVE IMPAIRMENT: Primary | ICD-10-CM

## 2025-02-18 PROCEDURE — 97535 SELF CARE MNGMENT TRAINING: CPT | Mod: GO | Performed by: OCCUPATIONAL THERAPIST

## 2025-02-18 ASSESSMENT — ACTIVITIES OF DAILY LIVING (ADL): HOME_MANAGEMENT_TIME_ENTRY: 59

## 2025-02-18 NOTE — PROGRESS NOTES
Occupational Therapy    Patient Name:Paige Ybarra  MRN:[id number]  Today's Date:2025  Referred by: Herminia Angulo  Time Calculation  Start Time: 901    Therapy Diagnosis  Problem List Items Addressed This Visit             ICD-10-CM    Cognitive impairment - Primary R41.89    Memory loss R41.3       Insurance  Visit number: 16 (visit 3 of this year)  Approved number of visits: [place]  Auth required: No  Authorization date range:   Onset Date: 2024  Medicare Certification Period:  Beginnin/5/2025            Endin25  Payor: MEDICARE / Plan: MEDICARE PART A AND B / Product Type: *No Product type* /     Precautions/Fall Risk:  Fall Risk: Low based on  STEADI  Pacemaker: no    Seizures: No      Subjective/Pain:  Patient reports that he hired a .   Current Pain Level (0-10): 0    HEP Compliance: Good    Objective/Outcome Measures:     Treatment  ADL/IADL:   minutes    Patient asked to develop outline of information he should share with  or provide training on.  He identified the following:  Contract- including job description, expectations  Meeting with Sharon or Dr. Angulo  Teaching Stephanie how to interact/teach him  Education on TBI  Need for patience      Patient participated in editing of contract/job description for Stephanie his new  using paper pencil.   Next steps:  - Edit electronic contract  - change name from Renato to Stephanie  - change description from daily to weekly  - change pay rate  - change hours  - keep expectations the same  - look at home to see if you can find training guide from Renato  -try to figure out how you trained Renato on TBI and if not possible, how will you train Stephanie?  - create slide show on TBI  - determine when Stephanie will start  - set up meeting with Dr. Angulo/Sharon    Patient participated in identification of his TBI symptoms, limitations/strengths  - impaired planning  - likes definitive things  - impaired  memory   - impaired judgement  - impaired organization  - impaired self initiation  - more patient now  - good communication skills  - easily disappointed  - positive outlook  - well versed in reading/news  - can still use computer programs well  - friendly  Patient required moderate verbal cues and prompting questions to complete the above and for generation of solutions during problem solving situations.     Instructed in how to determine substitutions for cooking. Patient participated in determining substitutions for 7 recipes.  Patient used thought generation and use of internet to identify options. He required cues for internet use.     Home program/[employer]: Look up chicken cooking safety, complete steps identified today to move forward with  training    Assessment:     Patient has hired new  to help with accountability of personal goals. He will meet with her 2 hours a week. Assistant has no experience with this type of role and will need to be trained. Goal added this date for patient training of assistant. Adequate training will be necessary to optimize patient's independence.   Patient will work on next steps identified during today's session before he returns next week. Patient receptive to instruction on how to determine substitutions during meal prep. He will research safe chicken cooking techniques before next session.  Continued OT indicated to further improve meal planning and preparation and to address planning/training of new .    New goal added:   Patient will create training plan, schedule training meeting, and complete training of new  with use of compensatory cognitive techniques, planning tools, and problem solving tools.     Plan for next session:  provide training on kitchen safety techniques

## 2025-02-19 ENCOUNTER — APPOINTMENT (OUTPATIENT)
Dept: SURGERY | Facility: CLINIC | Age: 35
End: 2025-02-19
Payer: MEDICARE

## 2025-02-21 LAB
BASOPHILS # BLD AUTO: 43 CELLS/UL (ref 0–200)
BASOPHILS NFR BLD AUTO: 0.4 %
EOSINOPHIL # BLD AUTO: 97 CELLS/UL (ref 15–500)
EOSINOPHIL NFR BLD AUTO: 0.9 %
ERYTHROCYTE [DISTWIDTH] IN BLOOD BY AUTOMATED COUNT: 12.6 % (ref 11–15)
HCT VFR BLD AUTO: 39.7 % (ref 38.5–50)
HGB BLD-MCNC: 12.5 G/DL (ref 13.2–17.1)
LYMPHOCYTES # BLD AUTO: 2106 CELLS/UL (ref 850–3900)
LYMPHOCYTES NFR BLD AUTO: 19.5 %
MCH RBC QN AUTO: 28.3 PG (ref 27–33)
MCHC RBC AUTO-ENTMCNC: 31.5 G/DL (ref 32–36)
MCV RBC AUTO: 90 FL (ref 80–100)
MONOCYTES # BLD AUTO: 508 CELLS/UL (ref 200–950)
MONOCYTES NFR BLD AUTO: 4.7 %
NEUTROPHILS # BLD AUTO: 8046 CELLS/UL (ref 1500–7800)
NEUTROPHILS NFR BLD AUTO: 74.5 %
PLATELET # BLD AUTO: 277 THOUSAND/UL (ref 140–400)
PMV BLD REES-ECKER: 12 FL (ref 7.5–12.5)
RBC # BLD AUTO: 4.41 MILLION/UL (ref 4.2–5.8)
WBC # BLD AUTO: 10.8 THOUSAND/UL (ref 3.8–10.8)

## 2025-02-24 ENCOUNTER — TELEPHONE (OUTPATIENT)
Dept: BEHAVIORAL HEALTH | Facility: CLINIC | Age: 35
End: 2025-02-24
Payer: MEDICARE

## 2025-02-24 DIAGNOSIS — F31.70 BIPOLAR AFFECTIVE DISORDER IN REMISSION (MULTI): ICD-10-CM

## 2025-02-24 RX ORDER — CLOZAPINE 200 MG/1
TABLET ORAL
Qty: 90 TABLET | Refills: 3 | Status: SHIPPED | OUTPATIENT
Start: 2025-02-24

## 2025-02-24 RX ORDER — CLOZAPINE 25 MG/1
25 TABLET ORAL 2 TIMES DAILY
Qty: 60 TABLET | Refills: 3 | Status: SHIPPED | OUTPATIENT
Start: 2025-02-24

## 2025-02-25 ENCOUNTER — TREATMENT (OUTPATIENT)
Dept: OCCUPATIONAL THERAPY | Facility: HOSPITAL | Age: 35
End: 2025-02-25
Payer: MEDICARE

## 2025-02-25 DIAGNOSIS — R41.3 MEMORY LOSS: ICD-10-CM

## 2025-02-25 DIAGNOSIS — R27.8 COORDINATION IMPAIRMENT: Primary | ICD-10-CM

## 2025-02-25 PROCEDURE — 97535 SELF CARE MNGMENT TRAINING: CPT | Mod: GO | Performed by: OCCUPATIONAL THERAPIST

## 2025-02-25 ASSESSMENT — ACTIVITIES OF DAILY LIVING (ADL): HOME_MANAGEMENT_TIME_ENTRY: 68

## 2025-02-25 NOTE — PROGRESS NOTES
Occupational Therapy    Patient Name:Paige Ybarra  MRN:[id number]  Today's Date:2025  Referred by: Herminia Angulo  Time Calculation  Start Time: 1053  Stop Time: 1201  Time Calculation (min): 68 min    Therapy Diagnosis  Problem List Items Addressed This Visit             ICD-10-CM    Memory loss R41.3    Coordination impairment - Primary R27.8       Insurance  Visit number: 17 (4 of this year)  Approved number of visits: MN  Auth required: No  Authorization date range: N/A  Onset Date: 2024  Medicare Certification Period:  Beginnin/5/2025            Endin25  Payor: MEDICARE / Plan: MEDICARE PART A AND B / Product Type: *No Product type* /     Precautions/Fall Risk:  Fall Risk: Low based on  STEADI  Pacemaker: no    Seizures: No      Subjective/Pain:  Current Pain Level (0-10): 0    HEP Compliance: Good    Objective/Outcome Measures:     Treatment  ADL/IADL: 68 minutes  Patient participated in simulation of training session for new . He used power point presentation to review the following: brain injury, specifics of his brain injury, co morbidities, job description, strategy use, his current tool kit, and resources. Patient was asked multiple questions that someone in training might ask. He required min assist to answer questions appropriately. Patient identified confidence/preparedness rating of 8/10 after simulation.     Provided instruction on My plate. Patient instructed in use of portions of plate to help gauge  appropriate amounts of vegetables, fruits, grains, proteins. Printed simple start and my plate plan for patient. He demonstrated decreased knowledge of what different food categories include. My plate plan provides short description.  Patient participated in food category identification using this tool. He performed with approximately 65% accuracy.     Cooking meat:  Patient looked up chicken preparation as requested. He identified need to cook meat well  and to use meal thermometer. Cooking not performed due to time constraints.     Home program/[facility]: Review your training plan so that you can easily answer questions, reference my plate worksheets when meal planning    Assessment:     Patient demonstrated good ability to work through training materials for new aide, He required use of power point for memory prompting.  Patient also required assistance for problem solving when asked a question.  Training of personal aide necessary to increase independence, follow through and to over see and help coordination shopping, volunteering, meal planning. Patient demonstrates decreased awareness of healthy eating and had difficulty identifying food categories. He will require additional instruction on food categories and use of my plate.     Plan for next session:  training session with patient and new personal aide to maximize aides ability to facilitate independence, knowledge of compensatory tools, knowledge of brain injury

## 2025-02-27 ENCOUNTER — TELEMEDICINE (OUTPATIENT)
Facility: CLINIC | Age: 35
End: 2025-02-27
Payer: MEDICARE

## 2025-02-27 ENCOUNTER — APPOINTMENT (OUTPATIENT)
Dept: BEHAVIORAL HEALTH | Facility: CLINIC | Age: 35
End: 2025-02-27
Payer: MEDICARE

## 2025-02-27 DIAGNOSIS — F31.9 BIPOLAR 1 DISORDER (MULTI): ICD-10-CM

## 2025-02-27 DIAGNOSIS — R41.844 FRONTAL LOBE AND EXECUTIVE FUNCTION DEFICIT: ICD-10-CM

## 2025-02-27 DIAGNOSIS — Z87.820 HISTORY OF TRAUMATIC BRAIN INJURY: ICD-10-CM

## 2025-02-27 DIAGNOSIS — F90.0 ATTENTION DEFICIT HYPERACTIVITY DISORDER (ADHD), PREDOMINANTLY INATTENTIVE TYPE: ICD-10-CM

## 2025-02-27 DIAGNOSIS — R41.89 COGNITIVE IMPAIRMENT: Primary | ICD-10-CM

## 2025-02-27 DIAGNOSIS — R41.3 MEMORY CHANGES: ICD-10-CM

## 2025-02-27 DIAGNOSIS — R41.89 COGNITIVE IMPAIRMENT: ICD-10-CM

## 2025-02-27 PROCEDURE — 96158 HLTH BHV IVNTJ INDIV 1ST 30: CPT | Mod: 95 | Performed by: PSYCHOLOGIST

## 2025-02-27 PROCEDURE — 96159 HLTH BHV IVNTJ INDIV EA ADDL: CPT | Mod: 95 | Performed by: PSYCHOLOGIST

## 2025-02-27 PROCEDURE — 96158 HLTH BHV IVNTJ INDIV 1ST 30: CPT | Performed by: PSYCHOLOGIST

## 2025-02-27 PROCEDURE — 90834 PSYTX W PT 45 MINUTES: CPT | Performed by: PSYCHOLOGIST

## 2025-02-27 PROCEDURE — 96159 HLTH BHV IVNTJ INDIV EA ADDL: CPT | Performed by: PSYCHOLOGIST

## 2025-02-27 NOTE — PROGRESS NOTES
8AM [address] [facility] Psych for ADHD, bipolar I, executive/cognitive dysfunction (45 MIN, [phone]). Virtual. Supportive Therapy. Mood stable.  patient mentioned that he needs to get back to exercising.  Would like to lose some weight.  Has a hard time getting motivated.  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  Still dating the person he met online.  Still talking to some other people online.  Discussed the political landscape.  His mother is encouraged him to get back to his organizational system.  He did hire a  and apparently she needs to go through some training first.  Reading a book, oJhnny, from ana Terrell's perspective. Next: 2 weeks.

## 2025-02-27 NOTE — PROGRESS NOTES
"Neuropsychology Note    Name: Paige Ybarra    Date of Service: 02/27/2025     Reason For Visit: Neuropsychological Rehabilitation    Summary of Visit: Paige Ybarra is being seen for neuropsychological rehabilitation of cognitive and emotional changes resulting from severe traumatic brain injury. Paige has continued to make progress in hiring a  whose job will be to help him maintain his meaningful activities through regular check ins and support. He has interviewed and hired a person and has a plan to meet with her and with his OT to train her for the position. We discussed the need to flesh out details such as where to meet (he initially said they would meet in a common space at [facility], but after discussion changed this to meeting at his apartment). They need to work out days and hours, payment frequency and manner, and use of Neumind. He will contact his OT to update her and to talk about adding the new PA to his Neumind team so she can track and nudge his activities.   We also reviewed his progress in key life areas as follows:  Reading: Meets goal of reading a book with his  and reading news 10 minutes a day  4 pm rule: 95% at target; did look online today prior to 4 pm  Jobs: At goal of attending jobs 100% of the time this week  Cleaning: not quite up to \"Mom's standard\"; will clean before training session with PA  Cooking: not meeting goal of cooking once a week; working with OT on healthy eating  Exercise: not meeting exercise goal; plans to go to gym 3/week    Time: 4:02 pm - 5:01 pm    Next Session:  03/27/2025    "

## 2025-03-04 ENCOUNTER — APPOINTMENT (OUTPATIENT)
Dept: OCCUPATIONAL THERAPY | Facility: HOSPITAL | Age: 35
End: 2025-03-04
Payer: MEDICARE

## 2025-03-05 ENCOUNTER — TELEMEDICINE CLINICAL SUPPORT (OUTPATIENT)
Dept: OCCUPATIONAL THERAPY | Facility: HOSPITAL | Age: 35
End: 2025-03-05
Payer: MEDICARE

## 2025-03-05 DIAGNOSIS — R41.89 COGNITIVE IMPAIRMENT: Primary | ICD-10-CM

## 2025-03-05 DIAGNOSIS — R41.3 MEMORY LOSS: ICD-10-CM

## 2025-03-05 PROCEDURE — 97535 SELF CARE MNGMENT TRAINING: CPT | Mod: GO,95 | Performed by: OCCUPATIONAL THERAPIST

## 2025-03-05 ASSESSMENT — ACTIVITIES OF DAILY LIVING (ADL): HOME_MANAGEMENT_TIME_ENTRY: 120

## 2025-03-05 NOTE — PROGRESS NOTES
Occupational Therapy    Patient Name:Paige Ybarra  MRN:[id number]  Today's Date:3/5/2025  Referred by: Herminia Angulo  Time Calculation  Start Time:   Stop Time:   Time Calculation (min): 120 min    Therapy Diagnosis  Problem List Items Addressed This Visit             ICD-10-CM    Cognitive impairment - Primary R41.89    Memory loss R41.3       Insurance  Visit number: 18 (visit 5 of this year)  Approved number of visits: [place]  Auth required: No  Authorization date range: N/A  Onset Date: 2024  Medicare Certification Period:  Beginnin/5/2025            Endin25  Payor: MEDICARE / Plan: MEDICARE PART A AND B / Product Type: *No Product type* /     Virtual or Telephone Consent    An interactive audio and video telecommunication system which permits real time communications between the patient (at the originating site) and provider (at the distant site) was utilized to provide this telehealth service.   Verbal consent was requested and obtained from Paige Ybarra on this date, 25 for a telehealth visit and the patient's location was confirmed at the time of the visit.   Mom and new caregiver()present for session.    Precautions/Fall Risk:  Fall Risk: Low based on  STEADI  Pacemaker: no    Seizures: Yes  - well managed on medication      Subjective/Pain:  Current Pain Level (0-10): 0    HEP Compliance: Good    Objective/Outcome Measures:     Treatment  ADL/IADL: 120 minutes  Focus of session on training of new caregiver to promote independence and follow through with cognitive strategies/tools.  Patient, mom- Tommy and new caregiver Stephanie present.     Reviewed patient's current cognitive toolkit and developed additional goals/actions for all members present.  Medication- medisafe- is effective  Meal prep-   Healthy eating- education on my plate  Finances- bills on auto pay, purchases gift cards for small purchases, patient also manages small amounts of cash,  "limitations- court does not allow patient to have a credit card in his name or a debit card,   Daily schedule of activities- Stephanie involved in weekly schedule creation    Transportation- using paratransit and public transport(one able to ride straight route with no transfer)  New goal- learning how to transfer using bus  Has to use [employer] perfect calendar to manage his paratransit rides    Stephanie- nudge taking pictures for memory  Nudge- week review and planning  Nudge- notebook review  Nudge- cleaning      [place]- add reminder in neumind to check notes  Update toolkit to include new goals for meaningful activity- add tennis, yoga, rowing    Patient completed apartment tour, describing each room and what he accomplishes in them.     Patient completed \"An introduction to Paige\" with prompting and cues necessary from OT.  OT also provided additional instruction throughout presentation.  Content areas included:    Instruction on brain injury  Specifics on Paige's brain injury and medical history  Notify mom if seizure observed or if depression/eyad occurs, 911 if serious event  [place]'s m[facility]der[facility] activities  Medications  Positive outcomes  Negative outcomes  Strategies    Use of neumind- Provided instruction on role of Neumind in Paige's compensatory tools  and provided instruction on the following:   viewing [place]'s entries- recommend use of reminders for day/time and avoidance of use of tasks  patient modeled reminder entry, caregiver receptive to instruction on entering reminders for patient/monitoring reminder completion    Determination of caregiver schedule also completed.     Home program/[employer]: take notes as you start meeting with Stephanie on areas requiring further instruction and on areas of difficulty/success    Assessment:   Patient has been having difficulty with self initiation and follow through even with use of compensatory tools/techniques. He does best when accountable to another person.  Decision made by " patient and mom to hire caregiver() to help with these problem areas. Training of  completed this date. Additional training may be necessary.    Plan for next session:  monitor success of new caregiver and need for additional training, healthy eating instruction/meal planning

## 2025-03-06 ENCOUNTER — APPOINTMENT (OUTPATIENT)
Dept: BEHAVIORAL HEALTH | Facility: CLINIC | Age: 35
End: 2025-03-06
Payer: MEDICARE

## 2025-03-11 ENCOUNTER — TREATMENT (OUTPATIENT)
Dept: OCCUPATIONAL THERAPY | Facility: HOSPITAL | Age: 35
End: 2025-03-11
Payer: MEDICARE

## 2025-03-11 DIAGNOSIS — R41.89 COGNITIVE IMPAIRMENT: Primary | ICD-10-CM

## 2025-03-11 DIAGNOSIS — R41.3 MEMORY LOSS: ICD-10-CM

## 2025-03-11 PROCEDURE — 97535 SELF CARE MNGMENT TRAINING: CPT | Mod: GO | Performed by: OCCUPATIONAL THERAPIST

## 2025-03-11 ASSESSMENT — ACTIVITIES OF DAILY LIVING (ADL): HOME_MANAGEMENT_TIME_ENTRY: 58

## 2025-03-11 NOTE — PROGRESS NOTES
Occupational Therapy    Patient Name:Paige Ybarra  MRN:[id number]  Today's Date:3/11/2025  Referred by: Herminia Angulo  Time Calculation  Start Time: 1502  Stop Time: 1600  Time Calculation (min): 58 min    Therapy Diagnosis  Problem List Items Addressed This Visit             ICD-10-CM    Cognitive impairment - Primary R41.89    Memory loss R41.3       Insurance  Visit number: 199 visit 6 of this year)  Approved number of visits: MN  Auth required: No  Authorization date range: N/A  Onset Date: 2024  Medicare Certification Period:  Beginning:     3/11/2025            Endin25  Payor: MEDICARE / Plan: MEDICARE PART A AND B / Product Type: *No Product type* /     Precautions/Fall Risk:  Fall Risk: Low based on  STEADI  Pacemaker: no    Seizures: Yes  -well managed on medication    Subjective/Pain:  Patient had first meeting with his personal assitant. She created a weekly checklist based on last week's training to review patients goals and planning needs including:  Schedule planning  Meal planning  Budgeting  Exercise schedule  Reading schedule  Medication schedule  Cleaning schedule  Transportation schedule  And ongoing goals  Current Pain Level (0-10): 0    HEP Compliance: Good    Objective/Outcome Measures:     Treatment  ADL/IADL: 58 minutes  Patient instructed in My Plate using legos for visual guide on portions and parts of meal  He participated in determining food categories.  Patient incorrectly identified only 3 of 40 foods.    He participated in analysis of breakfast and lunch meals and identified how to make each more in line with my plate guidelines.  Patient also looked at meal planning for the week and identified that his plan is missing vegetables.     Patient given list of meal items such as spinach pasta bake and asked to identify food categories and how to make meal more consistent with my plate.  Patient required min verbal cues to complete this task.     Patient has started to use  Apple calendar, neumind and [employer] calendar  Recommended he stick with one. Patient selected neumind       Home program/[employer]:   Track your daily servings of vegetables/fruit, use my plate image when planning meals    Assessment:     Patient has begun to use multiple electronic memory aides which affect his ability to be accurate and causes much difficulty with cohesiveness.  Appointments and tasks are missing from each method.  Recommend patient return to use of single electronic memory system. He selected use of [employer]oying based on the fact that caregiver has ability to monitor his actions/successes using this savage.  He demonstrated improved understanding of My plate guidelines and recommended food category sizes after instruction with legos.  Patient able to identify how to make meals more complaint with My plate with some cueing.   Recommended patient look at serving size when counting categories instead of assuming that if a food contains a small portion of that category it should count as a serving- ex: fruit in the bottom of yogurt is not enough to make a serving and additional fruit would be necessary.     Plan for next session:  continue to work toward established goals.

## 2025-03-13 ENCOUNTER — APPOINTMENT (OUTPATIENT)
Dept: BEHAVIORAL HEALTH | Facility: CLINIC | Age: 35
End: 2025-03-13
Payer: MEDICARE

## 2025-03-13 ENCOUNTER — OFFICE VISIT (OUTPATIENT)
Dept: BEHAVIORAL HEALTH | Facility: CLINIC | Age: 35
End: 2025-03-13
Payer: MEDICARE

## 2025-03-13 VITALS
HEART RATE: 92 BPM | DIASTOLIC BLOOD PRESSURE: 85 MMHG | HEIGHT: 69 IN | SYSTOLIC BLOOD PRESSURE: 124 MMHG | TEMPERATURE: 98.6 F | BODY MASS INDEX: 27.73 KG/M2 | OXYGEN SATURATION: 97 % | WEIGHT: 187.2 LBS

## 2025-03-13 DIAGNOSIS — R41.844 EXECUTIVE FUNCTION DEFICIT: ICD-10-CM

## 2025-03-13 DIAGNOSIS — R41.89 COGNITIVE IMPAIRMENT: ICD-10-CM

## 2025-03-13 DIAGNOSIS — F41.9 ANXIETY: ICD-10-CM

## 2025-03-13 DIAGNOSIS — F31.9 BIPOLAR 1 DISORDER (MULTI): ICD-10-CM

## 2025-03-13 PROCEDURE — 3079F DIAST BP 80-89 MM HG: CPT | Performed by: PSYCHIATRY & NEUROLOGY

## 2025-03-13 PROCEDURE — 3008F BODY MASS INDEX DOCD: CPT | Performed by: PSYCHIATRY & NEUROLOGY

## 2025-03-13 PROCEDURE — 3074F SYST BP LT 130 MM HG: CPT | Performed by: PSYCHIATRY & NEUROLOGY

## 2025-03-13 PROCEDURE — 99213 OFFICE O/P EST LOW 20 MIN: CPT | Performed by: PSYCHIATRY & NEUROLOGY
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
## 2025-03-13 NOTE — PATIENT INSTRUCTIONS
Plan:   - Continue current medications:   Buspirone 5mg in the morning, 10mg in the evening  Clozapine 225mg (200mg +25mg) in AM, 425mg (two 200mg + 25mg) at night  Lithium 450mg in the morning; 600mg in the evening.   Gabapentin 400mg tid.   Folic acid 1mg daily.  - May take melatonin 5 to 10mg only as needed for sleep.   - Monthly blood draws for CBC for clozapine to monitor for agranulocytosis.   - Continue cognitive rehab and psychotherapy.  - Continue occupational therapy.   - Continue healthy lifestyle.   - Follow up in 3 months.   - Contact sooner if needed.

## 2025-03-14 LAB
BASOPHILS # BLD AUTO: 43 CELLS/UL (ref 0–200)
BASOPHILS NFR BLD AUTO: 0.4 %
EOSINOPHIL # BLD AUTO: 161 CELLS/UL (ref 15–500)
EOSINOPHIL NFR BLD AUTO: 1.5 %
ERYTHROCYTE [DISTWIDTH] IN BLOOD BY AUTOMATED COUNT: 12.6 % (ref 11–15)
HCT VFR BLD AUTO: 38.8 % (ref 38.5–50)
HGB BLD-MCNC: 12.5 G/DL (ref 13.2–17.1)
LYMPHOCYTES # BLD AUTO: 2675 CELLS/UL (ref 850–3900)
LYMPHOCYTES NFR BLD AUTO: 25 %
MCH RBC QN AUTO: 28 PG (ref 27–33)
MCHC RBC AUTO-ENTMCNC: 32.2 G/DL (ref 32–36)
MCV RBC AUTO: 87 FL (ref 80–100)
MONOCYTES # BLD AUTO: 653 CELLS/UL (ref 200–950)
MONOCYTES NFR BLD AUTO: 6.1 %
NEUTROPHILS # BLD AUTO: 7169 CELLS/UL (ref 1500–7800)
NEUTROPHILS NFR BLD AUTO: 67 %
PLATELET # BLD AUTO: 248 THOUSAND/UL (ref 140–400)
PMV BLD REES-ECKER: 11.7 FL (ref 7.5–12.5)
RBC # BLD AUTO: 4.46 MILLION/UL (ref 4.2–5.8)
WBC # BLD AUTO: 10.7 THOUSAND/UL (ref 3.8–10.8)

## 2025-03-25 ENCOUNTER — TREATMENT (OUTPATIENT)
Dept: OCCUPATIONAL THERAPY | Facility: HOSPITAL | Age: 35
End: 2025-03-25
Payer: MEDICARE

## 2025-03-25 DIAGNOSIS — R41.89 COGNITIVE IMPAIRMENT: Primary | ICD-10-CM

## 2025-03-25 DIAGNOSIS — R41.3 MEMORY LOSS: ICD-10-CM

## 2025-03-25 PROCEDURE — 97535 SELF CARE MNGMENT TRAINING: CPT | Mod: GO | Performed by: OCCUPATIONAL THERAPIST

## 2025-03-25 ASSESSMENT — ACTIVITIES OF DAILY LIVING (ADL): HOME_MANAGEMENT_TIME_ENTRY: 49

## 2025-03-25 NOTE — PROGRESS NOTES
Occupational Therapy    Patient Name:Paige Ybarra  MRN:[id number]  Today's Date:3/25/2025  Referred by: Herminia Angulo  Time Calculation  Start Time: 1611  Stop Time: 1700  Time Calculation (min): 49 min    Therapy Diagnosis  Problem List Items Addressed This Visit             ICD-10-CM    Cognitive impairment - Primary R41.89    Memory loss R41.3       Insurance  Visit number: 20 (visit 7 of this year)  Approved number of visits: MN  Auth required: No  Authorization date range: N/A  Onset Date: 2024  Medicare Certification Period:  Beginnin/5/25            Endin25  Payor: MEDICARE / Plan: MEDICARE PART A AND B / Product Type: *No Product type* /     Precautions/Fall Risk:  Fall Risk: Low based on  STEADI  Pacemaker: no    Seizures: Yes  - well managed on medication      Subjective/Pain:  Patient reports meeting with  2x a week -reviews checklist.  Reviews what he should buy, but is not writing down menu or shopping list. Meeting days are fluid.    Current Pain Level (0-10): 0    HEP Compliance: Good    Objective/Outcome Measures:     Treatment  ADL/IADL: 49  minutes  Worked with patient to developed meal planning plan:  Grocery shop on Sundays  Meal planning on Friday  Put meal plan on  for easy access    Patient will write days of week in notebook and list dinner for each day. He will enter ingredients he needs to buy into his phone in notes section.     Patient participated in meal planning using notebook to list dinner plans and used phone to list ingredients. He required min assist for problem solving.     Memory system:  Patient has returned to using multiple tools for memory and is demonstrating inconsistent performance/recall.  Provided re-eduction on need for consistency and how multiple systems can easily fail.  He does not put appointments in his calendar, recommended he add these.  Provided instruction on how to add appointments to calendar using My chart.   Recommended patient discuss his resistance/dislike for Neumind with mom and Dr. Angulo since this was his preferred tool and allows new  to view his progress.      Home program/Medbridge: discuss use of google calendar and dislike or Neumind with mom and Dr. Angulo    Assessment:   Patient has declined in memory system use bring back additional tools, he is looking at my chart multiple times a day, using google calendar, using Neumind,  and using multiple written sources.   Memory system needs to be consistent and much more simple in order to be effective. He reports positive outcomes with new personal assistance, but has not been meal planning or grocery shopping.  Provided instruction on benefit of routine.  Patient developed plan for meal planning including meal planning on Fridays and shopping on Sundays. He was able to identify appropriate meals and ingredients necessary, but required min assist for problem solving.     Plan for next session:  continue to address memory system and meal planning

## 2025-03-27 ENCOUNTER — APPOINTMENT (OUTPATIENT)
Dept: BEHAVIORAL HEALTH | Facility: CLINIC | Age: 35
End: 2025-03-27
Payer: MEDICARE

## 2025-03-27 ENCOUNTER — OFFICE VISIT (OUTPATIENT)
Facility: CLINIC | Age: 35
End: 2025-03-27
Payer: MEDICARE

## 2025-03-27 DIAGNOSIS — R41.89 COGNITIVE IMPAIRMENT: ICD-10-CM

## 2025-03-27 DIAGNOSIS — Z87.820 HISTORY OF TRAUMATIC BRAIN INJURY: ICD-10-CM

## 2025-03-27 DIAGNOSIS — R41.844 FRONTAL LOBE AND EXECUTIVE FUNCTION DEFICIT: ICD-10-CM

## 2025-03-27 DIAGNOSIS — F90.0 ATTENTION DEFICIT HYPERACTIVITY DISORDER (ADHD), PREDOMINANTLY INATTENTIVE TYPE: ICD-10-CM

## 2025-03-27 DIAGNOSIS — R41.89 COGNITIVE IMPAIRMENT: Primary | ICD-10-CM

## 2025-03-27 DIAGNOSIS — F31.9 BIPOLAR 1 DISORDER (MULTI): ICD-10-CM

## 2025-03-27 DIAGNOSIS — R41.3 MEMORY CHANGES: ICD-10-CM

## 2025-03-27 PROCEDURE — 90834 PSYTX W PT 45 MINUTES: CPT | Performed by: PSYCHOLOGIST

## 2025-03-27 PROCEDURE — 96158 HLTH BHV IVNTJ INDIV 1ST 30: CPT | Performed by: PSYCHOLOGIST

## 2025-03-27 PROCEDURE — 96159 HLTH BHV IVNTJ INDIV EA ADDL: CPT | Performed by: PSYCHOLOGIST

## 2025-03-27 NOTE — PROGRESS NOTES
Dictated on Pearescope software  8AM [address] Ind[facility] Psych for ADHD, bipolar I, executive/cognitive dysfunction (45 MIN, [phone]). Virtual. Supportive Therapy. Mood stable.  Patient doing well overall.  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, working at the food bank, and getting to [employer].  He has gotten a , Stephanie, who is the daughter of his .  Employees are approximately 2 hours/week and is early into this process.  He hopes that she will be able to help him with meal preparation for this coming week.  Patient reported that the woman he went on to few dates with, Noemí, tells him that she needs to focus on her schooling and is not interested in dating.  She indicated to patient that they can just be friends.  He had mixed feelings about her son and said he was okay with that.  He indicated though he did not feel SPARC.  Went with coffee with another woman but did not click.  He indicated that he is looking for a romantic relationship to help with loneliness.  We spent some time today discussing the importance of developing friendships and increasing his involvement in activities where he might meet others.  Getting to the head injury group online on Monday evenings.  Continues to do daily reading, currently awaiting side her house rules.  He reported that he is now the uncle of the recent addition to the family, his sisters child.  Discussed some anxiety about the political landscape.  Next: 2 weeks.

## 2025-03-27 NOTE — PROGRESS NOTES
Neuropsychology Note    Name: Paige Ybarra    Date of Service: 03/27/2025     Reason For Visit: Neuropsychological Rehabilitation    Summary of Visit:  Paige Ybarra is being seen for neuropsychological rehabilitation of cognitive and emotional changes resulting from severe traumatic brain injury. His mother joined remotely for a short while. Paige has made progress in hiring a  to meet with him twice a week for up to an hour each time to review his weekly activities and hold him accountable for meeting weekly goals. They have now started meeting, and Paige is very happy with how it is going. She created a folder with a weekly checklist for him that we reviewed. We expanded two of the sections, exercise and meal planning to be more detailed and specific (I.e., frozen pizza does not count as a healthy jamir, but homemade pizza with lots of vegetables could count). We also added the 4 pm rule into the checklist (that is, he cannot use social media or dating sites before 4 pm as it could detract from his other activities). We are moving the accountability role to his new PA and fading out the need for professionals to be involved, in order to meet our aim of sustained independence for Paige.    Time: 03/27/2025    Next Session:  04/24/2025

## 2025-03-28 DIAGNOSIS — F31.70 BIPOLAR AFFECTIVE DISORDER IN REMISSION (MULTI): ICD-10-CM

## 2025-03-28 RX ORDER — LITHIUM CARBONATE 300 MG/1
TABLET, FILM COATED, EXTENDED RELEASE ORAL
Qty: 56 TABLET | Refills: 10 | Status: SHIPPED | OUTPATIENT
Start: 2025-03-28

## 2025-04-08 ENCOUNTER — TREATMENT (OUTPATIENT)
Dept: OCCUPATIONAL THERAPY | Facility: HOSPITAL | Age: 35
End: 2025-04-08
Payer: MEDICARE

## 2025-04-08 DIAGNOSIS — R27.8 COORDINATION IMPAIRMENT: ICD-10-CM

## 2025-04-08 DIAGNOSIS — R41.89 COGNITIVE IMPAIRMENT: Primary | ICD-10-CM

## 2025-04-08 DIAGNOSIS — R56.9 SEIZURE (MULTI): ICD-10-CM

## 2025-04-08 DIAGNOSIS — R41.842 VISUOSPATIAL DEFICIT: ICD-10-CM

## 2025-04-08 PROCEDURE — 97530 THERAPEUTIC ACTIVITIES: CPT | Mod: GO | Performed by: OCCUPATIONAL THERAPIST

## 2025-04-08 PROCEDURE — 97535 SELF CARE MNGMENT TRAINING: CPT | Mod: GO,95 | Performed by: OCCUPATIONAL THERAPIST

## 2025-04-08 ASSESSMENT — ACTIVITIES OF DAILY LIVING (ADL): HOME_MANAGEMENT_TIME_ENTRY: 53

## 2025-04-08 NOTE — PROGRESS NOTES
Occupational Therapy    Patient Name:Paige Ybarra  MRN:[id number]  Today's Date:2025  Referred by: Herminia Angulo  Time Calculation  Start Time: 1453  Stop Time: 1605  Time Calculation (min): 72 min    Therapy Diagnosis  Problem List Items Addressed This Visit             ICD-10-CM    Cognitive impairment - Primary R41.89    Seizure (Multi) R56.9    Visuospatial deficit R41.842    Coordination impairment R27.8       Insurance  Visit number: 21 (visit 8 of this year)  [place]ved [place] of visits: [place]  Auth required: No  Authorization date range:   Onset Date: 2024  Medicare Certification Period:  Beginnin/5/25          Endin25  Payor: MEDICARE / Plan: MEDICARE PART A AND B / Product Type: *No Product type* /     Precautions/Fall Risk:  Fall Risk: Low based on  STEADI  Pacemaker: no    Seizures: Yes- well managed on medication      Subjective/Pain:  Current Pain Level (0-10): 0  Patient reports that he did make macaroni and cheese. He reports that he did forget that his meal plan was in his folder.   Patient reports pulling out a plant when he was supposed to pull off a tag.  He indicated that volunteer supervisor at [facility] asked him to speak with OT about this.     Patient reports that   HEP Compliance: Fair    Objective/Outcome Measures:     Treatment  ADL/IADL: 53 minutes  Patient asked to identify a plan to talk with  about meal plan and shopping.  Discussed that just the notebook does not work as a memory aide. Patient identified plan of adding reminder to his Neumind.  He started to enter reminder, but did not recall when he meets with his personal assistance. When asked how he could find out, patient indicated that he did not record meeting time/day.  Recommended he add look in book to his reminder as a visual cue to show her his notes. Provided instruction on benefit of social experiment. Patient asked to rate his confidence on possibility that  he will  complete talking to personal aide now that he has reminder in 2 places- his notes and Neumind. He indicated 75%.     Patient asked to identify a visual system for notebook that would cue him to complete a task such as a small square to check off. He decided on use of a star. He was also asked to identify plan to develop plan to check his notebook.  Patient set reminder for 5:00 tonight and repeated daily. Patient also put a habit tracker in his hello habit savage.    At end of session, patient and OT had phone conversation with patient's mom re: memory strategies, home program.           Therapeutic activity: 19 minutes  Right hand: 37.37 seconds  Left: 40.48 seconds  Spatial ability- 2 correct, 2 wrong, 1 skipped  Coordination home program instruction, patient participated in completion of paper clip chain to improve coordination,  patient added exercises to his Neumind to increase recall    Home program/[facility]: complete home program 3-5 times a week.  Discuss meal planning with personal aide.  Increase to nightly review of notebook to increase success with home program and follow through.     Assessment:   Patient demonstrated improvement in cooking, but did not progress in terms of meal planning and shopping. Patient with decline in memory system use. He will need to implement nightly notebook review in order to increase success with follow through of home program and tasks written in notebook.  Patient will also implement hanging of meal planning list  and exercise tracking on fridge.  Mom in support of recommendations provided today. 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 Anticipate that attention is also contributing factor.  Goal added today for visual perception and fine motor control.     Plan for next session:  continue to address functional performance, memory,  add coordination and visual skills

## 2025-04-10 ENCOUNTER — APPOINTMENT (OUTPATIENT)
Dept: BEHAVIORAL HEALTH | Facility: CLINIC | Age: 35
End: 2025-04-10
Payer: MEDICARE

## 2025-04-14 ENCOUNTER — APPOINTMENT (OUTPATIENT)
Dept: BEHAVIORAL HEALTH | Facility: CLINIC | Age: 35
End: 2025-04-14
Payer: MEDICARE

## 2025-04-14 DIAGNOSIS — F90.0 ATTENTION DEFICIT HYPERACTIVITY DISORDER (ADHD), PREDOMINANTLY INATTENTIVE TYPE: ICD-10-CM

## 2025-04-14 DIAGNOSIS — F31.9 BIPOLAR 1 DISORDER (MULTI): ICD-10-CM

## 2025-04-14 DIAGNOSIS — R41.89 COGNITIVE IMPAIRMENT: ICD-10-CM

## 2025-04-14 PROCEDURE — 90834 PSYTX W PT 45 MINUTES: CPT | Performed by: PSYCHOLOGIST

## 2025-04-14 NOTE — PROGRESS NOTES
Dictated on [employer] software  8AM 85520 Indiv Psych for ADHD, bipolar I, executive/cognitive dysfunction (45 MIN, [phone]). Virtual. Supportive Therapy. Mood stable.  Patient planning on spending time with his family over Easter this coming weekend.  Wants to lose weight and increase his running.  He brought up dating again and we discussed his try to get involved in other activities where he could make some friends as opposed to focusing solely on dating.  Decided to get the [employer] baseball package.  He continues to get the Swarm, and the [employer]. Next: 1 week.

## 2025-04-15 DIAGNOSIS — F31.70 BIPOLAR AFFECTIVE DISORDER IN REMISSION (MULTI): ICD-10-CM

## 2025-04-15 DIAGNOSIS — F41.9 ANXIETY: ICD-10-CM

## 2025-04-15 RX ORDER — LITHIUM CARBONATE 300 MG/1
TABLET, FILM COATED, EXTENDED RELEASE ORAL
Qty: 60 TABLET | Refills: 5 | Status: SHIPPED | OUTPATIENT
Start: 2025-04-15

## 2025-04-15 RX ORDER — BUSPIRONE HYDROCHLORIDE 10 MG/1
10 TABLET ORAL NIGHTLY
Qty: 30 TABLET | Refills: 5 | Status: SHIPPED | OUTPATIENT
Start: 2025-04-15

## 2025-04-15 RX ORDER — BUSPIRONE HYDROCHLORIDE 5 MG/1
5 TABLET ORAL DAILY
Qty: 30 TABLET | Refills: 5 | Status: SHIPPED | OUTPATIENT
Start: 2025-04-15

## 2025-04-15 RX ORDER — CLOZAPINE 25 MG/1
25 TABLET ORAL 2 TIMES DAILY
Qty: 60 TABLET | Refills: 3 | Status: SHIPPED | OUTPATIENT
Start: 2025-04-15

## 2025-04-15 RX ORDER — CLOZAPINE 200 MG/1
TABLET ORAL
Qty: 90 TABLET | Refills: 3 | Status: SHIPPED | OUTPATIENT
Start: 2025-04-15

## 2025-04-15 RX ORDER — LITHIUM CARBONATE 450 MG/1
450 TABLET ORAL EVERY MORNING
Qty: 30 TABLET | Refills: 5 | Status: SHIPPED | OUTPATIENT
Start: 2025-04-15 | End: 2025-10-12

## 2025-04-15 RX ORDER — FOLIC ACID 1 MG/1
1 TABLET ORAL DAILY
Qty: 30 TABLET | Refills: 11 | Status: SHIPPED | OUTPATIENT
Start: 2025-04-15

## 2025-04-15 RX ORDER — GABAPENTIN 400 MG/1
400 CAPSULE ORAL 3 TIMES DAILY
Qty: 90 CAPSULE | Refills: 5 | Status: SHIPPED | OUTPATIENT
Start: 2025-04-15

## 2025-04-16 LAB
BASOPHILS # BLD AUTO: 41 CELLS/UL (ref 0–200)
BASOPHILS NFR BLD AUTO: 0.5 %
EOSINOPHIL # BLD AUTO: 123 CELLS/UL (ref 15–500)
EOSINOPHIL NFR BLD AUTO: 1.5 %
ERYTHROCYTE [DISTWIDTH] IN BLOOD BY AUTOMATED COUNT: 13.7 % (ref 11–15)
HCT VFR BLD AUTO: 40.5 % (ref 38.5–50)
HGB BLD-MCNC: 12.9 G/DL (ref 13.2–17.1)
LYMPHOCYTES # BLD AUTO: 2206 CELLS/UL (ref 850–3900)
LYMPHOCYTES NFR BLD AUTO: 26.9 %
MCH RBC QN AUTO: 27.9 PG (ref 27–33)
MCHC RBC AUTO-ENTMCNC: 31.9 G/DL (ref 32–36)
MCV RBC AUTO: 87.5 FL (ref 80–100)
MONOCYTES # BLD AUTO: 353 CELLS/UL (ref 200–950)
MONOCYTES NFR BLD AUTO: 4.3 %
NEUTROPHILS # BLD AUTO: 5478 CELLS/UL (ref 1500–7800)
NEUTROPHILS NFR BLD AUTO: 66.8 %
PLATELET # BLD AUTO: 242 THOUSAND/UL (ref 140–400)
PMV BLD REES-ECKER: 11.3 FL (ref 7.5–12.5)
RBC # BLD AUTO: 4.63 MILLION/UL (ref 4.2–5.8)
WBC # BLD AUTO: 8.2 THOUSAND/UL (ref 3.8–10.8)

## 2025-04-18 ENCOUNTER — APPOINTMENT (OUTPATIENT)
Dept: PRIMARY CARE | Facility: CLINIC | Age: 35
End: 2025-04-18
Payer: MEDICARE

## 2025-04-18 VITALS
OXYGEN SATURATION: 100 % | HEIGHT: 69 IN | SYSTOLIC BLOOD PRESSURE: 110 MMHG | BODY MASS INDEX: 27.99 KG/M2 | HEART RATE: 85 BPM | WEIGHT: 189 LBS | DIASTOLIC BLOOD PRESSURE: 84 MMHG

## 2025-04-18 DIAGNOSIS — E78.5 DYSLIPIDEMIA: ICD-10-CM

## 2025-04-18 DIAGNOSIS — Z00.00 ROUTINE GENERAL MEDICAL EXAMINATION AT HEALTH CARE FACILITY: Primary | ICD-10-CM

## 2025-04-18 DIAGNOSIS — S06.9X9D TRAUMATIC BRAIN INJURY WITH LOSS OF CONSCIOUSNESS, SUBSEQUENT ENCOUNTER: ICD-10-CM

## 2025-04-18 DIAGNOSIS — F31.9 BIPOLAR AFFECTIVE DISORDER, REMISSION STATUS UNSPECIFIED (MULTI): ICD-10-CM

## 2025-04-18 DIAGNOSIS — E55.9 VITAMIN D DEFICIENCY: ICD-10-CM

## 2025-04-18 DIAGNOSIS — E78.5 BORDERLINE HYPERLIPIDEMIA: ICD-10-CM

## 2025-04-18 DIAGNOSIS — Z13.6 SCREENING FOR CARDIOVASCULAR CONDITION: ICD-10-CM

## 2025-04-18 DIAGNOSIS — Z13.220 SCREENING FOR HYPERLIPIDEMIA: ICD-10-CM

## 2025-04-18 DIAGNOSIS — R56.9 SEIZURE (MULTI): ICD-10-CM

## 2025-04-18 PROCEDURE — 93000 ELECTROCARDIOGRAM COMPLETE: CPT | Performed by: INTERNAL MEDICINE

## 2025-04-18 PROCEDURE — 3008F BODY MASS INDEX DOCD: CPT | Performed by: INTERNAL MEDICINE

## 2025-04-18 PROCEDURE — 3074F SYST BP LT 130 MM HG: CPT | Performed by: INTERNAL MEDICINE

## 2025-04-18 PROCEDURE — G0439 PPPS, SUBSEQ VISIT: HCPCS | Performed by: INTERNAL MEDICINE

## 2025-04-18 PROCEDURE — 3079F DIAST BP 80-89 MM HG: CPT | Performed by: INTERNAL MEDICINE

## 2025-04-18 ASSESSMENT — ACTIVITIES OF DAILY LIVING (ADL)
GROCERY_SHOPPING: INDEPENDENT
DOING_HOUSEWORK: INDEPENDENT
BATHING: INDEPENDENT
MANAGING_FINANCES: INDEPENDENT
DRESSING: INDEPENDENT
TAKING_MEDICATION: INDEPENDENT

## 2025-04-18 ASSESSMENT — ENCOUNTER SYMPTOMS
DEPRESSION: 0
OCCASIONAL FEELINGS OF UNSTEADINESS: 0
LOSS OF SENSATION IN FEET: 0

## 2025-04-18 ASSESSMENT — PATIENT HEALTH QUESTIONNAIRE - PHQ9
1. LITTLE INTEREST OR PLEASURE IN DOING THINGS: NOT AT ALL
2. FEELING DOWN, DEPRESSED OR HOPELESS: NOT AT ALL
1. LITTLE INTEREST OR PLEASURE IN DOING THINGS: NOT AT ALL
2. FEELING DOWN, DEPRESSED OR HOPELESS: NOT AT ALL
SUM OF ALL RESPONSES TO PHQ9 QUESTIONS 1 AND 2: 0
SUM OF ALL RESPONSES TO PHQ9 QUESTIONS 1 AND 2: 0

## 2025-04-18 NOTE — ASSESSMENT & PLAN NOTE
He will continue Clozaril, buspirone, gabapentin and lithium.  He will also continue to follow-up with behavioral health.

## 2025-04-18 NOTE — ASSESSMENT & PLAN NOTE
He will continue vitamin D over-the-counter and will monitor vitamin D level.  Orders:    Vitamin D 25-Hydroxy,Total (for eval of Vitamin D levels); Future

## 2025-04-18 NOTE — PROGRESS NOTES
"Subjective   Reason for Visit: Paige Ybarra is an 34 y.o. male here for a Medicare Wellness visit.          Reviewed all medications by prescribing practitioner or clinical pharmacist (such as prescriptions, OTCs, herbal therapies and supplements) and documented in the medical record.    HPI patient presents to clinic for Medicare annual wellness exam and follow-up visit on history of traumatic brain injury, bipolar disorder, hypertension,epilepsey,dyslipidemia,vitamin d deficiency,suicide attempt in 2017,cognitive impairment cerumen impaction.  He is doing well and denies any headache, seizure activity, fever chills and abdominal pain.  EKG done shows and show sinus rhythm 82 bpm with normal axis normal interval without any ischemic changes.    Patient Care Team:  Syed Hong MD as PCP - General (Internal Medicine)  Syed Hong [facility]     Review of Systems   Constitutional: Negative.    HENT: Negative.     Eyes: Negative.    Respiratory: Negative.     Cardiovascular: Negative.    Gastrointestinal: Negative.    Endocrine: Negative.    Genitourinary: Negative.    Musculoskeletal: Negative.    Skin: Negative.    Allergic/Immunologic: Negative.    Neurological: Negative.    Hematological: Negative.    Psychiatric/Behavioral: Negative.         Objective   Vitals:  /84 (BP Location: Right arm, Patient Position: Sitting)   Pulse 85   Ht 1.753 m (5' 9\")   Wt 85.7 kg (189 lb)   SpO2 100%   BMI 27.91 kg/m²       Physical Exam  Constitutional:       Appearance: Normal appearance. He is normal weight.   HENT:      Right Ear: Tympanic membrane normal.      Left Ear: Tympanic membrane and ear canal normal.      Nose: Nose normal.   Neck:      Vascular: No carotid bruit.   Cardiovascular:      Rate and Rhythm: Normal rate.   Pulmonary:      Effort: No respiratory distress.      Breath sounds: No stridor. No wheezing.   Abdominal:      Palpations: Abdomen is soft.      " Tenderness: There is no abdominal tenderness. There is no guarding or rebound.   Skin:     Coloration: Skin is not jaundiced.      Findings: No bruising.   Neurological:      General: No focal deficit present.      Mental Status: He is alert and oriented to person, place, and time.   Psychiatric:         Mood and Affect: Mood normal.         Assessment & Plan  Routine general medical examination at health care facility  Patient will be scheduled for routine blood work.  He is up-to-date on immunization for health maintenance.  Orders:    1 Year Follow Up In Primary Care - Wellness Exam; Future    Borderline hyperlipidemia  He is advised to follow low-cholesterol diet.       Traumatic brain injury with loss of consciousness, subsequent encounter  He has not had any recurrence of headache or worsening of his seizure activity.       Seizure (Multi)  He will continue lacosamide regarding history of seizure disorder.       Dyslipidemia  To monitor lipid panel.  Orders:    Lipid Panel; Future    Bipolar affective disorder, remission status unspecified (Multi)  He will continue Clozaril, buspirone, gabapentin and lithium.  He will also continue to follow-up with behavioral health.       Vitamin D deficiency  He will continue vitamin D over-the-counter and will monitor vitamin D level.  Orders:    Vitamin D 25-Hydroxy,Total (for eval of Vitamin D levels); Future    Screening for cardiovascular condition  Will check EKG regarding screening for heart disease and use of high risk medications.  Orders:    ECG 12 Lead    Screening for hyperlipidemia  To check lipid panel  Orders:    Lipid Panel; Future

## 2025-04-20 ASSESSMENT — ENCOUNTER SYMPTOMS
EYES NEGATIVE: 1
RESPIRATORY NEGATIVE: 1
NEUROLOGICAL NEGATIVE: 1
HEMATOLOGIC/LYMPHATIC NEGATIVE: 1
PSYCHIATRIC NEGATIVE: 1
CONSTITUTIONAL NEGATIVE: 1
CARDIOVASCULAR NEGATIVE: 1
ALLERGIC/IMMUNOLOGIC NEGATIVE: 1
MUSCULOSKELETAL NEGATIVE: 1
GASTROINTESTINAL NEGATIVE: 1
ENDOCRINE NEGATIVE: 1

## 2025-04-22 ENCOUNTER — TREATMENT (OUTPATIENT)
Dept: OCCUPATIONAL THERAPY | Facility: HOSPITAL | Age: 35
End: 2025-04-22
Payer: MEDICARE

## 2025-04-22 DIAGNOSIS — R41.3 MEMORY LOSS: ICD-10-CM

## 2025-04-22 DIAGNOSIS — R41.89 COGNITIVE IMPAIRMENT: Primary | ICD-10-CM

## 2025-04-22 PROCEDURE — 97535 SELF CARE MNGMENT TRAINING: CPT | Mod: GO | Performed by: OCCUPATIONAL THERAPIST

## 2025-04-22 ASSESSMENT — ACTIVITIES OF DAILY LIVING (ADL): HOME_MANAGEMENT_TIME_ENTRY: 57

## 2025-04-22 NOTE — PROGRESS NOTES
"Occupational Therapy    Patient Name:Paige Ybarra  MRN:[id number]  Today's Date:2025  Referred by: Herminia Angulo  Time Calculation  Start Time: 1609  Stop Time: 1706  Time Calculation (min): 57 min    Therapy Diagnosis  Problem List Items Addressed This Visit           ICD-10-CM    Cognitive impairment - Primary R41.89    Memory loss R41.3       Insurance  Visit number: 22 (visit 9 of this year)  [place]ved [place] of visits: [place]  Auth required: No  Authorization date range: N/A  Onset Date: 2024  Medicare Certification Period:  Beginnin/5/25           Endin25  Payor: MEDICARE / Plan: MEDICARE PART A AND B / Product Type: *No Product type* /     Precautions/Fall Risk:  Fall Risk: Low based on  STEADI  Pacemaker: no    Seizures: Yes  - well managed on medication      Subjective/Pain:  \"I'm not using Neumind\" stated by patient when confronted about lack of follow through with home program. He indicates that he has not looked at Neumind with his .    Current Pain Level (0-10): 0    HEP Compliance: Poor,  Patient did not do meal planning or hand exercises. Did not put note on fridge as a reminder.     Objective/Outcome Measures:     Treatment  Patient has not completed his meal planning, shopping or exercises. Reviewed patient goals and need for home program in order to progress.  Patient contacted mom during session. Reviewed lack of follow through with home program, inconsistent use of memory strategies.  Provided instruction on alternatives to goal of independent meal planning/prep including use of ready made meals that can be microwaved, meals on wheels and pre-determined and delivered menu with little prep.  Discussed the fact that current memory system of notebook  and fluctuating between Neumind and Google calendar is not working effectively. Patient is also not using  as planned, avoiding discussion and reporting of performance for meal planning, " therapy exercises, and use of Neumind.   Provided instruction on need to discontinue OT if no progress is made.  Patient and mom requested continued OT, patient agreeable to increase focus on meal planning and preparation.  He demonstrated good understanding, that if he does not follow through with home program and recommendations, that therapy will be discontinued.  Mom in agreement as well.      ADL/IADL: 57 minutes  Reviewed patient responsibilities with patient and mom. These include-   cleaning, shopping, meal prep, laundry  Mom indicates that patient is not following through with cleaning, shopping, meal prep.    Worked with patient and mom to identify barriers to completing shopping, meal planning, and meal prep.  Mom indicates that patient is watching 3 hours of tv each night.  Recommended patient complete time study to allow visual representation of his time.     Created home program with patient:  Planning for cooking   -select meal to make and email me by tomorrow   - walk to grocery store and shop after dropped off by [employer]    - send update when meal is made    Memory strategy- discuss preference for [employer] with Dr. Angulo      Home program/[employer]: complete meal planning and prep plan, discuss memory system with Dr. Angulo, complete time study    Assessment:   Patient has demonstrated poor follow through with home program recently.  Anticipate that this is due to combination of decreased motivation, poor time management, inconsistent use of memory and planning system.  After martin discussion with patient and his mom, patient has decided to put forth the necessary effort to work toward independent performance of tasks required for independent living. He will select a consistent memory system to use and will complete a time study.  Patient and mom are aware of need for progress to continue OT.      Plan for next session:   Follow up  on how [employer] is working.  Follow up on meal  planning, time study, and meal prep.

## 2025-04-24 ENCOUNTER — APPOINTMENT (OUTPATIENT)
Dept: BEHAVIORAL HEALTH | Facility: CLINIC | Age: 35
End: 2025-04-24
Payer: MEDICARE

## 2025-04-24 ENCOUNTER — OFFICE VISIT (OUTPATIENT)
Facility: CLINIC | Age: 35
End: 2025-04-24
Payer: MEDICARE

## 2025-04-24 DIAGNOSIS — R41.89 COGNITIVE IMPAIRMENT: Primary | ICD-10-CM

## 2025-04-24 DIAGNOSIS — R41.3 MEMORY CHANGES: ICD-10-CM

## 2025-04-24 DIAGNOSIS — Z87.820 HISTORY OF TRAUMATIC BRAIN INJURY: ICD-10-CM

## 2025-04-24 DIAGNOSIS — R41.844 FRONTAL LOBE AND EXECUTIVE FUNCTION DEFICIT: ICD-10-CM

## 2025-04-24 PROCEDURE — 96159 HLTH BHV IVNTJ INDIV EA ADDL: CPT | Performed by: PSYCHOLOGIST

## 2025-04-24 PROCEDURE — 96158 HLTH BHV IVNTJ INDIV 1ST 30: CPT | Performed by: PSYCHOLOGIST

## 2025-04-24 NOTE — PROGRESS NOTES
Neuropsychology Note    Name: Paige Ybarra    Date of Service: 04/24/2025     Reason For Visit: Neuropsychological Rehabilitation    Summary of Visit:  Paige Ybarra is being seen for neuropsychological rehabilitation of cognitive and emotional changes resulting from severe traumatic brain injury. Paige's newly hired  Stephanie joined by phone for part of the session to review her work with Paige and to give her a broader framework of his rehabilitation. She is meeting regularly with him and reviewing a list of important activities during the week, such as work, exercise, meal planning, and so on. After she hung up, Paige said that he feels very good about having Stephanie work with him and that it is going well so far. He then said that he decided to review his therapy notebook from the beginning today, and doing so made him realize that we have worked on several issues repeatedly. He understands better now why he needs a PA to help him sustain his activities more consistently as we wind down our work together. Paige again said he prefers to use Google over [employer], but he admitted he has really not been using [employer] and did not know all of its features. He agreed to speak again with his OT about it. He said he continues to use the [employer] dating savage, but has not had any luck in meeting someone to date. He has been thinking about forming friendships istead. I again encouraged him to consider [employer] as a way to meet like-minded people without the pressure of making individual contacts.     Time: 4:00 pm - 4:58 pm    Next Session:  05/22/2025

## 2025-04-29 ENCOUNTER — TREATMENT (OUTPATIENT)
Dept: OCCUPATIONAL THERAPY | Facility: HOSPITAL | Age: 35
End: 2025-04-29
Payer: MEDICARE

## 2025-04-29 DIAGNOSIS — R41.842 VISUOSPATIAL DEFICIT: Primary | ICD-10-CM

## 2025-04-29 DIAGNOSIS — R41.3 MEMORY LOSS: ICD-10-CM

## 2025-04-29 DIAGNOSIS — R41.89 COGNITIVE IMPAIRMENT: ICD-10-CM

## 2025-04-29 PROCEDURE — 97535 SELF CARE MNGMENT TRAINING: CPT | Mod: GO | Performed by: OCCUPATIONAL THERAPIST

## 2025-04-29 ASSESSMENT — ACTIVITIES OF DAILY LIVING (ADL): HOME_MANAGEMENT_TIME_ENTRY: 56

## 2025-04-29 NOTE — PROGRESS NOTES
"Occupational Therapy    Patient Name:Paige Ybarra  MRN:[id number]  Today's Date:2025  Referred by: Herminia Angulo  Time Calculation  Start Time: 1704  Stop Time: 1800  Time Calculation (min): 56 min    Therapy Diagnosis  Problem List Items Addressed This Visit           ICD-10-CM    Cognitive impairment R41.89    Memory loss R41.3    Visuospatial deficit - Primary R41.842       Insurance  Visit number: 23 (visit 10 of this year)  [place]ved [place] of visits: [place]  Auth required: No  Authorization date range: N/A  Onset Date: 2024  Medicare Certification Period:  Beginnin/5/25           Endin25  Payor: MEDICARE / Plan: MEDICARE PART A AND B / Product Type: *No Product type* /     Precautions/Fall Risk:  Fall Risk: Low based on STEADI  Pacemaker: no    Seizures: Yes - well managed on medication      Subjective/Pain:  \"I did that but I did not enjoy it\" stated by patient about coordination exercises.   Patient reported,  \"I did do the time management study. I learned that I don't really love recording my time\"   I'm pretty good about not watching tv.  Patient indicates not having a lot of unstructured time.     \"I bought a slow cooker and stuff for slow cooker recipe\" stated by patient.    I need to ask you to do a deep dive on Neumind. I talked with mom and dr Angulo and that's what I need to use.   Current Pain Level (0-10): 0    HEP Compliance: Good    Objective/Outcome Measures:     Treatment  ADL/IADL: 56 minutes  Patient asked to identify what days shopping and cooking would be easier by looking at his time study.  He will cook Monday and Wednesday  and shop on Friday.     Patient initially indicating plan to meal plan on Monday, he required verbal cues to problem solve how he could shop before he completed meal planning.  Patient will complete meal planning on  prior to shopping.     Patient asked, \"What made you successful with meal planning, shopping, cooking this past " "week?\".  Patient indicates using his shopping list and creating a running grocery list. He was very concrete having difficulty self assessing.     Patient purchased new crock pot but indicates not knowing what he can cook it it. Recommend going to library for crock pot recipe cook book. Provided instruction on how to complete Google search to find easy crock pot and crock pot dump recipes. Patient able to complete search independently post instruction.     Safety -concerned about chicken liquid on counter and sink and safety- recommend antibacterial dishsoap or antibacterial cleaning solution. He also expressed concern over chicken being cooked thoroughly- recommended he use a thermometer to check temperature.    When performing note review, patient looked at the entire collection of notes from the start of therapy.  He stated, \"I realized that you and I  and Dr. Angulo go over the same things over and over and I don't follow through\" demonstrating improved insight.     Worked with patient to develop system for  his notebook. He will use the following:  to know when he completed a task in his notes= check araceli  Important items= use a star  Placed into Neumind= N    Patient required verbal cues to place his cooking, planning and shopping days into notebook and Neumind. He required cues to use new reminder, and required cues to enter appropriate day.  Patient instructed in how to make task repeat and how to repeat for multiple days.  He required assistance to problem solve if he should place cooking in Neumind or not.      Home program/[facility] [employer]:     Assessment:   Significant improvement in follow through with home program both for coordination, time study and IADL performance, as well as with interest in becoming independent. Time study with some gaps.  Difficult to assess accuracy of time study or if patient entered times according to what he thinks therapist and mom would like to see. Continue OT to address " memory and planning system, IADL, coordination/vision.     Plan for next session:  follow up on meal prep, planning, continue re-instruction on Neumind, coordination and vision exercise instruction as able

## 2025-05-06 ENCOUNTER — TREATMENT (OUTPATIENT)
Dept: OCCUPATIONAL THERAPY | Facility: HOSPITAL | Age: 35
End: 2025-05-06
Payer: MEDICARE

## 2025-05-06 DIAGNOSIS — R41.3 MEMORY LOSS: ICD-10-CM

## 2025-05-06 DIAGNOSIS — R56.9 SEIZURE (MULTI): ICD-10-CM

## 2025-05-06 DIAGNOSIS — F09 COGNITIVE DYSFUNCTION, ACQUIRED: Primary | ICD-10-CM

## 2025-05-06 DIAGNOSIS — R27.8 COORDINATION IMPAIRMENT: ICD-10-CM

## 2025-05-06 DIAGNOSIS — R41.842 VISUOSPATIAL DEFICIT: ICD-10-CM

## 2025-05-06 PROCEDURE — 97535 SELF CARE MNGMENT TRAINING: CPT | Mod: GO | Performed by: OCCUPATIONAL THERAPIST

## 2025-05-06 RX ORDER — LACOSAMIDE 150 MG/1
150 TABLET ORAL 2 TIMES DAILY
Qty: 60 TABLET | Refills: 5 | Status: SHIPPED | OUTPATIENT
Start: 2025-05-06 | End: 2025-05-08

## 2025-05-06 ASSESSMENT — ACTIVITIES OF DAILY LIVING (ADL): HOME_MANAGEMENT_TIME_ENTRY: 73

## 2025-05-06 NOTE — PROGRESS NOTES
"Occupational Therapy    Patient Name:Paige Ybarra  MRN:[id number]  Today's Date:2025  Referred by: Herminia Angulo  Time Calculation  Start Time: 1554  Stop Time: 1707  Time Calculation (min): 73 min    Therapy Diagnosis  Problem List Items Addressed This Visit           ICD-10-CM    Cognitive dysfunction, acquired - Primary F09    Memory loss R41.3    Visuospatial deficit R41.842    Coordination impairment R27.8       Insurance  Visit number: 24(11 of this year)  Approved number of visits: [place]  Auth required: No  Authorization date range:   Onset Date: 2024  Medicare Certification Period:  Beginnin2025            Endin25  Payor: MEDICARE / Plan: MEDICARE PART A AND B / Product Type: *No Product type* /     Precautions/Fall Risk:  Fall Risk: Low based on STEADI  Pacemaker: no    Seizures: Yes  - well managed on medication      Subjective/Pain:  Patient used his abbreviations in notebook to help with memory after completing meal planning, cooking.  Patient made chili. He cooked on May 3rd.  Patient indicates that he made enough chili to last awhile and froze some of it.     Patient asked, \"do you think I am doing ok with eating?\"      \"My mom and Dr. Angulo want me to do more with Neumind, it needs to be more tool of choice\" reported by patient.   Current Pain Level (0-10): 0    HEP Compliance: Good    Objective/Outcome Measures: Patient provided with outcome measures to complete and bring to next session.     Treatment  ADL/IADL:   minutes  Recommend patient eat 3x a day. He is eating 1-2 meals a day  at present. Patient asked to identify what he eats in a day.  He indicates that he does not remember most of the time.  Patient identified solution of tracking his food. Provided food and beverage diary listing breakfast, snacks, lunch and dinner.     Provided instruction on use of serving size card.      Patient indicates, \" I'm very confused- I've done almost everything in the " neurodivergent cookbook, what can I do now?  Provided re-instruction on use of library, internet, phone apps to build recipe bank.  Recommend patient use notebook to hold printouts of favorite recipes.    Created meal idea chart with vegetables, main course, dairy, carbs      Home program/[employer]: use idea list for meal planning, track food intake    Assessment:   Patient demonstrated increased follow through with home program and increased use of memory tools since last session. Patient expressing regret that he did not follow through and indicates that he is highly motivated to work toward independence.   Goals reviewed and updated this date.  Extend plan of care x 90 days 2-4 times a month.    Plan for next session:  monitor follow through with strategies, meal planning, meal prep,  schedule appointment with  to ensure follow through with Celine strategy use

## 2025-05-07 ENCOUNTER — APPOINTMENT (OUTPATIENT)
Dept: BEHAVIORAL HEALTH | Facility: CLINIC | Age: 35
End: 2025-05-07
Payer: MEDICARE

## 2025-05-07 DIAGNOSIS — R56.9 SEIZURE (MULTI): ICD-10-CM

## 2025-05-08 ENCOUNTER — APPOINTMENT (OUTPATIENT)
Dept: BEHAVIORAL HEALTH | Facility: CLINIC | Age: 35
End: 2025-05-08
Payer: MEDICARE

## 2025-05-08 DIAGNOSIS — R41.89 COGNITIVE IMPAIRMENT: ICD-10-CM

## 2025-05-08 DIAGNOSIS — F31.9 BIPOLAR 1 DISORDER (MULTI): ICD-10-CM

## 2025-05-08 DIAGNOSIS — F90.0 ATTENTION DEFICIT HYPERACTIVITY DISORDER (ADHD), PREDOMINANTLY INATTENTIVE TYPE: ICD-10-CM

## 2025-05-08 PROCEDURE — 90834 PSYTX W PT 45 MINUTES: CPT | Performed by: PSYCHOLOGIST

## 2025-05-08 RX ORDER — LACOSAMIDE 150 MG/1
150 TABLET ORAL 2 TIMES DAILY
Qty: 60 TABLET | Refills: 5 | Status: SHIPPED | OUTPATIENT
Start: 2025-05-08

## 2025-05-08 NOTE — PROGRESS NOTES
Dictated on [employer] software  8AM 17167 Indiv Psych for ADHD, bipolar I, executive/cognitive dysfunction (45 MIN, [phone]). Virtual. Supportive Therapy. Mood stable. Patient and I discussed Mother's Day plans.  Got his mother and sisters and grandmother and godmother card.  Finishes up at [employer] next Wednesday.  Missed work either today or yesterday when he overslept and reached out to his supervisor.  Is considering getting a cat.  Hopes to stay active physically in shape over the summer.  Continues to be focused on online dating.  Emphasized again good use of this time would be to try to establish relationship/friendships outside of romantic relationships so that he would have people to do social activities with.   Hopes to make a new recipe this weekend. Next: 3 weeks.

## 2025-05-13 ENCOUNTER — TREATMENT (OUTPATIENT)
Dept: OCCUPATIONAL THERAPY | Facility: HOSPITAL | Age: 35
End: 2025-05-13
Payer: MEDICARE

## 2025-05-13 DIAGNOSIS — R41.89 COGNITIVE IMPAIRMENT: Primary | ICD-10-CM

## 2025-05-13 DIAGNOSIS — R41.842 VISUOSPATIAL DEFICIT: ICD-10-CM

## 2025-05-13 DIAGNOSIS — R27.8 COORDINATION IMPAIRMENT: ICD-10-CM

## 2025-05-13 DIAGNOSIS — R41.3 MEMORY LOSS: ICD-10-CM

## 2025-05-13 PROCEDURE — 97535 SELF CARE MNGMENT TRAINING: CPT | Mod: GO | Performed by: OCCUPATIONAL THERAPIST

## 2025-05-13 ASSESSMENT — ACTIVITIES OF DAILY LIVING (ADL): HOME_MANAGEMENT_TIME_ENTRY: 56

## 2025-05-13 NOTE — PROGRESS NOTES
"Occupational Therapy    Patient Name:Paige Ybarra  MRN:[id number]  Today's Date:2025  Referred by: Herminia Angulo  Time Calculation  Start Time: 1609  Stop Time: 1705  Time Calculation (min): 56 min    Therapy Diagnosis  Problem List Items Addressed This Visit           ICD-10-CM    Cognitive impairment - Primary R41.89    Memory loss R41.3    Visuospatial deficit R41.842    Coordination impairment R27.8       Insurance  Visit number: 25(12 of this year)  Approved number of visits: [place]  Auth required: No  Authorization date range: N/A  Onset Date: 2024  Medicare Certification Period:  Beginnin/12/25            Endin25  Payor: MEDICARE / Plan: MEDICARE PART A AND B / Product Type: *No Product type* /     Precautions/Fall Risk:  Fall Risk: Low based on STEADI  Pacemaker: no    Seizures: Yes- well managed on medication    Subjective/Pain:  \"My Homework was to meal plan and to track food\", stated by patient    Current Pain Level (0-10): 0    HEP Compliance: Good, follow through improved    Objective/Outcome Measures:     Treatment  ADL/IADL:   minutes  Meals for week:   T- dinner only chili and rice  W breakfast only- bagel, yogurt, coffee  Thur- breakfast- coffee  Lunch- chickpea salad  Dinner- chili/salsa  Friday- breakfast- kimberley bar  Lunch-sub  Dinner snacks  Sat  Sun- lunch hand and cheese sandwich  Dinner leftovers  Monday  Breakfast coffee and donut  Lunch turkey and roast beef wrap    Reviewed meal planning plan  Thursday- meal plan  Fri- meet with Stephanie Dickerson- shopping  Cook Monday and Wednesday    Completed phone call with patient and  Stephanie. Reviewed progress, weekly review meetings with patient and , provided suggestions for helping to support patient accountability. She indicates focus on healthy meal and exercise recently. She indicates increased flexibility observed. She is noticing increased Neumind use and more green check marks    Patient " asked to rate his adherence to home program.  Self rating:  Food tracking 8/10  Meal planning:  patient unable to recall, patient indicates no place for his meal plan  Meal prep: patient unable to recall  Working out: unable to recall frequency, but feels he did all    Worked with patient to develop memory system for weekly goals.   Exercise- created new running/walking tracker in HipWay allowing patient to enter number of miles  Meal tracking-journal entry in NitroSellmind- list of foods  Meal planning- tracker yes/no response using tracker in Neumind  Keeping track of what I am cooking- ugichemd journal and set reminder  OT provided instruction on creating journal and trackers in NeuMoqomd. Creation of trackers and journal dependent overall.    Patient will focus on use of trackers and journal.  He demonstrated good understanding of entry process.       Home program/Medbridge:  continue to track, meals, meal planning, cooking, exercise using HipWay savage    Assessment:   Patient receptive to use of ugichemd as a single location for his trackers and journal entries which support his goals of independent living. He is doing well with meetings with .  She verbalized noticeable improvement in patient performance.  She however required training on techniques for increasing patient accountability. Continued OT tx indicated.     Plan for next session:  follow up on use of trackers/journal and goal tracking

## 2025-05-22 ENCOUNTER — OFFICE VISIT (OUTPATIENT)
Facility: CLINIC | Age: 35
End: 2025-05-22
Payer: MEDICARE

## 2025-05-22 DIAGNOSIS — R41.844 FRONTAL LOBE AND EXECUTIVE FUNCTION DEFICIT: ICD-10-CM

## 2025-05-22 DIAGNOSIS — R41.89 COGNITIVE IMPAIRMENT: Primary | ICD-10-CM

## 2025-05-22 DIAGNOSIS — Z87.820 HISTORY OF TRAUMATIC BRAIN INJURY: ICD-10-CM

## 2025-05-22 PROCEDURE — 96159 HLTH BHV IVNTJ INDIV EA ADDL: CPT | Performed by: PSYCHOLOGIST

## 2025-05-22 PROCEDURE — 96158 HLTH BHV IVNTJ INDIV 1ST 30: CPT | Performed by: PSYCHOLOGIST

## 2025-05-22 NOTE — PROGRESS NOTES
Neuropsychology Note    Name: Paige Ybarra    Date of Service: 05/22/2025     Reason For Visit: Neuropsychological Rehabilitation    Summary of Visit:  Paige Ybarra is being seen for neuropsychological rehabilitation of cognitive and emotional changes resulting from severe traumatic brain injury. Today we did a brief review of Paige's schedule and activities this past month, as usual. At present, his personal aide Stephanie is meeting with him once or twice a week to do a detailed review and to problem-solve with him when he does not accomplish his goals. He continues to use Neumind, but not as fully as possible, and he still puts some items in other places, creating some confusion. He agreed to try to keep everything in Neumind. Paige announced that he has decided to start another volunteer position at the [employer], starting tomorrow. He had not used a proper decision-making tool, such as the Goal Management Framework, to make his decision, though he said he thought it through in his head. His mother joined by phone for a few minutes, and she added that he did think it through, but should have used the GMF to avoid emotional decision-making. In any case, it seems to all that it is a good decision for him. We agreed that next month, we will all meet together to review Paige's progress since hiring his  and to review what his mother's role will be going forward. Paige and I discussed the idea that we are moving away from a health professional-based model to a community-based and more sustainable model by shifting my role to his PA.    Time: 4:00 pm - 4:45 pm    Next Session:  6/19/2025

## 2025-05-29 ENCOUNTER — APPOINTMENT (OUTPATIENT)
Dept: BEHAVIORAL HEALTH | Facility: CLINIC | Age: 35
End: 2025-05-29
Payer: MEDICARE

## 2025-05-29 DIAGNOSIS — F90.0 ATTENTION DEFICIT HYPERACTIVITY DISORDER (ADHD), PREDOMINANTLY INATTENTIVE TYPE: ICD-10-CM

## 2025-05-29 DIAGNOSIS — R41.89 COGNITIVE IMPAIRMENT: ICD-10-CM

## 2025-05-29 DIAGNOSIS — F31.9 BIPOLAR 1 DISORDER (MULTI): ICD-10-CM

## 2025-05-29 PROCEDURE — 90834 PSYTX W PT 45 MINUTES: CPT | Performed by: PSYCHOLOGIST

## 2025-05-29 NOTE — PROGRESS NOTES
Dictated on v2 Ratings software  8AM 75850 Indiv Psych for ADHD, bipolar I, executive/cognitive dysfunction (45 MIN, [phone]). Virtual. Supportive Therapy. Mood stable.  [employer] just finished with classes so he will have the summer off and is looking forward to sleeping in.  Patient also started volunteering at the [employer] and will be doing this on Thursdays.  Getting tired of activities at [employer].  Was saddened to hear that his friend, father Maral, will be moving from [place] to [place].  The  has been a support system for him.  Continues to be frustrated with online dating.  We discussed the importance of trying to get engaged in other activities to develop some friendships. Next: 1 week.

## 2025-06-03 ENCOUNTER — TREATMENT (OUTPATIENT)
Dept: OCCUPATIONAL THERAPY | Facility: HOSPITAL | Age: 35
End: 2025-06-03
Payer: MEDICARE

## 2025-06-03 DIAGNOSIS — R41.3 MEMORY LOSS: ICD-10-CM

## 2025-06-03 DIAGNOSIS — R41.842 VISUOSPATIAL DEFICIT: ICD-10-CM

## 2025-06-03 DIAGNOSIS — R41.89 COGNITIVE IMPAIRMENT: Primary | ICD-10-CM

## 2025-06-03 DIAGNOSIS — R27.8 COORDINATION IMPAIRMENT: ICD-10-CM

## 2025-06-03 PROCEDURE — 97535 SELF CARE MNGMENT TRAINING: CPT | Mod: GO | Performed by: OCCUPATIONAL THERAPIST

## 2025-06-03 ASSESSMENT — ACTIVITIES OF DAILY LIVING (ADL): HOME_MANAGEMENT_TIME_ENTRY: 62

## 2025-06-03 NOTE — PROGRESS NOTES
"Occupational Therapy    Patient Name:Paige Ybarra  MRN:[id number]  Today's Date:6/3/2025  Referred by: Herminia Angulo  Time Calculation  Start Time: 1604  Stop Time: 1706  Time Calculation (min): 62 min    Therapy Diagnosis  Problem List Items Addressed This Visit           ICD-10-CM    Cognitive impairment - Primary R41.89    Memory loss R41.3    Visuospatial deficit R41.842    Coordination impairment R27.8       Insurance  Visit number: 26(13 of this year)  Approved number of visits: MN  Auth required: No  Authorization date range: N/A  Onset Date: 2024  Medicare Certification Period:  Beginnin/12/25          Endin25  Payor: MEDICARE / Plan: MEDICARE PART A AND B / Product Type: *No Product type* /     Precautions/Fall Risk:  Fall Risk: Low based on STEADI  Pacemaker: no    Seizures: Yes -well controlled on medication      Subjective/Pain:  \"I had a phone call with the volunteer director from the [facility]\" stated by patient.  Patient expressed interest in volunteering there.   Current Pain Level (0-10): 0    HEP Compliance: Good    Objective/Outcome Measures:     Treatment  ADL/IADL:  62 minutes  Patient independently completed a goal management framework form for volunteering at [employer] after direction from Dr Park to do so.       Is using his food log in CargoGuard.  Patient is eating 2 or more meals a day 5x a week.    Patient has been finding recipes.   Exercise- completed 1x since last session, requested new journal for other exercises, added new journal entry  Cooking- has not put cooking in tracker- made tuna noodle casserole on 25, and made sloppy joes on 25  Has planned to cook pulled pork in crock pot.  He used his idea list to help plan meals.   Patient will start to add to his idea list when he finds a recipe.      Goals for next week:  continue to plan meals and shopping    Added cooking journal.      Cleaning- patient has placed a " cleaning routine in his Neumind.  He however is missing some rooms.  Patient asked to go through Neumind to determine what is missing.  He added clean living room on Fridays.    Monday- sweeping/cleaning floors  Tuesday- water plants  Wednesday- cook dinner, clean bathroom  Thursday- door knobs and office  Friday- clean living room  Saturday- clean kitchen and dining room  Sunday- dusting in bedroom  Patient confident that he knows how to complete each task.  He was accurate in describing cleaning tasks.       Home program/[employer]: keep tracking food, use journal for cooking and exercise,  make sure you plan you meal/shop, clean according to Neumind    Assessment:   Patient is using journal entries more consistently than tracker.  Changed tasks that were listed in tracker to a journal entry to increase likelihood of success.  He is demonstrating improved meal intake, improved participation in cleaning.  Patient participated in analyzing his cleaning schedule and adding missing components to his Neumind as an external memory aid.  Overall good progress toward OT goals.  Continued OT indicated.     Plan for next session:  continue to focus on use of memory tools, initiation and follow through with home management and meal planning/prep

## 2025-06-05 ENCOUNTER — APPOINTMENT (OUTPATIENT)
Dept: BEHAVIORAL HEALTH | Facility: CLINIC | Age: 35
End: 2025-06-05
Payer: MEDICARE

## 2025-06-05 DIAGNOSIS — F31.9 BIPOLAR 1 DISORDER (MULTI): ICD-10-CM

## 2025-06-05 DIAGNOSIS — F90.0 ATTENTION DEFICIT HYPERACTIVITY DISORDER (ADHD), PREDOMINANTLY INATTENTIVE TYPE: ICD-10-CM

## 2025-06-05 DIAGNOSIS — R41.89 COGNITIVE IMPAIRMENT: ICD-10-CM

## 2025-06-05 PROCEDURE — 90834 PSYTX W PT 45 MINUTES: CPT | Performed by: PSYCHOLOGIST

## 2025-06-05 NOTE — PROGRESS NOTES
Dictated on netTALK software  8AM 51936 Indiv Psych for ADHD, bipolar I, executive/cognitive dysfunction (45 MIN, [phone]).   Virtual. Supportive Therapy. Mood stable.  Patient met with  for pulm [place] recently.  He is trying to volunteer at the [employer].  Some anxiety about not hearing back from her but will wait and reach out next week.  Still using assistant a few hours a week to follow-up on goals.  Trying to cook 1 new meal per week.  Hopes to increase his exercise and possibly training for a 5K.  Still volunteering at [employer]. Next: 3 weeks.

## 2025-06-12 ENCOUNTER — APPOINTMENT (OUTPATIENT)
Dept: BEHAVIORAL HEALTH | Facility: CLINIC | Age: 35
End: 2025-06-12
Payer: MEDICARE

## 2025-06-19 ENCOUNTER — OFFICE VISIT (OUTPATIENT)
Facility: CLINIC | Age: 35
End: 2025-06-19
Payer: MEDICARE

## 2025-06-19 DIAGNOSIS — R41.844 FRONTAL LOBE AND EXECUTIVE FUNCTION DEFICIT: ICD-10-CM

## 2025-06-19 DIAGNOSIS — R41.89 COGNITIVE IMPAIRMENT: Primary | ICD-10-CM

## 2025-06-19 DIAGNOSIS — Z87.820 HISTORY OF TRAUMATIC BRAIN INJURY: ICD-10-CM

## 2025-06-19 PROCEDURE — 96159 HLTH BHV IVNTJ INDIV EA ADDL: CPT | Performed by: PSYCHOLOGIST

## 2025-06-19 PROCEDURE — 96158 HLTH BHV IVNTJ INDIV 1ST 30: CPT | Performed by: PSYCHOLOGIST

## 2025-06-19 NOTE — PROGRESS NOTES
Neuropsychology Note    Name: Paige Ybarra    Date of Service: 06/19/2025     Reason For Visit: Neuropsychological Rehabilitation    Summary of Visit:  Paige Ybarra is being seen for neuropsychological rehabilitation of cognitive and emotional changes resulting from severe traumatic brain injury. 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 He reviewed his work with his , Stephanie, saying he thinks it is going well and that she holds him responsible for complying with his plans and schedule. They meet for an hour each on Mondays and Fridays. We also had a short phone visit with her, reviewing and confirming their activities. We also spoke with his mother to review her view of how things are going now that we have shifted the role of supporting Paige to follow through with his plans from me to Stephanie. His mom reported that he is keeping up with his activities most of the time and also noted that he is showing more cognitive flexibility, which is an important component of independence.  Paige showed me some of his activities and trackers on the [email or web address] savage - he is getting more adept with it and is liking it more. Today Paige rated his overall mood as 7-8/10 and had no concerns or complaints. Together with his mother, we agreed to reduce the frequency of our meetings from every four weeks to every six weeks, though the next visit will still be in four weeks.    Time: 3:31 pm - 4:32 pm    Next Session:  7/17/2025

## 2025-06-20 ENCOUNTER — TREATMENT (OUTPATIENT)
Dept: OCCUPATIONAL THERAPY | Facility: HOSPITAL | Age: 35
End: 2025-06-20
Payer: MEDICARE

## 2025-06-20 DIAGNOSIS — R41.89 COGNITIVE IMPAIRMENT: ICD-10-CM

## 2025-06-20 DIAGNOSIS — R41.3 MEMORY LOSS: ICD-10-CM

## 2025-06-20 PROCEDURE — 97535 SELF CARE MNGMENT TRAINING: CPT | Mod: GO | Performed by: OCCUPATIONAL THERAPIST

## 2025-06-20 ASSESSMENT — ACTIVITIES OF DAILY LIVING (ADL): HOME_MANAGEMENT_TIME_ENTRY: 55

## 2025-06-20 NOTE — PROGRESS NOTES
"Occupational Therapy    Patient Name:Paige Ybarra  MRN:[id number]  Today's Date:2025  Referred by: Herminia Angulo  Time Calculation  Start Time: 1105  Stop Time: 1200  Time Calculation (min): 55 min    Therapy Diagnosis  Problem List Items Addressed This Visit           ICD-10-CM    Cognitive impairment R41.89    Memory loss R41.3       Insurance  Visit number: 27(14 of this year)  Approved number of visits: MN  Auth required: No  Authorization date range: N/A  Onset Date: 2024  Medicare Certification Period:  Beginnin/12/25           Endin25  Payor: MEDICARE / Plan: MEDICARE PART A AND B / Product Type: *No Product type* /     Precautions/Fall Risk:  Fall Risk: Low based on STEADI  Pacemaker: no    Seizures: Yes- well controlled on medication      Subjective/Pain:  Patient reports, \"I met with Dr. Angulo, she asked that I discuss with you using Google  or [employer]\".     Is tracking food intake, using tracking cooking and exercise, but did not clean using Neumind.  Julieta stating, My home is neat but not tidy. Patient however is satisfied with the state of his apartment.   Current Pain Level (0-10): 0    HEP Compliance: Good    Objective/Outcome Measures:     Treatment  ADL/IADL:  55 minutes  Recommend patient use combination of Neumind and Google calendar. He enjoys the weekly or monthly view that Neumind does not offer so that he can see his appointment.  Complete journals and to do list in Neumind and use Google calendar for appointments.     Patient is eating at least 2 meals a day now.   Cooked 2 times last week and one time this week.  Completed meal planning 2 times last week.   Was able to substitute ingredients easily 1x.     Healthy eating:  Patient is not using My plate.    Provided re-instruction and hand out on Eat the Ke[place].    Patient asked to identify a plan to increase his Keo food.  He identified plan to buy a food from each color during his weekly shopping " trip.   Will track colors of food eaten in his food in food journal.    Meetings with - meetings are going well, reports need to start writing down the to do items from meeting so that he can be more accountable. Patient does not take notes during his meeting. Patient will start to make hard copy of meeting form so that he will have physical copy of notes. Recommend patient begin to take ownership of running meetings.       Home program/Medbridge:  work on increasing Eat the  [place] foods.  Continue to track food in Neumind.     Assessment:   Patient is making good progress toward increasing independence and follow through with meal planning, cooking, increased frequency of meals. He is meeting consistently with his .  Plan to increase length between visits, follow up on tracking, and address other goal areas in upcoming sessions. Plan is to move toward patient independently managing his day/food/cleaning, etc with tracking and weekly meetings with .    Plan for next session:  follow up on tracking,  focus on improving food quality, ability to substitute foods in recipes

## 2025-06-21 LAB
BASOPHILS # BLD AUTO: 38 CELLS/UL (ref 0–200)
BASOPHILS NFR BLD AUTO: 0.4 %
EOSINOPHIL # BLD AUTO: 181 CELLS/UL (ref 15–500)
EOSINOPHIL NFR BLD AUTO: 1.9 %
ERYTHROCYTE [DISTWIDTH] IN BLOOD BY AUTOMATED COUNT: 14.2 % (ref 11–15)
HCT VFR BLD AUTO: 41.9 % (ref 38.5–50)
HGB BLD-MCNC: 13.2 G/DL (ref 13.2–17.1)
LYMPHOCYTES # BLD AUTO: 1796 CELLS/UL (ref 850–3900)
LYMPHOCYTES NFR BLD AUTO: 18.9 %
MCH RBC QN AUTO: 27.9 PG (ref 27–33)
MCHC RBC AUTO-ENTMCNC: 31.5 G/DL (ref 32–36)
MCV RBC AUTO: 88.6 FL (ref 80–100)
MONOCYTES # BLD AUTO: 409 CELLS/UL (ref 200–950)
MONOCYTES NFR BLD AUTO: 4.3 %
NEUTROPHILS # BLD AUTO: 7078 CELLS/UL (ref 1500–7800)
NEUTROPHILS NFR BLD AUTO: 74.5 %
PLATELET # BLD AUTO: 231 THOUSAND/UL (ref 140–400)
PMV BLD REES-ECKER: 11.9 FL (ref 7.5–12.5)
RBC # BLD AUTO: 4.73 MILLION/UL (ref 4.2–5.8)
WBC # BLD AUTO: 9.5 THOUSAND/UL (ref 3.8–10.8)

## 2025-06-26 ENCOUNTER — APPOINTMENT (OUTPATIENT)
Dept: BEHAVIORAL HEALTH | Facility: CLINIC | Age: 35
End: 2025-06-26
Payer: MEDICARE

## 2025-07-03 ENCOUNTER — APPOINTMENT (OUTPATIENT)
Dept: BEHAVIORAL HEALTH | Facility: CLINIC | Age: 35
End: 2025-07-03
Payer: MEDICARE

## 2025-07-03 VITALS
SYSTOLIC BLOOD PRESSURE: 121 MMHG | OXYGEN SATURATION: 98 % | BODY MASS INDEX: 26.39 KG/M2 | HEART RATE: 94 BPM | TEMPERATURE: 98.3 F | WEIGHT: 178.2 LBS | HEIGHT: 69 IN | DIASTOLIC BLOOD PRESSURE: 82 MMHG

## 2025-07-03 DIAGNOSIS — F31.9 BIPOLAR 1 DISORDER (MULTI): ICD-10-CM

## 2025-07-03 DIAGNOSIS — F41.9 ANXIETY: ICD-10-CM

## 2025-07-03 DIAGNOSIS — F09 COGNITIVE DYSFUNCTION, ACQUIRED: ICD-10-CM

## 2025-07-03 PROCEDURE — 1036F TOBACCO NON-USER: CPT | Performed by: PSYCHIATRY & NEUROLOGY

## 2025-07-03 PROCEDURE — 3074F SYST BP LT 130 MM HG: CPT | Performed by: PSYCHIATRY & NEUROLOGY

## 2025-07-03 PROCEDURE — 3008F BODY MASS INDEX DOCD: CPT | Performed by: PSYCHIATRY & NEUROLOGY

## 2025-07-03 PROCEDURE — 99213 OFFICE O/P EST LOW 20 MIN: CPT | Performed by: PSYCHIATRY & NEUROLOGY

## 2025-07-03 PROCEDURE — 3079F DIAST BP 80-89 MM HG: CPT | Performed by: PSYCHIATRY & NEUROLOGY
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
## 2025-07-03 NOTE — PATIENT INSTRUCTIONS
Plan:   - Please get blood tests done. Make sure you go to the lab after fasting overnight and before taking morning dose of lithium.   - You can decrease frequency of blood tests for clozapine to every 2 months if you wish.   - Continue current medications:   Buspirone 5mg in the morning, 10mg in the evening  Clozapine 225mg (200mg +25mg) in AM, 425mg (two 200mg + 25mg) at night  Lithium 450mg in the morning; 600mg in the evening.   Gabapentin 400mg tid.   Folic acid 1mg daily.  - May take melatonin 5 to 10mg only as needed for sleep.   - Continue cognitive rehab and psychotherapy.  - Continue occupational therapy.   - Continue healthy lifestyle.   - Follow up in 3 months.   - Contact via [employer] or call sooner ([phone]) in case of any questions or concerns.  - Call [phone] for mental health crisis or suicidal thoughts, or call 911 or go to the nearest emergency room for emergencies.

## 2025-07-08 ENCOUNTER — TREATMENT (OUTPATIENT)
Dept: OCCUPATIONAL THERAPY | Facility: HOSPITAL | Age: 35
End: 2025-07-08
Payer: MEDICARE

## 2025-07-08 ENCOUNTER — APPOINTMENT (OUTPATIENT)
Dept: BEHAVIORAL HEALTH | Facility: CLINIC | Age: 35
End: 2025-07-08
Payer: MEDICARE

## 2025-07-08 DIAGNOSIS — R41.3 MEMORY LOSS: ICD-10-CM

## 2025-07-08 DIAGNOSIS — R41.89 COGNITIVE IMPAIRMENT: Primary | ICD-10-CM

## 2025-07-08 PROCEDURE — 97535 SELF CARE MNGMENT TRAINING: CPT | Mod: GO | Performed by: OCCUPATIONAL THERAPIST

## 2025-07-08 ASSESSMENT — ACTIVITIES OF DAILY LIVING (ADL): HOME_MANAGEMENT_TIME_ENTRY: 65

## 2025-07-08 NOTE — PROGRESS NOTES
Occupational Therapy    Patient Name:Paige Ybarra  MRN:[id number]  Today's Date:2025  Referred by: Herminia Angulo  Time Calculation  Start Time: 1453  Stop Time: 1558  Time Calculation (min): 65 min    Therapy Diagnosis  Problem List Items Addressed This Visit           ICD-10-CM    Cognitive impairment - Primary R41.89    Memory loss R41.3       Insurance  Visit number: 28 (15 of this year)  Approved number of visits: MN  Auth required: No  Authorization date range: N/A  Onset Date: 2024  Medicare Certification Period:  Beginnin/12/25         Endin25  Payor: MEDICARE / Plan: MEDICARE PART A AND B / Product Type: *No Product type* /     Precautions/Fall Risk:  Fall Risk: Low based on STEADI  Pacemaker: no    Seizures: Yes -well controlled on medication      Subjective/Pain:  Current Pain Level (0-10): 0    HEP Compliance: Good    Objective/Outcome Measures:     Treatment  ADL/IADL:   minutes  Patient participated in Eat the [place] activity, reading a food log for the week and tracking how many foods of each color were eaten. He then determined if each day met the suggested intake of rainbow foods.  Patient with one error on , 2 errors on Wednesday, and did not finish Saturday beyond breakfast.     Patient participated in creation of food idea list he enjoys using cheat sheet of [place] foods.   He identified plan to increase rainbow foods, by selecting smoothie recipes for his new magic bullet.     Meeting notes- patient indicates that he has not really used his meeting notes. He feels meeting with his  is helpful and motivating.        Home program/Medbridge: continue to track [place] foods.  Review meeting notes to ensure you are meeting deficits identified.    Assessment:   Patient demonstrated increased follow through with regularly planned activities including meal prep, shopping,  exercising when reviewing Neumind tracking.  He was receptive to additional  instruction on use of Eat the [place] and demonstrated fair ability to track foods from a written menu.  Patient would benefit from additional instruction on healthy eating, and from continued assessment of success with . Continued OT indicated.     Plan for next session:  follow up on goals for meal prep, shopping, cleaning and eat the rainbow, continue instruction in these areas

## 2025-07-10 ENCOUNTER — APPOINTMENT (OUTPATIENT)
Dept: BEHAVIORAL HEALTH | Facility: CLINIC | Age: 35
End: 2025-07-10
Payer: MEDICARE

## 2025-07-10 DIAGNOSIS — F90.0 ATTENTION DEFICIT HYPERACTIVITY DISORDER (ADHD), PREDOMINANTLY INATTENTIVE TYPE: ICD-10-CM

## 2025-07-10 DIAGNOSIS — F31.9 BIPOLAR 1 DISORDER (MULTI): ICD-10-CM

## 2025-07-10 DIAGNOSIS — R41.89 COGNITIVE IMPAIRMENT: ICD-10-CM

## 2025-07-10 PROCEDURE — 90834 PSYTX W PT 45 MINUTES: CPT | Performed by: PSYCHOLOGIST

## 2025-07-10 NOTE — PROGRESS NOTES
Dictated on [employer] software  8AM 77905 Indiv Psych for ADHD, bipolar I, executive/cognitive dysfunction (45 MIN, [phone]). Virtual. Supportive Therapy. Mood stable.  Patient discussed Fourth of July weekend, his sisters were in town and he spent time with them.  He indicated that he has been seeing a woman he met on [employer], Alysha, and has been out a few days.  He is supposed to go out again tomorrow evening.  Has been volunteering at the [employer] on Tuesdays over the last month and is liking this experience.  Is liking meeting other people who also volunteer there.  Continues to go to [employer].  Exercising some, mostly on treadmill. Next: 2 weeks.

## 2025-07-17 ENCOUNTER — OFFICE VISIT (OUTPATIENT)
Facility: CLINIC | Age: 35
End: 2025-07-17
Payer: MEDICARE

## 2025-07-17 DIAGNOSIS — R41.89 COGNITIVE IMPAIRMENT: Primary | ICD-10-CM

## 2025-07-17 DIAGNOSIS — Z87.820 HISTORY OF TRAUMATIC BRAIN INJURY: ICD-10-CM

## 2025-07-17 DIAGNOSIS — R41.844 FRONTAL LOBE AND EXECUTIVE FUNCTION DEFICIT: ICD-10-CM

## 2025-07-17 PROCEDURE — 96159 HLTH BHV IVNTJ INDIV EA ADDL: CPT | Performed by: PSYCHOLOGIST

## 2025-07-17 PROCEDURE — 96158 HLTH BHV IVNTJ INDIV 1ST 30: CPT | Performed by: PSYCHOLOGIST

## 2025-07-17 NOTE — PROGRESS NOTES
Neuropsychology Note    Name: Piage Ybarra    Date of Service: 7/17/2025    An interactive audio and video telecommunication system which permits real time communications between the patient (at the originating site) and provider (at the distant site) was utilized to provide this telehealth service.   Verbal consent was requested and obtained from Paige Ybarra on this date, 7/17/2025 for a telehealth visit.    Reason For Visit: Neuropsychological Rehabilitation    Summary of Visit:  Paige Ybarra is being seen for neuropsychological rehabilitation of cognitive and emotional changes resulting from severe traumatic brain injury. Paige continues to progress in independent use of his strategies and tools, and is completing many independent living activities with support from his , who meets with him twice a week to review his activities and sends her checklists to his therapy team and to his mother. He has successfully added a new volunteer opportunity, working at the [employer], on Tuesdays. We tweaked his memory and planning system so that he will use Tarisa (and not Flavourly) for all events and appointments - everything that has a specific time to be done. He will use ApplyKit for all tasks and things to do. I suggested that Paige start taking more photos, as photos are very helpful to him in recalling activities. We are now reducing the frequency of sessions to every six weeks.    Time: 3:20 pm - 4:23 pm    Next Session:  8/28/2025

## 2025-07-24 ENCOUNTER — APPOINTMENT (OUTPATIENT)
Dept: BEHAVIORAL HEALTH | Facility: CLINIC | Age: 35
End: 2025-07-24
Payer: MEDICARE

## 2025-07-24 DIAGNOSIS — F31.9 BIPOLAR 1 DISORDER (MULTI): ICD-10-CM

## 2025-07-24 DIAGNOSIS — F90.0 ATTENTION DEFICIT HYPERACTIVITY DISORDER (ADHD), PREDOMINANTLY INATTENTIVE TYPE: ICD-10-CM

## 2025-07-24 DIAGNOSIS — R41.89 COGNITIVE IMPAIRMENT: ICD-10-CM

## 2025-07-24 PROCEDURE — 90834 PSYTX W PT 45 MINUTES: CPT | Performed by: PSYCHOLOGIST

## 2025-07-24 NOTE — PROGRESS NOTES
Dictated on [employer] software  8AM 49951 Indiv Psych for ADHD, bipolar I, executive/cognitive dysfunction (45 MIN, [phone]). Virtual. Supportive Therapy. Mood stable.  Patient reported that he is still dating Alysha.  Spent most the time discussing that at this point.  Patient discussed wanting to tell her that he had bipolar disorder and we weighed the pros and cons of doing that.  Discussed how it may become relevant at some point why he is not working and memory problems.  Discussed how TBI may be more relevant but that is ultimately up to him to decide.  Recently went on a date to [employer] ice cream.  Is enjoying the companionship.  His mother suggested that he write down things that he wants to remember.  I reinforced him taking notes following dates and other situations so that he can keep track of the items that are discussed.  He did this today following our meeting.

## 2025-07-26 LAB
25(OH)D3+25(OH)D2 SERPL-MCNC: 40 NG/ML (ref 30–100)
ANION GAP SERPL CALCULATED.4IONS-SCNC: 8 MMOL/L (CALC) (ref 7–17)
BASOPHILS # BLD AUTO: 43 CELLS/UL (ref 0–200)
BASOPHILS NFR BLD AUTO: 0.5 %
BUN SERPL-MCNC: 10 MG/DL (ref 7–25)
BUN/CREAT SERPL: NORMAL (CALC) (ref 6–22)
CALCIUM SERPL-MCNC: 9.8 MG/DL (ref 8.6–10.3)
CHLORIDE SERPL-SCNC: 109 MMOL/L (ref 98–110)
CHOLEST SERPL-MCNC: 176 MG/DL
CHOLEST/HDLC SERPL: 2.8 (CALC)
CO2 SERPL-SCNC: 24 MMOL/L (ref 20–32)
CREAT SERPL-MCNC: 1.01 MG/DL (ref 0.6–1.26)
EGFRCR SERPLBLD CKD-EPI 2021: 101 ML/MIN/1.73M2
EOSINOPHIL # BLD AUTO: 189 CELLS/UL (ref 15–500)
EOSINOPHIL NFR BLD AUTO: 2.2 %
ERYTHROCYTE [DISTWIDTH] IN BLOOD BY AUTOMATED COUNT: 14.2 % (ref 11–15)
GLUCOSE SERPL-MCNC: 86 MG/DL (ref 65–99)
HCT VFR BLD AUTO: 43.5 % (ref 38.5–50)
HDLC SERPL-MCNC: 62 MG/DL
HGB BLD-MCNC: 13.7 G/DL (ref 13.2–17.1)
LDLC SERPL CALC-MCNC: 99 MG/DL (CALC)
LITHIUM SERPL-SCNC: 1.1 MMOL/L (ref 0.6–1.2)
LYMPHOCYTES # BLD AUTO: 2133 CELLS/UL (ref 850–3900)
LYMPHOCYTES NFR BLD AUTO: 24.8 %
MCH RBC QN AUTO: 28.4 PG (ref 27–33)
MCHC RBC AUTO-ENTMCNC: 31.5 G/DL (ref 32–36)
MCV RBC AUTO: 90.2 FL (ref 80–100)
MONOCYTES # BLD AUTO: 378 CELLS/UL (ref 200–950)
MONOCYTES NFR BLD AUTO: 4.4 %
NEUTROPHILS # BLD AUTO: 5857 CELLS/UL (ref 1500–7800)
NEUTROPHILS NFR BLD AUTO: 68.1 %
NONHDLC SERPL-MCNC: 114 MG/DL (CALC)
PLATELET # BLD AUTO: 224 THOUSAND/UL (ref 140–400)
PMV BLD REES-ECKER: 11.9 FL (ref 7.5–12.5)
POTASSIUM SERPL-SCNC: 4.6 MMOL/L (ref 3.5–5.3)
RBC # BLD AUTO: 4.82 MILLION/UL (ref 4.2–5.8)
SODIUM SERPL-SCNC: 141 MMOL/L (ref 135–146)
TRIGL SERPL-MCNC: 68 MG/DL
TSH SERPL-ACNC: 1.21 MIU/L (ref 0.4–4.5)
WBC # BLD AUTO: 8.6 THOUSAND/UL (ref 3.8–10.8)

## 2025-08-05 ENCOUNTER — TREATMENT (OUTPATIENT)
Dept: OCCUPATIONAL THERAPY | Facility: HOSPITAL | Age: 35
End: 2025-08-05
Payer: MEDICARE

## 2025-08-05 DIAGNOSIS — R41.89 COGNITIVE IMPAIRMENT: Primary | ICD-10-CM

## 2025-08-05 DIAGNOSIS — R41.3 MEMORY LOSS: ICD-10-CM

## 2025-08-05 PROCEDURE — 97535 SELF CARE MNGMENT TRAINING: CPT | Mod: GO | Performed by: OCCUPATIONAL THERAPIST

## 2025-08-05 ASSESSMENT — ACTIVITIES OF DAILY LIVING (ADL): HOME_MANAGEMENT_TIME_ENTRY: 50

## 2025-08-05 NOTE — PROGRESS NOTES
Occupational Therapy    Patient Name:Paige Ybarra  MRN:[id number]  Today's Date:2025  Referred by: Herminia Angulo  Time Calculation  Start Time: 1516  Stop Time: 1606  Time Calculation (min): 50 min    Therapy Diagnosis  Problem List Items Addressed This Visit           ICD-10-CM    Cognitive impairment - Primary R41.89    Memory loss R41.3       Insurance  Visit number: 29(16 of this year)  Approved number of visits: MN  Auth required: No  Authorization date range: N/A  Onset Date: 2024  Medicare Certification Period:  Beginnin/7/2025            Endin/3/25  Payor: MEDICARE / Plan: MEDICARE PART A AND B / Product Type: *No Product type* /     Precautions/Fall Risk:  Fall Risk: Low based on STEADI  Pacemaker: no    Seizures: Yes - well controlled on medication      Subjective/Pain:  Patient reports that he has been double booking himself, because he has not put everything in his Google calendar. He indicates that he has missed a few things as a result.     Current Pain Level (0-10): 0    HEP Compliance: Fair    Objective/Outcome Measures:    Everyday memory questionnaire: 33 (improvement)    Treatment  ADL/IADL:   minutes  Meal prep/cooking-  Patient questioning how long something can last in the fridge-  he was able to problem solve  Patient is cooking 1-2x a week.  He however is only eating one meal a day +snack. Provided instruction on use of my plate plan 200 calorie plan.   Goal:  1/2 of what is recommended by My plate  Barriers- do not feel hungry  Don't feel like cooking  Plan  Use crock pot in the morning  Ask Stephanie to be held accountable  Complete meal plan and post on fridge    Grocery shopping- is shopping one time a week    Ingredient substitution- 9/10 correct    Coordination- declined further work, but feels tennis is meeting his goal    Visual spatial skills- is doing well with gardening at home, [employer] and at [facility]     - meeting 2x a week,  recently added meal planning and grocery shopping planning    Goal decrease tv time to an hour a day  - when using [employer] watch 2 30 minute shows or 1 hour show        Home program/[employer]bridge:  determine how many times a month is the acceptable minimum for cleaning, think about personal goals for next certification period and send OT an additional goals you have    Assessment:     Patient is demonstrating progress toward meal planning, grocery shopping, ability to determine substitutions during meal prep.  He is meeting consistently with his personal assistance, but does not consistently follow up on areas deemed insufficient or on goals established with .  Patient identified goal of decreasing his tv time to allow him to complete home management at an acceptable level.  Patient has not made progress toward goal of healthy eating, goal discontinued.  Goals reviewed and adjusted this date with patient. OT plan of care extended for 12 weeks at a frequency of 1-2x a month.      Patient will demonstrate independent meal planning, shopping, and meal preparation using compensatory tools/techniques.  progressing    Patient will perform grocery shopping activities independently and consistently using cognitive compensatory techniques. met    Patient will demonstrate ability to plan meals using My plate or Eat the [place] tools 75% of the time.  Not progressing    Patient will independently demonstrate safety awareness while preparing a stove top meal including management of chicken/meat.  met    Patient will independently problem solve appropriate food substitutes for recipes through use of compensatory techniques. progressing    Patient will perform independent home program to allow improved motor control during functional tasks as evidenced by 3 second improvement in 9HPT. 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 progressing    Goal added: Patient will complete meal preparation activities independently for unfamiliar recipe.     Goal added: Patient will independently monitor and manage time to allow participation in leisure while meeting responsibilities of living independently.        Plan for next session:

## 2025-08-07 ENCOUNTER — APPOINTMENT (OUTPATIENT)
Dept: BEHAVIORAL HEALTH | Facility: CLINIC | Age: 35
End: 2025-08-07
Payer: MEDICARE

## 2025-08-07 DIAGNOSIS — F31.9 BIPOLAR 1 DISORDER (MULTI): ICD-10-CM

## 2025-08-07 DIAGNOSIS — R41.89 COGNITIVE IMPAIRMENT: ICD-10-CM

## 2025-08-07 DIAGNOSIS — F90.0 ATTENTION DEFICIT HYPERACTIVITY DISORDER (ADHD), PREDOMINANTLY INATTENTIVE TYPE: ICD-10-CM

## 2025-08-07 PROCEDURE — 90834 PSYTX W PT 45 MINUTES: CPT | Performed by: PSYCHOLOGIST

## 2025-08-07 NOTE — PROGRESS NOTES
Dictated on [employer] software  8AM 51913 Indiv Psych for ADHD, bipolar I, executive/cognitive dysfunction (45 MIN, [phone]). Virtual. Supportive Therapy. Mood stable.  Patient's old friend, Father Maral, who he met at [employer] came into town.  They have spent some time together.  Patient feeling bad that he did not disclose that he has been dating a woman.  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  We both agreed that that will likely never happen.  He is going on a date again tonight with Alysha Perry.  Patient is challenging himself to make 1 new recipe per week and we discussed new recipes for short period of time.  He has not been exercising routinely.  Continues to go to [employer], [employer], and food pantry. Next: 2 weeks.

## 2025-08-19 DIAGNOSIS — F31.9 BIPOLAR 1 DISORDER (MULTI): ICD-10-CM

## 2025-08-20 ENCOUNTER — APPOINTMENT (OUTPATIENT)
Dept: BEHAVIORAL HEALTH | Facility: CLINIC | Age: 35
End: 2025-08-20
Payer: MEDICARE

## 2025-08-20 DIAGNOSIS — F90.0 ATTENTION DEFICIT HYPERACTIVITY DISORDER (ADHD), PREDOMINANTLY INATTENTIVE TYPE: ICD-10-CM

## 2025-08-20 DIAGNOSIS — F31.9 BIPOLAR 1 DISORDER (MULTI): ICD-10-CM

## 2025-08-20 DIAGNOSIS — R41.89 COGNITIVE IMPAIRMENT: ICD-10-CM

## 2025-08-20 PROCEDURE — 90837 PSYTX W PT 60 MINUTES: CPT | Performed by: PSYCHOLOGIST

## 2025-08-22 LAB
BASOPHILS # BLD AUTO: 37 CELLS/UL (ref 0–200)
BASOPHILS NFR BLD AUTO: 0.4 %
EOSINOPHIL # BLD AUTO: 156 CELLS/UL (ref 15–500)
EOSINOPHIL NFR BLD AUTO: 1.7 %
ERYTHROCYTE [DISTWIDTH] IN BLOOD BY AUTOMATED COUNT: 14 % (ref 11–15)
HCT VFR BLD AUTO: 40 % (ref 38.5–50)
HGB BLD-MCNC: 12.6 G/DL (ref 13.2–17.1)
LYMPHOCYTES # BLD AUTO: 2254 CELLS/UL (ref 850–3900)
LYMPHOCYTES NFR BLD AUTO: 24.5 %
MCH RBC QN AUTO: 28.6 PG (ref 27–33)
MCHC RBC AUTO-ENTMCNC: 31.5 G/DL (ref 32–36)
MCV RBC AUTO: 90.7 FL (ref 80–100)
MONOCYTES # BLD AUTO: 580 CELLS/UL (ref 200–950)
MONOCYTES NFR BLD AUTO: 6.3 %
NEUTROPHILS # BLD AUTO: 6173 CELLS/UL (ref 1500–7800)
NEUTROPHILS NFR BLD AUTO: 67.1 %
PLATELET # BLD AUTO: 230 THOUSAND/UL (ref 140–400)
PMV BLD REES-ECKER: 11.7 FL (ref 7.5–12.5)
RBC # BLD AUTO: 4.41 MILLION/UL (ref 4.2–5.8)
WBC # BLD AUTO: 9.2 THOUSAND/UL (ref 3.8–10.8)

## 2025-08-26 DIAGNOSIS — F41.9 ANXIETY: ICD-10-CM

## 2025-08-26 DIAGNOSIS — F31.70 BIPOLAR AFFECTIVE DISORDER IN REMISSION (MULTI): ICD-10-CM

## 2025-08-27 DIAGNOSIS — F41.9 ANXIETY: ICD-10-CM

## 2025-08-27 RX ORDER — LITHIUM CARBONATE 300 MG/1
TABLET, FILM COATED, EXTENDED RELEASE ORAL
Qty: 60 TABLET | Refills: 11 | Status: SHIPPED | OUTPATIENT
Start: 2025-08-27

## 2025-08-27 RX ORDER — GABAPENTIN 400 MG/1
400 CAPSULE ORAL 3 TIMES DAILY
Qty: 90 CAPSULE | Refills: 11 | Status: SHIPPED | OUTPATIENT
Start: 2025-08-27

## 2025-08-27 RX ORDER — BUSPIRONE HYDROCHLORIDE 10 MG/1
TABLET ORAL
Qty: 30 TABLET | Refills: 11 | Status: SHIPPED | OUTPATIENT
Start: 2025-08-27

## 2025-08-28 ENCOUNTER — OFFICE VISIT (OUTPATIENT)
Facility: CLINIC | Age: 35
End: 2025-08-28
Payer: MEDICARE

## 2025-08-28 DIAGNOSIS — S06.9X5S TRAUMATIC BRAIN INJURY WITH PROLONGED (MORE THAN 24 HOURS) LOSS OF CONSCIOUSNESS WITH RETURN TO PRE-EXISTING CONSCIOUS LEVEL, SEQUELA: ICD-10-CM

## 2025-08-28 DIAGNOSIS — Z87.820 HISTORY OF TRAUMATIC BRAIN INJURY: ICD-10-CM

## 2025-08-28 DIAGNOSIS — R41.844 FRONTAL LOBE AND EXECUTIVE FUNCTION DEFICIT: ICD-10-CM

## 2025-08-28 DIAGNOSIS — R41.89 COGNITIVE IMPAIRMENT: Primary | ICD-10-CM

## 2025-08-28 PROCEDURE — 96159 HLTH BHV IVNTJ INDIV EA ADDL: CPT | Performed by: PSYCHOLOGIST

## 2025-08-28 PROCEDURE — 96158 HLTH BHV IVNTJ INDIV 1ST 30: CPT | Performed by: PSYCHOLOGIST

## 2025-08-28 RX ORDER — BUSPIRONE HYDROCHLORIDE 5 MG/1
5 TABLET ORAL
Qty: 30 TABLET | Refills: 11 | Status: SHIPPED | OUTPATIENT
Start: 2025-08-28

## 2025-09-02 ENCOUNTER — TREATMENT (OUTPATIENT)
Dept: OCCUPATIONAL THERAPY | Facility: HOSPITAL | Age: 35
End: 2025-09-02
Payer: MEDICARE

## 2025-09-02 DIAGNOSIS — R41.3 MEMORY LOSS: ICD-10-CM

## 2025-09-02 DIAGNOSIS — R41.89 COGNITIVE IMPAIRMENT: ICD-10-CM

## 2025-09-02 PROCEDURE — 97535 SELF CARE MNGMENT TRAINING: CPT | Mod: GO | Performed by: OCCUPATIONAL THERAPIST

## 2025-09-02 ASSESSMENT — ACTIVITIES OF DAILY LIVING (ADL): HOME_MANAGEMENT_TIME_ENTRY: 85

## 2025-09-04 ENCOUNTER — APPOINTMENT (OUTPATIENT)
Dept: BEHAVIORAL HEALTH | Facility: CLINIC | Age: 35
End: 2025-09-04
Payer: MEDICARE

## 2025-09-18 ENCOUNTER — APPOINTMENT (OUTPATIENT)
Dept: BEHAVIORAL HEALTH | Facility: CLINIC | Age: 35
End: 2025-09-18
Payer: MEDICARE

## 2025-10-02 ENCOUNTER — APPOINTMENT (OUTPATIENT)
Dept: BEHAVIORAL HEALTH | Facility: CLINIC | Age: 35
End: 2025-10-02
Payer: MEDICARE

## 2025-10-16 ENCOUNTER — APPOINTMENT (OUTPATIENT)
Dept: BEHAVIORAL HEALTH | Facility: CLINIC | Age: 35
End: 2025-10-16
Payer: MEDICARE

## 2025-10-30 ENCOUNTER — APPOINTMENT (OUTPATIENT)
Dept: BEHAVIORAL HEALTH | Facility: CLINIC | Age: 35
End: 2025-10-30
Payer: MEDICARE

## 2025-11-13 ENCOUNTER — APPOINTMENT (OUTPATIENT)
Dept: BEHAVIORAL HEALTH | Facility: CLINIC | Age: 35
End: 2025-11-13
Payer: MEDICARE

## 2025-12-04 ENCOUNTER — APPOINTMENT (OUTPATIENT)
Dept: BEHAVIORAL HEALTH | Facility: CLINIC | Age: 35
End: 2025-12-04
Payer: MEDICARE

## 2026-04-24 ENCOUNTER — APPOINTMENT (OUTPATIENT)
Dept: PRIMARY CARE | Facility: CLINIC | Age: 36
End: 2026-04-24
Payer: MEDICARE

## (undated) DEVICE — PREP TRAY, VAGINAL

## (undated) DEVICE — GLOVE, SURGICAL, PROTEXIS PI W/NEU-THERA, 7.5, PF, LF

## (undated) DEVICE — SUTURE, ETHILON, 3-0, 18 IN, PS1, BLACK

## (undated) DEVICE — SUTURE, VICRYL, 0, 36 IN, CT-1, UNDYED

## (undated) DEVICE — TIP, SUCTION, FRAZIER, W/CONTROL VENT, 10 FR

## (undated) DEVICE — ADHESIVE, SKIN, MASTISOL, 2/3 CC VIAL

## (undated) DEVICE — ELECTRODE, ELECTROSURGICAL, BLADE, INSULATED, ENT/IMA, STERILE

## (undated) DEVICE — PAD, GROUNDING, ELECTROSURGICAL, W/9 FT CABLE, POLYHESIVE II, ADULT, LF

## (undated) DEVICE — GLOVE, SURGICAL, PROTEXIS PI BLUE W/NEUTHERA, 8.0, PF, LF

## (undated) DEVICE — SUTURE, VICRYL, 0, 27 IN, CT-1, UNDYED

## (undated) DEVICE — Device

## (undated) DEVICE — DRESSING, ABDOMINAL, TENDERSORB, 8 X 10 IN, STERILE